# Patient Record
Sex: FEMALE | Race: WHITE | Employment: OTHER | URBAN - NONMETROPOLITAN AREA
[De-identification: names, ages, dates, MRNs, and addresses within clinical notes are randomized per-mention and may not be internally consistent; named-entity substitution may affect disease eponyms.]

---

## 2017-04-18 RX ORDER — VALSARTAN 160 MG/1
1 TABLET ORAL DAILY
COMMUNITY
Start: 2011-09-14 | End: 2017-04-18 | Stop reason: SDUPTHER

## 2017-04-18 RX ORDER — VALSARTAN 160 MG/1
160 TABLET ORAL DAILY
Qty: 90 TABLET | Refills: 1 | Status: SHIPPED | OUTPATIENT
Start: 2017-04-18 | End: 2017-10-16 | Stop reason: SDUPTHER

## 2017-04-26 ENCOUNTER — HOSPITAL ENCOUNTER (OUTPATIENT)
Dept: WOMENS IMAGING | Age: 82
Discharge: HOME | End: 2017-04-26
Payer: MEDICARE

## 2017-04-26 DIAGNOSIS — Z12.31 VISIT FOR SCREENING MAMMOGRAM: ICD-10-CM

## 2017-04-26 PROCEDURE — 77063 BREAST TOMOSYNTHESIS BI: CPT

## 2017-05-01 ENCOUNTER — LAB (OUTPATIENT)
Dept: FAMILY MEDICINE CLINIC | Facility: CLINIC | Age: 82
End: 2017-05-01

## 2017-05-01 DIAGNOSIS — Z00.00 ROUTINE GENERAL MEDICAL EXAMINATION AT A HEALTH CARE FACILITY: Primary | ICD-10-CM

## 2017-05-01 DIAGNOSIS — E55.9 UNSPECIFIED VITAMIN D DEFICIENCY: ICD-10-CM

## 2017-05-02 LAB
25(OH)D3+25(OH)D2 SERPL-MCNC: 52.3 NG/ML (ref 30–100)
ALBUMIN SERPL-MCNC: 3.8 G/DL (ref 3.5–5)
ALBUMIN/GLOB SERPL: 1.2 G/DL (ref 1.1–2.5)
ALP SERPL-CCNC: 100 U/L (ref 24–120)
ALT SERPL-CCNC: 30 U/L (ref 0–54)
AST SERPL-CCNC: 32 U/L (ref 7–45)
BASOPHILS # BLD AUTO: 0.12 10*3/MM3 (ref 0–0.2)
BASOPHILS NFR BLD AUTO: 1.3 % (ref 0–2)
BILIRUB SERPL-MCNC: 0.4 MG/DL (ref 0.1–1)
BUN SERPL-MCNC: 15 MG/DL (ref 5–21)
BUN/CREAT SERPL: 19.5 (ref 7–25)
CALCIUM SERPL-MCNC: 9.7 MG/DL (ref 8.4–10.4)
CHLORIDE SERPL-SCNC: 100 MMOL/L (ref 98–110)
CHOLEST SERPL-MCNC: 157 MG/DL (ref 130–200)
CO2 SERPL-SCNC: 28 MMOL/L (ref 24–31)
CREAT SERPL-MCNC: 0.77 MG/DL (ref 0.5–1.4)
EOSINOPHIL # BLD AUTO: 0.51 10*3/MM3 (ref 0–0.7)
EOSINOPHIL NFR BLD AUTO: 5.6 % (ref 0–4)
ERYTHROCYTE [DISTWIDTH] IN BLOOD BY AUTOMATED COUNT: 14.8 % (ref 12–15)
GLOBULIN SER CALC-MCNC: 3.2 GM/DL
GLUCOSE SERPL-MCNC: 86 MG/DL (ref 70–100)
HCT VFR BLD AUTO: 42.8 % (ref 37–47)
HDLC SERPL-MCNC: 63 MG/DL
HGB BLD-MCNC: 14 G/DL (ref 12–16)
IMM GRANULOCYTES # BLD: 0.02 10*3/MM3 (ref 0–0.03)
IMM GRANULOCYTES NFR BLD: 0.2 % (ref 0–5)
LDLC SERPL CALC-MCNC: 73 MG/DL (ref 0–99)
LYMPHOCYTES # BLD AUTO: 2.27 10*3/MM3 (ref 0.72–4.86)
LYMPHOCYTES NFR BLD AUTO: 24.8 % (ref 15–45)
MCH RBC QN AUTO: 30 PG (ref 28–32)
MCHC RBC AUTO-ENTMCNC: 32.7 G/DL (ref 33–36)
MCV RBC AUTO: 91.8 FL (ref 82–98)
MONOCYTES # BLD AUTO: 0.63 10*3/MM3 (ref 0.19–1.3)
MONOCYTES NFR BLD AUTO: 6.9 % (ref 4–12)
NEUTROPHILS # BLD AUTO: 5.6 10*3/MM3 (ref 1.87–8.4)
NEUTROPHILS NFR BLD AUTO: 61.2 % (ref 39–78)
PLATELET # BLD AUTO: 365 10*3/MM3 (ref 130–400)
POTASSIUM SERPL-SCNC: 4.5 MMOL/L (ref 3.5–5.3)
PROT SERPL-MCNC: 7 G/DL (ref 6.3–8.7)
RBC # BLD AUTO: 4.66 10*6/MM3 (ref 4.2–5.4)
SODIUM SERPL-SCNC: 139 MMOL/L (ref 135–145)
TRIGL SERPL-MCNC: 105 MG/DL (ref 0–149)
TSH SERPL DL<=0.005 MIU/L-ACNC: 0.89 MIU/ML (ref 0.47–4.68)
VLDLC SERPL CALC-MCNC: 21 MG/DL
WBC # BLD AUTO: 9.15 10*3/MM3 (ref 4.8–10.8)

## 2017-05-03 ENCOUNTER — OFFICE VISIT (OUTPATIENT)
Dept: SURGERY | Age: 82
End: 2017-05-03
Payer: MEDICARE

## 2017-05-03 VITALS
BODY MASS INDEX: 23.39 KG/M2 | DIASTOLIC BLOOD PRESSURE: 66 MMHG | HEIGHT: 63 IN | SYSTOLIC BLOOD PRESSURE: 130 MMHG | HEART RATE: 68 BPM | RESPIRATION RATE: 16 BRPM | WEIGHT: 132 LBS

## 2017-05-03 DIAGNOSIS — Z12.31 VISIT FOR SCREENING MAMMOGRAM: Primary | ICD-10-CM

## 2017-05-03 DIAGNOSIS — Z98.890 S/P LEFT BREAST BIOPSY: ICD-10-CM

## 2017-05-03 PROCEDURE — 99213 OFFICE O/P EST LOW 20 MIN: CPT | Performed by: SURGERY

## 2017-05-08 PROBLEM — E89.40 SURGICAL MENOPAUSE: Status: ACTIVE | Noted: 2017-05-08

## 2017-05-08 PROBLEM — M81.0 OSTEOPOROSIS: Status: ACTIVE | Noted: 2017-05-08

## 2017-05-08 PROBLEM — I50.9 CONGESTIVE HEART FAILURE: Status: ACTIVE | Noted: 2017-05-08

## 2017-05-08 PROBLEM — I10 HYPERTENSION: Chronic | Status: ACTIVE | Noted: 2017-05-08

## 2017-05-08 PROBLEM — N60.19 FIBROCYSTIC BREAST DISEASE: Chronic | Status: ACTIVE | Noted: 2017-05-08

## 2017-05-08 PROBLEM — D64.9 ANEMIA: Status: ACTIVE | Noted: 2017-05-08

## 2017-05-08 PROBLEM — Z00.00 WELLNESS EXAMINATION: Status: ACTIVE | Noted: 2017-05-08

## 2017-05-08 PROBLEM — S06.0XAA CONCUSSION: Status: ACTIVE | Noted: 2017-05-08

## 2017-05-09 ENCOUNTER — OFFICE VISIT (OUTPATIENT)
Dept: FAMILY MEDICINE CLINIC | Facility: CLINIC | Age: 82
End: 2017-05-09

## 2017-05-09 VITALS
OXYGEN SATURATION: 97 % | SYSTOLIC BLOOD PRESSURE: 124 MMHG | TEMPERATURE: 98.5 F | DIASTOLIC BLOOD PRESSURE: 70 MMHG | HEIGHT: 63 IN | WEIGHT: 138 LBS | HEART RATE: 82 BPM | RESPIRATION RATE: 16 BRPM | BODY MASS INDEX: 24.45 KG/M2

## 2017-05-09 DIAGNOSIS — I50.32 CHRONIC DIASTOLIC CONGESTIVE HEART FAILURE (HCC): ICD-10-CM

## 2017-05-09 DIAGNOSIS — M81.0 OSTEOPOROSIS: ICD-10-CM

## 2017-05-09 DIAGNOSIS — D64.9 ANEMIA, UNSPECIFIED TYPE: Primary | ICD-10-CM

## 2017-05-09 DIAGNOSIS — N60.19 FIBROCYSTIC BREAST DISEASE, UNSPECIFIED LATERALITY: Chronic | ICD-10-CM

## 2017-05-09 DIAGNOSIS — R51.9 HEADACHE, UNSPECIFIED HEADACHE TYPE: ICD-10-CM

## 2017-05-09 DIAGNOSIS — I10 ESSENTIAL HYPERTENSION: Chronic | ICD-10-CM

## 2017-05-09 DIAGNOSIS — R22.1 NODULE OF NECK: ICD-10-CM

## 2017-05-09 PROCEDURE — 99213 OFFICE O/P EST LOW 20 MIN: CPT | Performed by: FAMILY MEDICINE

## 2017-05-09 RX ORDER — MULTIVIT,IRON,MINERALS/LUTEIN
1 TABLET ORAL DAILY
COMMUNITY

## 2017-05-09 RX ORDER — ASPIRIN 81 MG/1
1 TABLET ORAL DAILY
COMMUNITY

## 2017-05-09 RX ORDER — CHLORAL HYDRATE 500 MG
1000 CAPSULE ORAL
COMMUNITY

## 2017-07-31 ENCOUNTER — OFFICE VISIT (OUTPATIENT)
Dept: NEUROLOGY | Age: 82
End: 2017-07-31
Payer: MEDICARE

## 2017-07-31 VITALS
HEIGHT: 63 IN | DIASTOLIC BLOOD PRESSURE: 85 MMHG | BODY MASS INDEX: 23.74 KG/M2 | SYSTOLIC BLOOD PRESSURE: 135 MMHG | RESPIRATION RATE: 14 BRPM | HEART RATE: 82 BPM | WEIGHT: 134 LBS

## 2017-07-31 DIAGNOSIS — M54.81 BILATERAL OCCIPITAL NEURALGIA: Primary | ICD-10-CM

## 2017-07-31 DIAGNOSIS — M54.2 CERVICALGIA: ICD-10-CM

## 2017-07-31 PROCEDURE — 99203 OFFICE O/P NEW LOW 30 MIN: CPT | Performed by: PSYCHIATRY & NEUROLOGY

## 2017-07-31 RX ORDER — RALOXIFENE HYDROCHLORIDE 60 MG/1
TABLET, FILM COATED ORAL
COMMUNITY
Start: 2016-10-19 | End: 2017-07-31

## 2017-07-31 RX ORDER — VALSARTAN 160 MG/1
160 TABLET ORAL
COMMUNITY
Start: 2017-04-18 | End: 2017-07-31

## 2017-07-31 RX ORDER — ASPIRIN 81 MG/1
1 TABLET ORAL
COMMUNITY
End: 2017-07-31

## 2017-07-31 RX ORDER — CYCLOBENZAPRINE HCL 10 MG
5 TABLET ORAL 2 TIMES DAILY PRN
Qty: 60 TABLET | Refills: 1 | Status: SHIPPED | OUTPATIENT
Start: 2017-07-31 | End: 2017-08-10

## 2017-07-31 RX ORDER — CHLORAL HYDRATE 500 MG
1000 CAPSULE ORAL
COMMUNITY
End: 2017-07-31

## 2017-07-31 RX ORDER — CALCIUM CARBONATE/VITAMIN D3 600 MG-10
1 TABLET ORAL
COMMUNITY
End: 2017-07-31

## 2017-07-31 RX ORDER — MULTIVIT,IRON,MINERALS/LUTEIN
1 TABLET ORAL
COMMUNITY

## 2017-08-01 ENCOUNTER — TELEPHONE (OUTPATIENT)
Dept: NEUROLOGY | Age: 82
End: 2017-08-01

## 2017-08-01 RX ORDER — TIZANIDINE HYDROCHLORIDE 4 MG/1
4 CAPSULE, GELATIN COATED ORAL 3 TIMES DAILY PRN
Qty: 60 CAPSULE | Refills: 2 | Status: SHIPPED | OUTPATIENT
Start: 2017-08-01 | End: 2017-08-31

## 2017-08-01 RX ORDER — TIZANIDINE 4 MG/1
4 TABLET ORAL EVERY 8 HOURS PRN
Qty: 60 TABLET | Refills: 2 | Status: SHIPPED | OUTPATIENT
Start: 2017-08-01 | End: 2017-08-01

## 2017-08-01 ASSESSMENT — ENCOUNTER SYMPTOMS
HEMOPTYSIS: 0
BLURRED VISION: 0
NAUSEA: 0
SHORTNESS OF BREATH: 0
ABDOMINAL PAIN: 0
BLOOD IN STOOL: 0
CONSTIPATION: 0
SPUTUM PRODUCTION: 0
DOUBLE VISION: 0
VOMITING: 0
DIARRHEA: 0
BACK PAIN: 0

## 2017-08-31 ENCOUNTER — PROCEDURE VISIT (OUTPATIENT)
Dept: NEUROLOGY | Age: 82
End: 2017-08-31
Payer: MEDICARE

## 2017-08-31 VITALS
DIASTOLIC BLOOD PRESSURE: 76 MMHG | SYSTOLIC BLOOD PRESSURE: 126 MMHG | BODY MASS INDEX: 23.57 KG/M2 | HEIGHT: 63 IN | WEIGHT: 133 LBS

## 2017-08-31 DIAGNOSIS — M54.81 BILATERAL OCCIPITAL NEURALGIA: Primary | ICD-10-CM

## 2017-08-31 DIAGNOSIS — M54.2 CERVICALGIA: ICD-10-CM

## 2017-08-31 DIAGNOSIS — R51.9 HEADACHE DISORDER: ICD-10-CM

## 2017-08-31 PROCEDURE — 64405 NJX AA&/STRD GR OCPL NRV: CPT | Performed by: PSYCHIATRY & NEUROLOGY

## 2017-10-04 ENCOUNTER — PROCEDURE VISIT (OUTPATIENT)
Dept: NEUROLOGY | Age: 82
End: 2017-10-04
Payer: MEDICARE

## 2017-10-04 VITALS
BODY MASS INDEX: 23.91 KG/M2 | WEIGHT: 135 LBS | HEART RATE: 86 BPM | SYSTOLIC BLOOD PRESSURE: 131 MMHG | OXYGEN SATURATION: 94 % | DIASTOLIC BLOOD PRESSURE: 82 MMHG

## 2017-10-04 DIAGNOSIS — M54.81 BILATERAL OCCIPITAL NEURALGIA: ICD-10-CM

## 2017-10-04 DIAGNOSIS — R51.9 HEADACHE DISORDER: ICD-10-CM

## 2017-10-04 DIAGNOSIS — M54.2 CERVICALGIA: Primary | ICD-10-CM

## 2017-10-04 PROCEDURE — 99213 OFFICE O/P EST LOW 20 MIN: CPT | Performed by: PSYCHIATRY & NEUROLOGY

## 2017-10-04 RX ORDER — OXCARBAZEPINE 150 MG/1
TABLET, FILM COATED ORAL
Qty: 120 TABLET | Refills: 5 | Status: SHIPPED | OUTPATIENT
Start: 2017-10-04

## 2017-10-04 NOTE — PROGRESS NOTES
pain  Respiratory - no significant shortness of breath or cough  Cardiovascular - no chest pain No palpitations or significant leg swelling  Gastrointestinal - no abdominal swelling or pain. Genitourinary - No difficulty urinating, dysuria  Musculoskeletal - no back pain or myalgia. Skin - no color change or rash  Neurologic - No seizures. No lateralizing weakness. Hematologic - yes easy bruising or excessive bleeding. Psychiatric - no severe anxiety or nervousness. All other review of systems are negative. Current Outpatient Prescriptions   Medication Sig Dispense Refill    OXcarbazepine (TRILEPTAL) 150 MG tablet 2 tabs bid 120 tablet 5    Multiple Vitamins-Minerals (CENTRUM SILVER ULTRA WOMENS) TABS Take 1 tablet by mouth      DIOVAN 160 MG tablet       aspirin 81 MG EC tablet Take 81 mg by mouth daily.  fish oil-omega-3 fatty acids 1000 MG capsule Take 2 g by mouth daily.  Calcium Citrate-Vitamin D 200-200 MG-UNIT TABS Take  by mouth daily. No current facility-administered medications for this visit. /82  Pulse 86  Wt 135 lb (61.2 kg)  SpO2 94%  BMI 23.91 kg/m2    Constitutional - well developed, well nourished. Eyes - conjunctiva normal.  Pupils react to light  Ear, nose, throat -hearing intact to finger rub No scars, masses, or lesions over external nose or ears, no atrophy of tongue  Neck-symmetric, no masses noted, no jugular vein distension  Respiration- chest wall appears symmetric, good expansion,   normal effort without use of accessory muscles  Musculoskeletal - no significant wasting of muscles noted, no bony deformities  Extremities-no clubbing, cyanosis or edema  Skin - warm, dry, and intact. No rash, erythema, or pallor. Psychiatric - mood, affect, and behavior appear normal.      Constitutional - well developed, well nourished.     Eyes - conjunctiva normal.  Ear, nose, throat - No scars, masses, or lesions over external nose or ears, no atrophy of tongue  Neck-symmetric, no masses noted, no jugular vein distension  Respiration- chest wall appears symmetric, good expansion,   normal effort without use of accessory muscles  Musculoskeletal - no significant wasting of muscles noted, no bony deformities  Extremities-no clubbing, cyanosis or edema  Skin - warm, dry, and intact. No rash, erythema, or pallor. Psychiatric - mood, affect, and behavior appear normal.      Neurological exam  Awake, alert, fluent oriented  appropriate affect  Attention and concentration appear appropriate  Recent and remote memory appears unremarkable  Speech normal without dysarthria  No clear issues with language of fund of knowledge    Cranial Nerve Exam     CN III, IV,VI-EOMI, No nystagmus, conjugate eye movements, no ptosis  CN VII-no facial assymetry        Motor Exam  antigravity throughout upper and lower extremities bilaterally    Tremors- no tremors in hands or head noted    Gait  Normal base and speed  No ataxia    No results found for: [id number]  No results found for: WBC, HGB, HCT, MCV, PLT  No results found for: NA, K, CL, CO2, BUN, CREATININE, GLUCOSE, CALCIUM, PROT, LABALBU, BILITOT, ALKPHOS, AST, ALT, LABGLOM, GFRAA, AGRATIO, GLOB        Assessment    ICD-10-CM ICD-9-CM    1. Cervicalgia M54.2 723.1    2. Headache disorder R51 784.0    3. Bilateral occipital neuralgia M54.81 723.8        Previously she was give 5 cc of 0.25% bupivacaine into her bilateral cervical and occipital regions mostly on right. She will be tried on trileptal. She is to follow up on prn and call with any questions. Plan    No orders of the defined types were placed in this encounter. Orders Placed This Encounter   Medications    OXcarbazepine (TRILEPTAL) 150 MG tablet     Si tabs bid     Dispense:  120 tablet     Refill:  5       Return if symptoms worsen or fail to improve.

## 2017-10-04 NOTE — MR AVS SNAPSHOT
After Visit Summary             Herman Andrade   10/4/2017 1:30 PM   Procedure visit    Description:  Female : 1935   Provider:  Christine Chandra MD   Department:  [facility]              Your Follow-Up and Future Appointments         Below is a list of your follow-up and future appointments. This may not be a complete list as you may have made appointments directly with providers that we are not aware of or your providers may have made some for you. Please call your providers to confirm appointments. It is important to keep your appointments. Please bring your current insurance card, photo ID, co-pay, and all medication bottles to your appointment. If self-pay, payment is expected at the time of service. Your To-Do List     Follow-Up    Return if symptoms worsen or fail to improve. Information from Your Visit        Department     Name Address Phone Fax    [address] [place] [address]  [facility]  [address] [place] [address] [phone]      You Were Seen for:         Comments    Cervicalgia   [116. 1. ICD-9-CM]         Vital Signs     Blood Pressure Pulse Weight Oxygen Saturation Body Mass Index Smoking Status    131/82 86 135 lb (61.2 kg) 94% 23.91 kg/m2 Former Smoker         Today's Medication Changes          These changes are accurate as of: 10/4/17  1:32 PM.  If you have any questions, ask your nurse or doctor.                START taking these medications           OXcarbazepine 150 MG tablet   Commonly known as:  TRILEPTAL   Instructions:  2 tabs bid   Quantity:  120 tablet   Refills:  5   Started by:  Christine Chandra MD            Where to Get Your Medications      These medications were sent to [facility], [address]  [address]  [address] [facility], [address] [place] [address]     Phone:  [phone]     OXcarbazepine 150 MG tablet               Your Current Medications Are OXcarbazepine (TRILEPTAL) 150 MG tablet 2 tabs bid    Multiple Vitamins-Minerals (CENTRUM SILVER ULTRA WOMENS) TABS Take 1 tablet by mouth    DIOVAN 160 MG tablet     aspirin 81 MG EC tablet Take 81 mg by mouth daily. fish oil-omega-3 fatty acids 1000 MG capsule Take 2 g by mouth daily. Calcium Citrate-Vitamin D 200-200 MG-UNIT TABS Take  by mouth daily. Allergies           No Known Allergies         Additional Information        Basic Information     Date Of Birth Sex Race Ethnicity Preferred Language    1935 Female White Non-/Non  English      Problem List as of 10/4/2017  Date Reviewed: 7/31/2017                Headache disorder    Bilateral occipital neuralgia    Cervicalgia    Visit for screening mammogram    S/P left breast biopsy, 2008      Preventive Care        Date Due    Tetanus Combination Vaccine (1 - Tdap) 11/19/1956    Cholesterol Screening 11/19/1977    Zoster Vaccine 11/19/1997    Osteoporosis screening or a bone density scan (Dexa) is recommended once at age 72. Based upon the results and risk factors for bone loss, your provider will recommend whether this needs to be repeated. 11/19/2002    Pneumococcal Vaccines (two) for all adults aged 72 and over (1 of 2 - PCV13) 11/19/2002    Yearly Flu Vaccine (1) 9/1/2017            [employer] Signup           Our records indicate that you have an active [employer] account. You can view your After Visit Summary by going to [email or web address] and logging in with your [employer] username and password. If you don't have a [employer] username and password but a parent or guardian has access to your record, the parent or guardian should login with their own [employer] username and password and access your record to view the After Visit Summary.      Additional Information  If you have questions, please contact the physician practice where you receive care. Remember, MyChart is NOT to be used for urgent needs. For medical emergencies, dial 911. For questions regarding your [employer] account call [phone]. If you have a clinical question, please call your doctor's office.

## 2017-10-04 NOTE — PROGRESS NOTES
Review of Systems    Constitutional - No fever or chills. No diaphoresis or significant fatigue. HENT -  yes tinnitus or significant hearing loss. Eyes - no sudden vision change or eye pain  Respiratory - no significant shortness of breath or cough  Cardiovascular - no chest pain No palpitations or significant leg swelling  Gastrointestinal - no abdominal swelling or pain. Genitourinary - No difficulty urinating, dysuria  Musculoskeletal - no back pain or myalgia. Skin - no color change or rash  Neurologic - No seizures. No lateralizing weakness. Hematologic - yes easy bruising or excessive bleeding. Psychiatric - no severe anxiety or nervousness. All other review of systems are negative.

## 2017-10-17 RX ORDER — VALSARTAN 160 MG/1
TABLET ORAL
Qty: 90 TABLET | Refills: 2 | Status: SHIPPED | OUTPATIENT
Start: 2017-10-17 | End: 2018-07-19 | Stop reason: RX

## 2017-10-31 ENCOUNTER — FLU SHOT (OUTPATIENT)
Dept: FAMILY MEDICINE CLINIC | Facility: CLINIC | Age: 82
End: 2017-10-31

## 2017-10-31 DIAGNOSIS — Z23 NEED FOR INFLUENZA VACCINE: ICD-10-CM

## 2017-10-31 PROCEDURE — G0008 ADMIN INFLUENZA VIRUS VAC: HCPCS | Performed by: FAMILY MEDICINE

## 2017-10-31 PROCEDURE — 90686 IIV4 VACC NO PRSV 0.5 ML IM: CPT | Performed by: FAMILY MEDICINE

## 2017-11-14 ENCOUNTER — TELEPHONE (OUTPATIENT)
Dept: FAMILY MEDICINE CLINIC | Facility: CLINIC | Age: 82
End: 2017-11-14

## 2017-11-14 RX ORDER — AZITHROMYCIN 250 MG/1
TABLET, FILM COATED ORAL
Qty: 6 TABLET | Refills: 0 | Status: SHIPPED | OUTPATIENT
Start: 2017-11-14 | End: 2018-05-10

## 2017-11-14 NOTE — TELEPHONE ENCOUNTER
"\"My chest hurts, head hurts, and want to get something called in\"  PHONE CALL-NURSE       Hillary Overton  1935      1. Your problem is, my chest, and head hurts, coughing a lot at night, nasal head congestion    2. Onset first sxs, last week, just gotten worse yesterday    3. Fever I dont know I just sweated in the night    4. If yes, taken or felt feverish    5. How high has it gone    6. Coming down now    7. Wants to be seen no    8. Prefers per phone, yes    9. Would come in if provider requests, I would come in but I have so much to do    10. Rx/Allergy list correct, yes    11. Cough, productive yellow    12. SOB, \"if I stir around a lot\"    13. Wheezing no    14. Nausea/Vomiting, no    15. Diarrhea, no    16. If female, any chance of pregnancy    17. If no chance of pregnancy/why    18. Breast feeding    19. Anything else they want us to know, I think I get this every year    20. Pharmacy , [facility]  "

## 2018-04-27 ENCOUNTER — OFFICE VISIT (OUTPATIENT)
Dept: SURGERY | Age: 83
End: 2018-04-27
Payer: MEDICARE

## 2018-04-27 ENCOUNTER — HOSPITAL ENCOUNTER (OUTPATIENT)
Dept: WOMENS IMAGING | Age: 83
Discharge: HOME | End: 2018-04-27
Payer: MEDICARE

## 2018-04-27 VITALS — HEART RATE: 76 BPM | SYSTOLIC BLOOD PRESSURE: 122 MMHG | DIASTOLIC BLOOD PRESSURE: 80 MMHG

## 2018-04-27 DIAGNOSIS — Z12.39 SCREENING BREAST EXAMINATION: Primary | ICD-10-CM

## 2018-04-27 DIAGNOSIS — Z12.31 VISIT FOR SCREENING MAMMOGRAM: ICD-10-CM

## 2018-04-27 PROCEDURE — 77063 BREAST TOMOSYNTHESIS BI: CPT

## 2018-04-27 PROCEDURE — G8427 DOCREV CUR MEDS BY ELIG CLIN: HCPCS | Performed by: PHYSICIAN ASSISTANT

## 2018-04-27 PROCEDURE — G8420 CALC BMI NORM PARAMETERS: HCPCS | Performed by: PHYSICIAN ASSISTANT

## 2018-04-27 PROCEDURE — 99212 OFFICE O/P EST SF 10 MIN: CPT | Performed by: PHYSICIAN ASSISTANT

## 2018-04-27 PROCEDURE — 1090F PRES/ABSN URINE INCON ASSESS: CPT | Performed by: PHYSICIAN ASSISTANT

## 2018-04-27 PROCEDURE — 4040F PNEUMOC VAC/ADMIN/RCVD: CPT | Performed by: PHYSICIAN ASSISTANT

## 2018-04-27 PROCEDURE — 1123F ACP DISCUSS/DSCN MKR DOCD: CPT | Performed by: PHYSICIAN ASSISTANT

## 2018-04-27 PROCEDURE — 1036F TOBACCO NON-USER: CPT | Performed by: PHYSICIAN ASSISTANT

## 2018-04-27 PROCEDURE — G8400 PT W/DXA NO RESULTS DOC: HCPCS | Performed by: PHYSICIAN ASSISTANT

## 2018-05-07 DIAGNOSIS — M81.0 OSTEOPOROSIS, UNSPECIFIED OSTEOPOROSIS TYPE, UNSPECIFIED PATHOLOGICAL FRACTURE PRESENCE: ICD-10-CM

## 2018-05-07 DIAGNOSIS — Z00.00 WELLNESS EXAMINATION: ICD-10-CM

## 2018-05-07 DIAGNOSIS — D64.9 ANEMIA, UNSPECIFIED TYPE: ICD-10-CM

## 2018-05-07 DIAGNOSIS — I10 ESSENTIAL HYPERTENSION: Primary | Chronic | ICD-10-CM

## 2018-05-07 DIAGNOSIS — I50.32 CHRONIC DIASTOLIC CONGESTIVE HEART FAILURE (HCC): ICD-10-CM

## 2018-05-08 ENCOUNTER — RESULTS ENCOUNTER (OUTPATIENT)
Dept: FAMILY MEDICINE CLINIC | Facility: CLINIC | Age: 83
End: 2018-05-08

## 2018-05-08 DIAGNOSIS — I50.32 CHRONIC DIASTOLIC CONGESTIVE HEART FAILURE (HCC): ICD-10-CM

## 2018-05-08 DIAGNOSIS — I10 ESSENTIAL HYPERTENSION: Chronic | ICD-10-CM

## 2018-05-08 DIAGNOSIS — M81.0 OSTEOPOROSIS, UNSPECIFIED OSTEOPOROSIS TYPE, UNSPECIFIED PATHOLOGICAL FRACTURE PRESENCE: ICD-10-CM

## 2018-05-08 DIAGNOSIS — D64.9 ANEMIA, UNSPECIFIED TYPE: ICD-10-CM

## 2018-05-08 DIAGNOSIS — Z00.00 WELLNESS EXAMINATION: ICD-10-CM

## 2018-05-08 LAB
25(OH)D3+25(OH)D2 SERPL-MCNC: 55.7 NG/ML (ref 30–100)
ALBUMIN SERPL-MCNC: 4 G/DL (ref 3.5–5)
ALBUMIN/GLOB SERPL: 1.4 G/DL (ref 1.1–2.5)
ALP SERPL-CCNC: 112 U/L (ref 24–120)
ALT SERPL-CCNC: 28 U/L (ref 0–54)
AST SERPL-CCNC: 33 U/L (ref 7–45)
BASOPHILS # BLD AUTO: 0.08 10*3/MM3 (ref 0–0.2)
BASOPHILS NFR BLD AUTO: 1.2 % (ref 0–2)
BILIRUB SERPL-MCNC: 0.4 MG/DL (ref 0.1–1)
BUN SERPL-MCNC: 18 MG/DL (ref 5–21)
BUN/CREAT SERPL: 23.7 (ref 7–25)
CALCIUM SERPL-MCNC: 9.7 MG/DL (ref 8.4–10.4)
CHLORIDE SERPL-SCNC: 102 MMOL/L (ref 98–110)
CHOLEST SERPL-MCNC: 143 MG/DL (ref 130–200)
CO2 SERPL-SCNC: 29 MMOL/L (ref 24–31)
CREAT SERPL-MCNC: 0.76 MG/DL (ref 0.5–1.4)
EOSINOPHIL # BLD AUTO: 0.51 10*3/MM3 (ref 0–0.7)
EOSINOPHIL NFR BLD AUTO: 7.6 % (ref 0–4)
ERYTHROCYTE [DISTWIDTH] IN BLOOD BY AUTOMATED COUNT: 14.1 % (ref 12–15)
GFR SERPLBLD CREATININE-BSD FMLA CKD-EPI: 73 ML/MIN/1.73
GFR SERPLBLD CREATININE-BSD FMLA CKD-EPI: 89 ML/MIN/1.73
GLOBULIN SER CALC-MCNC: 2.8 GM/DL
GLUCOSE SERPL-MCNC: 89 MG/DL (ref 70–100)
HCT VFR BLD AUTO: 43.4 % (ref 37–47)
HDLC SERPL-MCNC: 53 MG/DL
HGB BLD-MCNC: 14.1 G/DL (ref 12–16)
IMM GRANULOCYTES # BLD: 0.02 10*3/MM3 (ref 0–0.03)
IMM GRANULOCYTES NFR BLD: 0.3 % (ref 0–5)
LDLC SERPL CALC-MCNC: 68 MG/DL (ref 0–99)
LYMPHOCYTES # BLD AUTO: 2.08 10*3/MM3 (ref 0.72–4.86)
LYMPHOCYTES NFR BLD AUTO: 31 % (ref 15–45)
MCH RBC QN AUTO: 30.2 PG (ref 28–32)
MCHC RBC AUTO-ENTMCNC: 32.5 G/DL (ref 33–36)
MCV RBC AUTO: 92.9 FL (ref 82–98)
MONOCYTES # BLD AUTO: 0.58 10*3/MM3 (ref 0.19–1.3)
MONOCYTES NFR BLD AUTO: 8.7 % (ref 4–12)
NEUTROPHILS # BLD AUTO: 3.43 10*3/MM3 (ref 1.87–8.4)
NEUTROPHILS NFR BLD AUTO: 51.2 % (ref 39–78)
NRBC BLD AUTO-RTO: 0 /100 WBC (ref 0–0)
PLATELET # BLD AUTO: 347 10*3/MM3 (ref 130–400)
POTASSIUM SERPL-SCNC: 4.4 MMOL/L (ref 3.5–5.3)
PROT SERPL-MCNC: 6.8 G/DL (ref 6.3–8.7)
RBC # BLD AUTO: 4.67 10*6/MM3 (ref 4.2–5.4)
SODIUM SERPL-SCNC: 141 MMOL/L (ref 135–145)
TRIGL SERPL-MCNC: 109 MG/DL (ref 0–149)
TSH SERPL DL<=0.005 MIU/L-ACNC: 1.49 MIU/ML (ref 0.47–4.68)
VLDLC SERPL CALC-MCNC: 21.8 MG/DL
WBC # BLD AUTO: 6.7 10*3/MM3 (ref 4.8–10.8)

## 2018-05-10 ENCOUNTER — OFFICE VISIT (OUTPATIENT)
Dept: FAMILY MEDICINE CLINIC | Facility: CLINIC | Age: 83
End: 2018-05-10

## 2018-05-10 VITALS
OXYGEN SATURATION: 97 % | DIASTOLIC BLOOD PRESSURE: 68 MMHG | HEIGHT: 63 IN | HEART RATE: 84 BPM | RESPIRATION RATE: 18 BRPM | BODY MASS INDEX: 22.32 KG/M2 | WEIGHT: 126 LBS | TEMPERATURE: 97.8 F | SYSTOLIC BLOOD PRESSURE: 138 MMHG

## 2018-05-10 DIAGNOSIS — M81.0 OSTEOPOROSIS, UNSPECIFIED OSTEOPOROSIS TYPE, UNSPECIFIED PATHOLOGICAL FRACTURE PRESENCE: ICD-10-CM

## 2018-05-10 DIAGNOSIS — R51.9 NONINTRACTABLE HEADACHE, UNSPECIFIED CHRONICITY PATTERN, UNSPECIFIED HEADACHE TYPE: ICD-10-CM

## 2018-05-10 DIAGNOSIS — D64.9 ANEMIA, UNSPECIFIED TYPE: Primary | ICD-10-CM

## 2018-05-10 DIAGNOSIS — I50.32 CHRONIC DIASTOLIC CONGESTIVE HEART FAILURE (HCC): ICD-10-CM

## 2018-05-10 DIAGNOSIS — Z79.01 ANTICOAGULATED: ICD-10-CM

## 2018-05-10 PROBLEM — Z01.89 LABORATORY TEST: Status: ACTIVE | Noted: 2018-05-10

## 2018-05-10 PROCEDURE — 99213 OFFICE O/P EST LOW 20 MIN: CPT | Performed by: FAMILY MEDICINE

## 2018-05-10 NOTE — PROGRESS NOTES
"Subjective   Hillary Overton is a 82 y.o. female presenting with chief complaint of:   Chief Complaint   Patient presents with   • Hypertension   • Foot Swelling     \"my left foot, it is alot better, I dont want anything done\" swelling slight, limp       History of Present Illness :  Alone.   Has multiple chronic problems to consider that might have a bearing on today's issues;  an interval appointment.       Other chronic problem/s to consider:   Anxiety: This has been present for years/over a year.  It is chronic.  It is stable/doing well.  It is associated with stressors: more past.  Medications/Rx change not requested.  Congestive heart failure/echo changes: This has been present for years/over a year.  It is chronic.  The CHF had features on echo of diastolic dysfunction.  This has not been associated with SOB, LE edema on/off.  Osteoporosis/Osteopenia:  This has been present for years/over a year.  It is chronic.  Agrees to another bone density when due this year. Using Evista.   HTN: Chronic/for years.  No problems with medications.  Good control home checks; implies essential HTN.   Headaches:  Chronic for a long time; with seeing neurology and current Rx; better.     Has an/another acute issue today: L foot forefoot pain few days; resolved now.  No known injury. No swelling. .    The following portions of the patient's history were reviewed and updated as appropriate: allergies, current medications, past family history, past medical history, past social history, past surgical history and problem list.      Current Outpatient Prescriptions:   •  aspirin 81 MG EC tablet, Take 1 tablet by mouth Daily., Disp: , Rfl:   •  B Complex Vitamins (B COMPLEX PO), Take 1 tablet by mouth Daily., Disp: , Rfl:   •  BIOTIN 5000 PO, Take 1 capsule by mouth Daily., Disp: , Rfl:   •  Calcium Carb-Cholecalciferol (CALCIUM-VITAMIN D) 600-400 MG-UNIT tablet, Take 1 tablet by mouth Daily., Disp: , Rfl:   •  Multiple " Vitamins-Minerals (CENTRUM SILVER ULTRA WOMENS) tablet, Take 1 tablet by mouth Daily., Disp: , Rfl:   •  Omega-3 Fatty Acids (FISH OIL) 1000 MG capsule capsule, Take 1,000 mg by mouth Daily With Breakfast., Disp: , Rfl:   •  valsartan (DIOVAN) 160 MG tablet, TAKE 1 TABLET BY MOUTH DAILY, Disp: 90 tablet, Rfl: 2  •  raloxifene (EVISTA) 60 MG tablet, TAKE 1 TABLET BY MOUTH EVERY DAY, Disp: 30 tablet, Rfl: 5    No problems with medications.  Refills if needed done    No Known Allergies    Review of Systems  GENERAL:  Active/slower with limits, speed, stamina for age. Sleep is ok. No fever now.  SKIN: No rash/skin lesion of concern:   ENDO:  No syncope, near or diaphoretic sweaty spells.  HEENT: No recent head injury; or headache,  No vision change.  No significant hearing loss.  Ears without pain/drainage.  No sore throat.  No significant nasal/sinus congestion/drainage. No epistaxis.  CHEST: No chest wall tenderness or mass. No cough,  without wheeze.  No SOB; no hemoptysis.  CV: No chest pain, palpitations, ankle edema.  GI: No heartburn, dysphagia.  No abdominal pain, diarrhea, constipation.  No rectal bleeding, or melena.    Colonoscopy+neg/[facility]//11-17-10/10y    :  Voids without dysuria, or  incontinence to completion.  ORTHO: No painful/swollen joints but various on /off sore.  No sore neck or back.  No acute neck or back pain without recent injury.  NEURO: No dizziness, weakness of extremities.  No numbness/paresthesias.   PSYCH: No memory loss.  Mood good; not anxious, depressed but/and not suicidal.  Tries to tolerate stress .     Labs reviewed with her.   Results for orders placed or performed in visit on 05/08/18   Comprehensive metabolic panel   Result Value Ref Range    Glucose 89 70 - 100 mg/dL    BUN 18 5 - 21 mg/dL    Creatinine 0.76 0.50 - 1.40 mg/dL    eGFR Non African Am 73 >60 mL/min/1.73    eGFR African Am 89 >60 mL/min/1.73    BUN/Creatinine Ratio 23.7 7.0 - 25.0    Sodium 141 135 - 145 mmol/L     Potassium 4.4 3.5 - 5.3 mmol/L    Chloride 102 98 - 110 mmol/L    Total CO2 29.0 24.0 - 31.0 mmol/L    Calcium 9.7 8.4 - 10.4 mg/dL    Total Protein 6.8 6.3 - 8.7 g/dL    Albumin 4.00 3.50 - 5.00 g/dL    Globulin 2.8 gm/dL    A/G Ratio 1.4 1.1 - 2.5 g/dL    Total Bilirubin 0.4 0.1 - 1.0 mg/dL    Alkaline Phosphatase 112 24 - 120 U/L    AST (SGOT) 33 7 - 45 U/L    ALT (SGPT) 28 0 - 54 U/L   Lipid panel   Result Value Ref Range    Total Cholesterol 143 130 - 200 mg/dL    Triglycerides 109 0 - 149 mg/dL    HDL Cholesterol 53 >=50 mg/dL    VLDL Cholesterol 21.8 mg/dL    LDL Cholesterol  68 0 - 99 mg/dL   TSH   Result Value Ref Range    TSH 1.490 0.470 - 4.680 mIU/mL   Vitamin D 25 Hydroxy   Result Value Ref Range    25 Hydroxy, Vitamin D 55.7 30.0 - 100.0 ng/ml   CBC and Differential   Result Value Ref Range    WBC 6.70 4.80 - 10.80 10*3/mm3    RBC 4.67 4.20 - 5.40 10*6/mm3    Hemoglobin 14.1 12.0 - 16.0 g/dL    Hematocrit 43.4 37.0 - 47.0 %    MCV 92.9 82.0 - 98.0 fL    MCH 30.2 28.0 - 32.0 pg    MCHC 32.5 (L) 33.0 - 36.0 g/dL    RDW 14.1 12.0 - 15.0 %    Platelets 347 130 - 400 10*3/mm3    Neutrophil Rel % 51.2 39.0 - 78.0 %    Lymphocyte Rel % 31.0 15.0 - 45.0 %    Monocyte Rel % 8.7 4.0 - 12.0 %    Eosinophil Rel % 7.6 (H) 0.0 - 4.0 %    Basophil Rel % 1.2 0.0 - 2.0 %    Neutrophils Absolute 3.43 1.87 - 8.40 10*3/mm3    Lymphocytes Absolute 2.08 0.72 - 4.86 10*3/mm3    Monocytes Absolute 0.58 0.19 - 1.30 10*3/mm3    Eosinophils Absolute 0.51 0.00 - 0.70 10*3/mm3    Basophils Absolute 0.08 0.00 - 0.20 10*3/mm3    Immature Granulocyte Rel % 0.3 0.0 - 5.0 %    Immature Grans Absolute 0.02 0.00 - 0.03 10*3/mm3    nRBC 0.0 0.0 - 0.0 /100 WBC       No results found for: PSA     Lab Results:  CBC:    Lab Results - Last 18 Months  Lab Units 05/08/18  0706 05/01/17  1051   WBC 10*3/mm3  --  9.15   HEMOGLOBIN g/dL 14.1 14.0   HEMATOCRIT % 43.4 42.8   PLATELETS 10*3/mm3 347 365      BMP/CMP:    Lab Results - Last 18  "Months  Lab Units 05/08/18  0706 05/01/17  1051   SODIUM mmol/L 141 139   POTASSIUM mmol/L 4.4 4.5   CHLORIDE mmol/L 102 100   TOTAL CO2, ARTERIAL mmol/L 29.0 28.0   GLUCOSE mg/dL 89 86   BUN mg/dL 18 15   CREATININE mg/dL 0.76 0.77   EGFR IF NONAFRICN AM mL/min/1.73 73 72   EGFR IF AFRICN AM mL/min/1.73 89 88   CALCIUM mg/dL 9.7 9.7     HEPATIC:    Lab Results - Last 18 Months  Lab Units 05/08/18  0706 05/01/17  1051   ALT (SGPT) U/L 28 30   AST (SGOT) U/L 33 32   ALK PHOS U/L 112 100     THYROID:    Lab Results - Last 18 Months  Lab Units 05/08/18  0706 05/01/17  1051   TSH mIU/mL 1.490 0.888     A1C:No results for input(s): HGBA1C in the last 09999 hours.  PSA:No results for input(s): PSA in the last 75076 hours.    Objective   /68 (BP Location: Right arm, Patient Position: Sitting, Cuff Size: Adult)   Pulse 84   Temp 97.8 °F (36.6 °C) (Oral)   Resp 18   Ht 159.4 cm (62.75\")   Wt 57.2 kg (126 lb)   SpO2 97%   BMI 22.50 kg/m²   Body mass index is 22.5 kg/m².    Physical Exam  GENERAL:  Well nourished/developed in no acute distress.   SKIN: Turgor excellent, without wound, rash, lesion  HEENT: Normal cephalic without trauma.  Pupils equal round reactive to light. Extraocular motions full without nystagmus.   External canals nonobstructive nontender without reddness. Tymphatic membranes cleve with edmund structures intact.   Oral cavity without growths, exudates, and moist.  Posterior pharynx without mass, obstruction, redness.  No thyromegaly, mass, tenderness, lymphadenopathy and supple.  CV: Regular rhythm.  No murmur, gallop,  edema. Posterior pulses intact.  No carotid bruits.  CHEST: No chest wall tenderness or mass.   LUNGS: Symmetric motion with clear to auscultation.    ABD: Soft, nontender without mass.   ORTHO: Symmetric extremities without swelling/point tenderness.  Full gross range of motion.  NEURO: CN 2-12 grossly intact.  Symmetric facies and UE/LE. 3/5 strength throughout. 1/4 x bicep  " equal reflexes.  Nonfocal use extremities. Speech clear. Intact light touch with monofilament, vibratory sensation with tuning fork; equal toes/distal feet.    PSYCH: Oriented x 3.  Pleasant calm, well kept.  Purposeful/directed conservation with intact short/long gross memory.     Assessment/Plan     1. Anemia, unspecified type    2. Chronic diastolic congestive heart failure    3. Nonintractable headache, unspecified chronicity pattern, unspecified headache type    4. Osteoporosis, unspecified osteoporosis type, unspecified pathological fracture presence    5. Anticoagulated prevention/ASA 81        Rx: reviewed/changes:  same    LAB/Testing/Referrals: reviewed/orders:   Today:   Orders Placed This Encounter   Procedures   • DEXA Bone Density Axial     Usual:   12m CBC, CMP, LIPID, TSH, Vit D, iron, B12, folate    Discussions:   Body mass index is 22.5 kg/m².   Patient's Body mass index is 22.5 kg/m². BMI is within normal parameters. No follow-up required.  Non-smoker    There are no Patient Instructions on file for this visit.    Follow up: Return for lab during/or just before next apt;, Dr Wright-, 12m;.  Future Appointments  Date Time Provider Department Center   5/13/2019 8:30 AM LAB LENNY BARBA [facility] None   5/14/2019 2:00 PM MD JAMESON Mendez [facility] None

## 2018-05-30 ENCOUNTER — HOSPITAL ENCOUNTER (OUTPATIENT)
Dept: WOMENS IMAGING | Age: 83
Discharge: HOME | End: 2018-05-30
Payer: MEDICARE

## 2018-05-30 DIAGNOSIS — M81.0 OSTEOPOROSIS, SENILE: ICD-10-CM

## 2018-05-30 PROCEDURE — 77080 DXA BONE DENSITY AXIAL: CPT

## 2018-07-19 ENCOUNTER — TELEPHONE (OUTPATIENT)
Dept: FAMILY MEDICINE CLINIC | Facility: CLINIC | Age: 83
End: 2018-07-19

## 2018-07-19 RX ORDER — LOSARTAN POTASSIUM 50 MG/1
50 TABLET ORAL DAILY
Qty: 30 TABLET | Refills: 5 | Status: SHIPPED | OUTPATIENT
Start: 2018-07-19 | End: 2018-12-31 | Stop reason: SDUPTHER

## 2018-07-19 NOTE — TELEPHONE ENCOUNTER
Cozaar 50  Goal for your blood pressure is 130 range top and 80 range bottom and you feeling better. Use us/or home monitoring to prove this.

## 2019-01-02 RX ORDER — LOSARTAN POTASSIUM 50 MG/1
50 TABLET ORAL DAILY
Qty: 90 TABLET | Refills: 1 | Status: SHIPPED | OUTPATIENT
Start: 2019-01-02 | End: 2019-07-02 | Stop reason: SDUPTHER

## 2019-05-06 ENCOUNTER — HOSPITAL ENCOUNTER (OUTPATIENT)
Dept: WOMENS IMAGING | Age: 84
Discharge: HOME | End: 2019-05-06
Payer: MEDICARE

## 2019-05-06 ENCOUNTER — OFFICE VISIT (OUTPATIENT)
Dept: SURGERY | Age: 84
End: 2019-05-06
Payer: MEDICARE

## 2019-05-06 VITALS
BODY MASS INDEX: 23.04 KG/M2 | DIASTOLIC BLOOD PRESSURE: 65 MMHG | HEART RATE: 60 BPM | SYSTOLIC BLOOD PRESSURE: 138 MMHG | WEIGHT: 130 LBS | HEIGHT: 63 IN

## 2019-05-06 DIAGNOSIS — Z12.39 SCREENING BREAST EXAMINATION: ICD-10-CM

## 2019-05-06 DIAGNOSIS — Z12.39 SCREENING BREAST EXAMINATION: Primary | ICD-10-CM

## 2019-05-06 PROCEDURE — 99213 OFFICE O/P EST LOW 20 MIN: CPT | Performed by: PHYSICIAN ASSISTANT

## 2019-05-06 PROCEDURE — 77063 BREAST TOMOSYNTHESIS BI: CPT

## 2019-05-06 PROCEDURE — G8400 PT W/DXA NO RESULTS DOC: HCPCS | Performed by: PHYSICIAN ASSISTANT

## 2019-05-06 PROCEDURE — G8427 DOCREV CUR MEDS BY ELIG CLIN: HCPCS | Performed by: PHYSICIAN ASSISTANT

## 2019-05-06 PROCEDURE — 1090F PRES/ABSN URINE INCON ASSESS: CPT | Performed by: PHYSICIAN ASSISTANT

## 2019-05-06 PROCEDURE — 1123F ACP DISCUSS/DSCN MKR DOCD: CPT | Performed by: PHYSICIAN ASSISTANT

## 2019-05-06 PROCEDURE — 1036F TOBACCO NON-USER: CPT | Performed by: PHYSICIAN ASSISTANT

## 2019-05-06 PROCEDURE — 4040F PNEUMOC VAC/ADMIN/RCVD: CPT | Performed by: PHYSICIAN ASSISTANT

## 2019-05-06 PROCEDURE — G8420 CALC BMI NORM PARAMETERS: HCPCS | Performed by: PHYSICIAN ASSISTANT

## 2019-05-06 NOTE — PROGRESS NOTES
HPI:  Jadyn Diamond is in for yearly follow-up breast check. She has not noticed any changes in her breasts. DIONNE DIGITAL SCREEN W OR WO CAD BILATERAL 5/6/2019 11:33 AM   HISTORY: Z12.31     ADDITIONAL HISTORY: Left breast biopsy in 2005. COMPARISON STUDIES: Screening mammogram April 2018, April 2017, April 2016. BREAST COMPOSITION: Category A -- Almost entirely fat (less than 25%   glandular tissue). FINDINGS:    2-D and 3-D digital mammography of bilateral breasts was obtained in   the CC and MLO projection. CAD was also utilized for assistance in   diagnosis. No suspicious masses or calcifications are identified. There has been   no interval change. Lateral left breast biopsy clip is stable. No skin   changes are noted. IMPRESSION AND RECOMMENDATION:    No mammographic evidence of malignancy. Recommendation is for the   patient to return for routine mammography in one year or sooner, if   clinically indicated. BI-RADS Category 2, benign. BREAST EXAM:  On examination, she has fibrocystic changes throughout both breasts, no dominant masses, no skin or nipple changes and no axillary adenopathy. I see nothing suspicious for breast cancer. ASSESSMENT:  Benign fibrocystic changes                              DISCUSSION:  I have stressed the importance of self breast exam and have explained the technique to her. We also discussed the pathophysiology of fibrocystic disease and breast cancer. She expresses good understanding. We also discussed multiple other issues regarding her and her family's health. PLAN:  I will plan to see her back in 1 year for physical exam and bilateral mammograms. She will contact me if anything significant changes. I spent over 50% of visit time counseling patient. 15 minutes of face to face time with patient.

## 2019-05-13 DIAGNOSIS — E89.40 SURGICAL MENOPAUSE: ICD-10-CM

## 2019-05-13 DIAGNOSIS — M81.0 OSTEOPOROSIS, UNSPECIFIED OSTEOPOROSIS TYPE, UNSPECIFIED PATHOLOGICAL FRACTURE PRESENCE: ICD-10-CM

## 2019-05-13 DIAGNOSIS — D64.9 ANEMIA, UNSPECIFIED TYPE: ICD-10-CM

## 2019-05-13 DIAGNOSIS — Z00.00 WELLNESS EXAMINATION: ICD-10-CM

## 2019-05-13 DIAGNOSIS — I50.9 CONGESTIVE HEART FAILURE, UNSPECIFIED HF CHRONICITY, UNSPECIFIED HEART FAILURE TYPE (HCC): ICD-10-CM

## 2019-05-13 DIAGNOSIS — I10 ESSENTIAL HYPERTENSION: Primary | Chronic | ICD-10-CM

## 2019-07-02 RX ORDER — LOSARTAN POTASSIUM 50 MG/1
50 TABLET ORAL DAILY
Qty: 90 TABLET | Refills: 3 | Status: SHIPPED | OUTPATIENT
Start: 2019-07-02 | End: 2020-06-29

## 2020-06-04 ENCOUNTER — OFFICE VISIT (OUTPATIENT)
Dept: SURGERY | Age: 85
End: 2020-06-04
Payer: MEDICARE

## 2020-06-04 ENCOUNTER — HOSPITAL ENCOUNTER (OUTPATIENT)
Dept: WOMENS IMAGING | Age: 85
Discharge: HOME | End: 2020-06-04
Payer: MEDICARE

## 2020-06-04 VITALS
BODY MASS INDEX: 23.92 KG/M2 | WEIGHT: 135 LBS | HEART RATE: 66 BPM | SYSTOLIC BLOOD PRESSURE: 138 MMHG | HEIGHT: 63 IN | DIASTOLIC BLOOD PRESSURE: 72 MMHG | TEMPERATURE: 99 F

## 2020-06-04 PROCEDURE — G8400 PT W/DXA NO RESULTS DOC: HCPCS | Performed by: PHYSICIAN ASSISTANT

## 2020-06-04 PROCEDURE — G8427 DOCREV CUR MEDS BY ELIG CLIN: HCPCS | Performed by: PHYSICIAN ASSISTANT

## 2020-06-04 PROCEDURE — 4004F PT TOBACCO SCREEN RCVD TLK: CPT | Performed by: PHYSICIAN ASSISTANT

## 2020-06-04 PROCEDURE — 1123F ACP DISCUSS/DSCN MKR DOCD: CPT | Performed by: PHYSICIAN ASSISTANT

## 2020-06-04 PROCEDURE — 4040F PNEUMOC VAC/ADMIN/RCVD: CPT | Performed by: PHYSICIAN ASSISTANT

## 2020-06-04 PROCEDURE — G8420 CALC BMI NORM PARAMETERS: HCPCS | Performed by: PHYSICIAN ASSISTANT

## 2020-06-04 PROCEDURE — 1090F PRES/ABSN URINE INCON ASSESS: CPT | Performed by: PHYSICIAN ASSISTANT

## 2020-06-04 PROCEDURE — 99213 OFFICE O/P EST LOW 20 MIN: CPT | Performed by: PHYSICIAN ASSISTANT

## 2020-06-04 PROCEDURE — 77063 BREAST TOMOSYNTHESIS BI: CPT

## 2020-06-04 RX ORDER — UBIDECARENONE 75 MG
50 CAPSULE ORAL DAILY
COMMUNITY

## 2020-06-04 RX ORDER — LOSARTAN POTASSIUM 50 MG/1
TABLET ORAL
COMMUNITY
Start: 2020-03-31

## 2020-06-16 ENCOUNTER — LAB (OUTPATIENT)
Dept: FAMILY MEDICINE CLINIC | Facility: CLINIC | Age: 85
End: 2020-06-16

## 2020-06-16 DIAGNOSIS — I10 ESSENTIAL HYPERTENSION: Primary | ICD-10-CM

## 2020-06-16 DIAGNOSIS — I50.9 CONGESTIVE HEART FAILURE, UNSPECIFIED HF CHRONICITY, UNSPECIFIED HEART FAILURE TYPE (HCC): ICD-10-CM

## 2020-06-16 DIAGNOSIS — M81.0 OSTEOPOROSIS, UNSPECIFIED OSTEOPOROSIS TYPE, UNSPECIFIED PATHOLOGICAL FRACTURE PRESENCE: ICD-10-CM

## 2020-06-16 DIAGNOSIS — E53.8 VITAMIN B12 DEFICIENCY: ICD-10-CM

## 2020-06-16 DIAGNOSIS — E55.9 VITAMIN D DEFICIENCY: ICD-10-CM

## 2020-06-16 DIAGNOSIS — D64.9 ANEMIA, UNSPECIFIED TYPE: ICD-10-CM

## 2020-06-16 DIAGNOSIS — Z00.00 WELLNESS EXAMINATION: ICD-10-CM

## 2020-06-17 LAB
25(OH)D3+25(OH)D2 SERPL-MCNC: 50.7 NG/ML (ref 30–100)
ALBUMIN SERPL-MCNC: 4.5 G/DL (ref 3.5–5.2)
ALBUMIN/GLOB SERPL: 1.8 G/DL
ALP SERPL-CCNC: 120 U/L (ref 39–117)
ALT SERPL-CCNC: 17 U/L (ref 1–33)
AST SERPL-CCNC: 29 U/L (ref 1–32)
BASOPHILS # BLD AUTO: 0.11 10*3/MM3 (ref 0–0.2)
BASOPHILS NFR BLD AUTO: 1.7 % (ref 0–1.5)
BILIRUB SERPL-MCNC: 0.6 MG/DL (ref 0.2–1.2)
BUN SERPL-MCNC: 17 MG/DL (ref 8–23)
BUN/CREAT SERPL: 19.8 (ref 7–25)
CALCIUM SERPL-MCNC: 9.8 MG/DL (ref 8.6–10.5)
CHLORIDE SERPL-SCNC: 103 MMOL/L (ref 98–107)
CHOLEST SERPL-MCNC: 176 MG/DL (ref 0–200)
CO2 SERPL-SCNC: 27.6 MMOL/L (ref 22–29)
CREAT SERPL-MCNC: 0.86 MG/DL (ref 0.57–1)
EOSINOPHIL # BLD AUTO: 0.32 10*3/MM3 (ref 0–0.4)
EOSINOPHIL NFR BLD AUTO: 5.1 % (ref 0.3–6.2)
ERYTHROCYTE [DISTWIDTH] IN BLOOD BY AUTOMATED COUNT: 13.4 % (ref 12.3–15.4)
FOLATE SERPL-MCNC: >20 NG/ML (ref 4.78–24.2)
GLOBULIN SER CALC-MCNC: 2.5 GM/DL
GLUCOSE SERPL-MCNC: 99 MG/DL (ref 65–99)
HCT VFR BLD AUTO: 43.8 % (ref 34–46.6)
HDLC SERPL-MCNC: 63 MG/DL (ref 40–60)
HGB BLD-MCNC: 14.5 G/DL (ref 12–15.9)
IMM GRANULOCYTES # BLD AUTO: 0.04 10*3/MM3 (ref 0–0.05)
IMM GRANULOCYTES NFR BLD AUTO: 0.6 % (ref 0–0.5)
IRON SERPL-MCNC: 120 MCG/DL (ref 37–145)
LDLC SERPL CALC-MCNC: 89 MG/DL (ref 0–100)
LYMPHOCYTES # BLD AUTO: 1.67 10*3/MM3 (ref 0.7–3.1)
LYMPHOCYTES NFR BLD AUTO: 26.5 % (ref 19.6–45.3)
MCH RBC QN AUTO: 30.5 PG (ref 26.6–33)
MCHC RBC AUTO-ENTMCNC: 33.1 G/DL (ref 31.5–35.7)
MCV RBC AUTO: 92.2 FL (ref 79–97)
MONOCYTES # BLD AUTO: 0.56 10*3/MM3 (ref 0.1–0.9)
MONOCYTES NFR BLD AUTO: 8.9 % (ref 5–12)
NEUTROPHILS # BLD AUTO: 3.61 10*3/MM3 (ref 1.7–7)
NEUTROPHILS NFR BLD AUTO: 57.2 % (ref 42.7–76)
NRBC BLD AUTO-RTO: 0 /100 WBC (ref 0–0.2)
PLATELET # BLD AUTO: 345 10*3/MM3 (ref 140–450)
POTASSIUM SERPL-SCNC: 4.6 MMOL/L (ref 3.5–5.2)
PROT SERPL-MCNC: 7 G/DL (ref 6–8.5)
RBC # BLD AUTO: 4.75 10*6/MM3 (ref 3.77–5.28)
SODIUM SERPL-SCNC: 140 MMOL/L (ref 136–145)
TRIGL SERPL-MCNC: 119 MG/DL (ref 0–150)
TSH SERPL DL<=0.005 MIU/L-ACNC: 1.64 UIU/ML (ref 0.27–4.2)
VIT B12 SERPL-MCNC: 924 PG/ML (ref 211–946)
VLDLC SERPL CALC-MCNC: 23.8 MG/DL
WBC # BLD AUTO: 6.31 10*3/MM3 (ref 3.4–10.8)

## 2020-06-23 ENCOUNTER — OFFICE VISIT (OUTPATIENT)
Dept: FAMILY MEDICINE CLINIC | Facility: CLINIC | Age: 85
End: 2020-06-23

## 2020-06-23 VITALS
WEIGHT: 134 LBS | TEMPERATURE: 98.6 F | SYSTOLIC BLOOD PRESSURE: 158 MMHG | OXYGEN SATURATION: 98 % | RESPIRATION RATE: 16 BRPM | DIASTOLIC BLOOD PRESSURE: 82 MMHG | HEART RATE: 78 BPM | BODY MASS INDEX: 23.74 KG/M2 | HEIGHT: 63 IN

## 2020-06-23 DIAGNOSIS — M81.0 OSTEOPOROSIS, UNSPECIFIED OSTEOPOROSIS TYPE, UNSPECIFIED PATHOLOGICAL FRACTURE PRESENCE: ICD-10-CM

## 2020-06-23 DIAGNOSIS — Z00.00 WELLNESS EXAMINATION: ICD-10-CM

## 2020-06-23 DIAGNOSIS — I50.9 CONGESTIVE HEART FAILURE, UNSPECIFIED HF CHRONICITY, UNSPECIFIED HEART FAILURE TYPE (HCC): ICD-10-CM

## 2020-06-23 DIAGNOSIS — I10 ESSENTIAL HYPERTENSION: Primary | Chronic | ICD-10-CM

## 2020-06-23 DIAGNOSIS — Z79.01 ANTICOAGULATED: ICD-10-CM

## 2020-06-23 DIAGNOSIS — Z86.2 HISTORY OF ANEMIA: ICD-10-CM

## 2020-06-23 PROCEDURE — 99213 OFFICE O/P EST LOW 20 MIN: CPT | Performed by: FAMILY MEDICINE

## 2020-06-23 PROCEDURE — G0439 PPPS, SUBSEQ VISIT: HCPCS | Performed by: FAMILY MEDICINE

## 2020-06-23 RX ORDER — UBIDECARENONE 75 MG
50 CAPSULE ORAL
COMMUNITY

## 2020-06-23 NOTE — PROGRESS NOTES
Subjective   Hillary Overton is a 84 y.o. female presenting with chief complaint of:   Chief Complaint   Patient presents with   • Medicare Wellness-subsequent       History of Present Illness :  Alone.       Has multiple chronic problems to consider that might have a bearing on today's issues; an interval appointment.       Chronic/acute problems reviewed today:   1. Essential hypertension Chronic/stable. Stable here past/no recent home blood pressures.  No significant chest pain, SOB, LE edema, orthopnea, near syncope, dizziness/light headness.   Recent Vitals       5/9/2017 5/10/2018 6/23/2020       BP:  124/70  138/68  158/82     Pulse:  82  84  78     Temp:  98.5 °F (36.9 °C)  97.8 °F (36.6 °C)  98.6 °F (37 °C)     Weight:  62.6 kg (138 lb)  57.2 kg (126 lb)  60.8 kg (134 lb)     BMI (Calculated):  24.6  22.5  23.9            2. Congestive heart failure, unspecified HF chronicity, unspecified heart failure type (CMS/HCC)Chronic/stable.  Denies significant sob, orthopnea, leg edema, weight gain.  Aware of influence diet/salt and watching weight at home.        3. Osteoporosis, unspecified osteoporosis type, unspecified pathological fracture presence Chronic/stable.  Last bone density/if below.   Declines Rx.  Declines past ? further bone density screening when due.      4. Anticoagulated prevention/ASA 81 Chronic/stable reason and use of.  Denies bleeding issues; especially epistaxis, melena, hematochezia.  Upper arms/others do bruise easily.  No significant bleeding or falls.      5. History of anemia Chronic or past history/stable for awhile: This has been present before.    There has been GI evaulation in the past. There is no current melena, hematochezia. It has been benign to date and stable/watching.  Contributing comorbidities to date: Benign.     6. Wellness examination-done Chronic ongoing over time screening or advice for general health care/wellness.        Has an/another acute issue today: none.    The  following portions of the patient's history were reviewed and updated as appropriate: allergies, current medications, past family history, past medical history, past social history, past surgical history and problem list.      Current Outpatient Medications:   •  aspirin 81 MG EC tablet, Take 1 tablet by mouth Daily., Disp: , Rfl:   •  B Complex Vitamins (B COMPLEX PO), Take 1 tablet by mouth Daily., Disp: , Rfl:   •  BIOTIN 5000 PO, Take 1 capsule by mouth Daily., Disp: , Rfl:   •  Calcium Carb-Cholecalciferol (CALCIUM-VITAMIN D) 600-400 MG-UNIT tablet, Take 1 tablet by mouth Daily., Disp: , Rfl:   •  losartan (COZAAR) 50 MG tablet, TAKE 1 TABLET BY MOUTH DAILY, Disp: 90 tablet, Rfl: 3  •  Multiple Vitamins-Minerals (CENTRUM SILVER ULTRA WOMENS) tablet, Take 1 tablet by mouth Daily., Disp: , Rfl:   •  Omega-3 Fatty Acids (FISH OIL) 1000 MG capsule capsule, Take 1,000 mg by mouth Daily With Breakfast., Disp: , Rfl:   •  vitamin B-12 (CYANOCOBALAMIN) 100 MCG tablet, Take 50 mcg by mouth., Disp: , Rfl:     No problems with medications.  Refills if needed done    No Known Allergies    Review of Systems  GENERAL:  Active/slower with limits, speed, stamina for age. Sleep is ok. No fever now.  SKIN: No rash/skin lesion of concern:   ENDO:  No syncope, near or diaphoretic sweaty spells.  HEENT: No recent head injury; or headache.   No vision change.  No significant hearing loss.  Ears without pain/drainage.  No sore throat.  No significant nasal/sinus congestion/drainage. No epistaxis.  CHEST: No chest wall tenderness or mass. No cough, without wheeze.  No SOB; no hemoptysis.  CV: No chest pain, palpitations, ankle edema.  GI: No heartburn, dysphagia.  No abdominal pain, diarrhea, constipation.  No rectal bleeding, or melena.    :  Voids without dysuria, or incontinence to completion.  ORTHO: No painful/swollen joints but various on /off sore.  No sore neck or back.  No acute neck or back pain without recent  injury.  NEURO: No dizziness, weakness of extremities.  No numbness/paresthesias.   PSYCH: No memory loss.  Mood good; not anxious, depressed but/and not suicidal.  Tries to tolerate stress .   Screening:  Mammogram: 6.4.20  Bone density:   8.22.12/8.23.12 - [facility]-bone density LS T-2.5 hip T -1.5 some better..be sure Ca/Vit D normal...another 2 years of this..repeat bone density then..//Patient infomred. bc-offered  Low dose CT chest: Tobacco-smoker/age 20/0.5 ppd/dc 52  GI:   EGD-Inflammatory change of bulb/[facility]//[place]/08-24-05  H pylori/[facility]/8-24-05  Colonoscopy+neg/[facility]//11-17-10/10y  Prostate: NA  Usual lab order  12m CBC, CMP, LIPID, TSH, Vit D, iron, B12, folate    Lab Results:  Results for orders placed or performed in visit on 06/16/20   Comprehensive metabolic panel   Result Value Ref Range    Glucose 99 65 - 99 mg/dL    BUN 17 8 - 23 mg/dL    Creatinine 0.86 0.57 - 1.00 mg/dL    eGFR Non African Am 63 >60 mL/min/1.73    eGFR African Am 77 >60 mL/min/1.73    BUN/Creatinine Ratio 19.8 7.0 - 25.0    Sodium 140 136 - 145 mmol/L    Potassium 4.6 3.5 - 5.2 mmol/L    Chloride 103 98 - 107 mmol/L    Total CO2 27.6 22.0 - 29.0 mmol/L    Calcium 9.8 8.6 - 10.5 mg/dL    Total Protein 7.0 6.0 - 8.5 g/dL    Albumin 4.50 3.50 - 5.20 g/dL    Globulin 2.5 gm/dL    A/G Ratio 1.8 g/dL    Total Bilirubin 0.6 0.2 - 1.2 mg/dL    Alkaline Phosphatase 120 (H) 39 - 117 U/L    AST (SGOT) 29 1 - 32 U/L    ALT (SGPT) 17 1 - 33 U/L   Lipid panel   Result Value Ref Range    Total Cholesterol 176 0 - 200 mg/dL    Triglycerides 119 0 - 150 mg/dL    HDL Cholesterol 63 (H) 40 - 60 mg/dL    VLDL Cholesterol 23.8 mg/dL    LDL Cholesterol  89 0 - 100 mg/dL   TSH   Result Value Ref Range    TSH 1.640 0.270 - 4.200 uIU/mL   Vitamin D 25 Hydroxy   Result Value Ref Range    25 Hydroxy, Vitamin D 50.7 30.0 - 100.0 ng/ml   Iron   Result Value Ref Range    Iron 120 37 - 145 mcg/dL   Vitamin B12   Result Value Ref Range    Vitamin B-12 924 211  "- 946 pg/mL   Folate   Result Value Ref Range    Folate >20.00 4.78 - 24.20 ng/mL   CBC & Differential   Result Value Ref Range    WBC 6.31 3.40 - 10.80 10*3/mm3    RBC 4.75 3.77 - 5.28 10*6/mm3    Hemoglobin 14.5 12.0 - 15.9 g/dL    Hematocrit 43.8 34.0 - 46.6 %    MCV 92.2 79.0 - 97.0 fL    MCH 30.5 26.6 - 33.0 pg    MCHC 33.1 31.5 - 35.7 g/dL    RDW 13.4 12.3 - 15.4 %    Platelets 345 140 - 450 10*3/mm3    Neutrophil Rel % 57.2 42.7 - 76.0 %    Lymphocyte Rel % 26.5 19.6 - 45.3 %    Monocyte Rel % 8.9 5.0 - 12.0 %    Eosinophil Rel % 5.1 0.3 - 6.2 %    Basophil Rel % 1.7 (H) 0.0 - 1.5 %    Neutrophils Absolute 3.61 1.70 - 7.00 10*3/mm3    Lymphocytes Absolute 1.67 0.70 - 3.10 10*3/mm3    Monocytes Absolute 0.56 0.10 - 0.90 10*3/mm3    Eosinophils Absolute 0.32 0.00 - 0.40 10*3/mm3    Basophils Absolute 0.11 0.00 - 0.20 10*3/mm3    Immature Granulocyte Rel % 0.6 (H) 0.0 - 0.5 %    Immature Grans Absolute 0.04 0.00 - 0.05 10*3/mm3    nRBC 0.0 0.0 - 0.2 /100 WBC       A1C:No results for input(s): HGBA1C in the last 31038 hours.  PSA:No results for input(s): PSA in the last 84270 hours.  CBC:  Lab Results - Last 18 Months   Lab Units 06/16/20  0810   WBC 10*3/mm3 6.31   HEMOGLOBIN g/dL 14.5   HEMATOCRIT % 43.8   PLATELETS 10*3/mm3 345   IRON mcg/dL 120      BMP/CMP:  Lab Results - Last 18 Months   Lab Units 06/16/20  0810   SODIUM mmol/L 140   POTASSIUM mmol/L 4.6   CHLORIDE mmol/L 103   TOTAL CO2 mmol/L 27.6   GLUCOSE mg/dL 99   BUN mg/dL 17   CREATININE mg/dL 0.86   EGFR IF NONAFRICN AM mL/min/1.73 63   EGFR IF AFRICN AM mL/min/1.73 77   CALCIUM mg/dL 9.8     HEPATIC:  Lab Results - Last 18 Months   Lab Units 06/16/20  0810   ALT (SGPT) U/L 17   AST (SGOT) U/L 29   ALK PHOS U/L 120*     THYROID:  Lab Results - Last 18 Months   Lab Units 06/16/20  0810   TSH uIU/mL 1.640       Objective   /82   Pulse 78   Temp 98.6 °F (37 °C)   Resp 16   Ht 159.4 cm (62.75\")   Wt 60.8 kg (134 lb)   SpO2 98%   " Breastfeeding No   BMI 23.93 kg/m²   Body mass index is 23.93 kg/m².     Recent Vitals       5/9/2017 5/10/2018 6/23/2020       BP:  124/70  138/68  158/82     Pulse:  82  84  78     Temp:  98.5 °F (36.9 °C)  97.8 °F (36.6 °C)  98.6 °F (37 °C)     Weight:  62.6 kg (138 lb)  57.2 kg (126 lb)  60.8 kg (134 lb)     BMI (Calculated):  24.6  22.5  23.9           Physical Exam  GENERAL:  Well nourished/developed in no acute distress.   SKIN: Turgor excellent, without wound, rash, lesion  HEENT: Normal cephalic without trauma.  Pupils equal round reactive to light. Extraocular motions full without nystagmus.   External canals nonobstructive nontender without reddness. Tymphatic membranes cleve with edmund structures intact.   Oral cavity without growths, exudates, and moist.  Posterior pharynx without mass, obstruction, redness.  No thyromegaly, mass, tenderness, lymphadenopathy and supple.  CV: Regular rhythm.  No murmur, gallop,  edema. Posterior pulses intact.  No carotid bruits.  CHEST: No chest wall tenderness or mass.   LUNGS: Symmetric motion with clear to auscultation.    ABD: Soft, nontender without mass.   ORTHO: Symmetric extremities without swelling/point tenderness.  Full gross range of motion.  NEURO: CN 2-12 grossly intact.  Symmetric facies and UE/LE. 3/5 strength throughout. 1/4 x bicep  equal reflexes.  Nonfocal use extremities. Speech clear. Intact light touch with monofilament, vibratory sensation with tuning fork; equal toes/distal feet.    PSYCH: Oriented x 3.  Pleasant calm, well kept.  Purposeful/directed conservation with intact short/long gross memory.     Assessment/Plan     1. Essential hypertension    2. Congestive heart failure, unspecified HF chronicity, unspecified heart failure type (CMS/HCC)    3. Osteoporosis, unspecified osteoporosis type, unspecified pathological fracture presence    4. Anticoagulated prevention/ASA 81    5. History of anemia    6. Wellness examination-done      Rx:  reviewed/changes:  No orders of the defined types were placed in this encounter.    LAB/Testing/Referrals: reviewed/orders:   Today:   Orders Placed This Encounter   Procedures   • DEXA Bone Density Axial     Chronic/recurrent labs above or change to:   same     Discussions:   Labs look generally good  She has significant risk for worsening of the osteoporosis that was discovered service years ago and she may not be getting adequate treatment.  Wellness discussion  COVID19 advice given to shelter in place, how to acquire groceries/Rx and other suggestions (included using mask and how to use mask if out in public/around public); advised of importance to avoid catching with age/comorbidities.    Body mass index is 23.93 kg/m².  Patient's Body mass index is 23.93 kg/m². BMI is within normal parameters. No follow-up required..  Tobacco use reviewed:    Non-smoker  Hillary Overton  reports that she quit smoking about 30 years ago. She started smoking about 64 years ago. She smoked 0.50 packs per day. She has never used smokeless tobacco..  Annual/wellness visit also    Patient Instructions     Your blood pressure was ok for age today at 158/82.  It is often a problem for blood pressures in the doctor's office to be higher than home (called white coat syndrome).   Goals for your blood pressure are 130-150 range top and 80-90 range bottom and you feeling ok.     We really advise rechecking your blood pressure at home (or with a friend) daily to every other day for the next several days and let us know if your pattern is not the goals above.  If you will do this make sure you control variables that can make your blood pressure show higher than it really is:   A. Use a machine that measures your blood pressure at the upper arm level; not wrist or lower  B. Take off any shirts/garmets and apply the cuff to your bare arm  C. Be sure and rest your arm being used on a table/like so it is totally supported  D. Do not cross your  "legs while taking your blood pressure  E. Be calm, rested and not upset/anxious in any way while taking your blood pressure  F. Avoid talking while taking the blood pressure  G. Be sure your back is supported and not in a strain while taking your blood pressure.     If you cannot do this at home/with a friend OR want it done here we are happy to make appointments here for that (we only charge/bill for a nurse visit for doing this).      Please CALL US if your blood pressure stays up as that probably means you have a problem; we likely will adjust things even over the phone.  We want your blood pressure to be normal.     OMRON is a reliable/common home blood automatic blood pressure device that can range around 50-75$.     ########################    Medicare/insurances offer certain visits called \"wellness/annual\" that allows for time to deal with and  review the many aspects of \"being well\" that just might not get mentioned during other visits with your doctor through the year.  This includes things like reviews of health screenings (mammograms, various labs),  weight, exercise, vaccines for just a few examples.      In order to help you with this we wish to make you aware of a few things for you to consider:    1. Advanced directives.  These are documents used to help direct your care if your health/situation should reach a point that you cannot make your own decisions.  While it is likely you do not currently have a need for these documents now; it is something that we all might face at any time.   The hand outs you are being given today are simply for you to review and use to learn more about these documents and consider them as you wish.      2. Vaccines: Certain vaccines are important after age 50, 60, and 65 and some health situations (for example COPD), require even boosters beyond age 65.  We are happy to review with you your vaccine status and vaccines that might be needed for you at this point:      a. " Tetanus.   Like anyone this needs to given every 10 years; sooner for/with lacerations/wounds.   Likely when getting this booster it needs to be a tetanus called Tdap (tetanus mixed with diptheria and pertussis).   Years ago you had this vaccine.  We now know we can lose our immunity to pertussis (a part of this vaccine) and run a risk of catching this.  Now only would this make us ill; but more importantly we can spread this to very young children (and for them it can be a much more dangerous illness).   We call this the grandparent vaccine for this reason.     b. Pneumonia (strept).   This comes now with two brands.   It is recommended to take pneumovax first; and a year later take the cousin prevnar.  Even if you have had these before; we need to review when and your current health situation/s as you may need boosters and even recently the CDC has made recent/new recommendations for pneumovax.      c. Shingles.  You do not want to catch shingles.  Though you will recover from this; the pain associated with shingles can be severe.  Even if you have had the now older zostavax, or have had shingles; it is recommended you still get the Shingrix (the new vaccine just available early 2018 shingles vaccine).  A new shingles vaccine (a shot to lower your chance of catching shingles) is now available (shingrix).  This vaccine is the second vaccine created for this purpose; (we have had zostavax for years).  Shingrix provides a much better and longer immunity for shingles than zostavax.  For this and other reasons Shingrix can be started at age 50.  If you have had zostavax in the past; you can still take Shingrix.      This vaccine is not paid for in a doctor's office by medicare, medicaid and probably most insurances.  Like zostavax; this is covered in drug stores.  This is a vaccine that if you chose to get you need to get at a drug store that gives vaccines (like [facility], [facility] and [facility].       d. Yearly flu vaccine given from September through April each year (there is a special vaccine for those over 65).     e. Travel vaccines:  If you are one to do international travel; be sure and ask us for any particular unusual vaccines you may need.     f.  Miscellaneous:  If you have certain health situations/disease you may need specific/particular vaccines not give to the general public.     The vaccines we have on record for you include:   Immunization History   Administered Date(s) Administered   • Flu Vaccine Quad PF >18YRS 11/23/2016, 10/31/2017   • Pneumococcal Conjugate 13-Valent (PCV13) 10/21/2015   • Pneumococcal, Unspecified 10/09/2009       If you have record of other vaccines and want them to show in your chart here; please talk to our nurses about having your vaccine record updated.     3. Exercise: regular cardio exercise something everyone should consider and try to do; even if health limitations (ie find that exercise UE/LE/cardio that they can tolerate).   Normal weight a goal for everyone (as we discussed)    4. Healthy diet helpful for weight management, illness prevention.     5. If over 50-screening exams include men PSA/rectal exam, women mammograms, and everyone colonoscopy screening for colon cancer.    6. If you use tobacco of any kind or e-products you should stop. We are providing you some information to consider that could make this process easier.      ##################################                  Follow up: Return for lab;, Dr Wright-, 12m;.  Future Appointments   Date Time Provider Department Center   6/21/2021  8:10 AM LAB [facility] None   6/23/2021  1:00 PM Victor Manuel Wright MD [facility] None

## 2020-06-23 NOTE — PATIENT INSTRUCTIONS
"Your blood pressure was ok for age today at 158/82.  It is often a problem for blood pressures in the doctor's office to be higher than home (called white coat syndrome).   Goals for your blood pressure are 130-150 range top and 80-90 range bottom and you feeling ok.     We really advise rechecking your blood pressure at home (or with a friend) daily to every other day for the next several days and let us know if your pattern is not the goals above.  If you will do this make sure you control variables that can make your blood pressure show higher than it really is:   A. Use a machine that measures your blood pressure at the upper arm level; not wrist or lower  B. Take off any shirts/garmets and apply the cuff to your bare arm  C. Be sure and rest your arm being used on a table/like so it is totally supported  D. Do not cross your legs while taking your blood pressure  E. Be calm, rested and not upset/anxious in any way while taking your blood pressure  F. Avoid talking while taking the blood pressure  G. Be sure your back is supported and not in a strain while taking your blood pressure.     If you cannot do this at home/with a friend OR want it done here we are happy to make appointments here for that (we only charge/bill for a nurse visit for doing this).      Please CALL US if your blood pressure stays up as that probably means you have a problem; we likely will adjust things even over the phone.  We want your blood pressure to be normal.     OMRON is a reliable/common home blood automatic blood pressure device that can range around 50-75$.     ########################    Medicare/insurances offer certain visits called \"wellness/annual\" that allows for time to deal with and  review the many aspects of \"being well\" that just might not get mentioned during other visits with your doctor through the year.  This includes things like reviews of health screenings (mammograms, various labs),  weight, exercise, vaccines " for just a few examples.      In order to help you with this we wish to make you aware of a few things for you to consider:    1. Advanced directives.  These are documents used to help direct your care if your health/situation should reach a point that you cannot make your own decisions.  While it is likely you do not currently have a need for these documents now; it is something that we all might face at any time.   The hand outs you are being given today are simply for you to review and use to learn more about these documents and consider them as you wish.      2. Vaccines: Certain vaccines are important after age 50, 60, and 65 and some health situations (for example COPD), require even boosters beyond age 65.  We are happy to review with you your vaccine status and vaccines that might be needed for you at this point:      a. Tetanus.   Like anyone this needs to given every 10 years; sooner for/with lacerations/wounds.   Likely when getting this booster it needs to be a tetanus called Tdap (tetanus mixed with diptheria and pertussis).   Years ago you had this vaccine.  We now know we can lose our immunity to pertussis (a part of this vaccine) and run a risk of catching this.  Now only would this make us ill; but more importantly we can spread this to very young children (and for them it can be a much more dangerous illness).   We call this the grandparent vaccine for this reason.     b. Pneumonia (strept).   This comes now with two brands.   It is recommended to take pneumovax first; and a year later take the cousin prevnar.  Even if you have had these before; we need to review when and your current health situation/s as you may need boosters and even recently the CDC has made recent/new recommendations for pneumovax.      c. Shingles.  You do not want to catch shingles.  Though you will recover from this; the pain associated with shingles can be severe.  Even if you have had the now older zostavax, or have had  shingles; it is recommended you still get the Shingrix (the new vaccine just available early 2018 shingles vaccine).  A new shingles vaccine (a shot to lower your chance of catching shingles) is now available (shingrix).  This vaccine is the second vaccine created for this purpose; (we have had zostavax for years).  Shingrix provides a much better and longer immunity for shingles than zostavax.  For this and other reasons Shingrix can be started at age 50.  If you have had zostavax in the past; you can still take Shingrix.      This vaccine is not paid for in a doctor's office by medicare, medicaid and probably most insurances.  Like zostavax; this is covered in drug stores.  This is a vaccine that if you chose to get you need to get at a drug store that gives vaccines (like [facility], [facility] and [facility].      d. Yearly flu vaccine given from September through April each year (there is a special vaccine for those over 65).     e. Travel vaccines:  If you are one to do international travel; be sure and ask us for any particular unusual vaccines you may need.     f.  Miscellaneous:  If you have certain health situations/disease you may need specific/particular vaccines not give to the general public.     The vaccines we have on record for you include:   Immunization History   Administered Date(s) Administered   • Flu Vaccine Quad PF >18YRS 11/23/2016, 10/31/2017   • Pneumococcal Conjugate 13-Valent (PCV13) 10/21/2015   • Pneumococcal, Unspecified 10/09/2009       If you have record of other vaccines and want them to show in your chart here; please talk to our nurses about having your vaccine record updated.     3. Exercise: regular cardio exercise something everyone should consider and try to do; even if health limitations (ie find that exercise UE/LE/cardio that they can tolerate).   Normal weight a goal for everyone (as we discussed)    4. Healthy diet helpful for weight management, illness  prevention.     5. If over 50-screening exams include men PSA/rectal exam, women mammograms, and everyone colonoscopy screening for colon cancer.    6. If you use tobacco of any kind or e-products you should stop. We are providing you some information to consider that could make this process easier.      ##################################

## 2020-06-24 NOTE — PROGRESS NOTES
The ABCs of the Annual Wellness Visit  Subsequent Medicare Wellness Visit    Chief Complaint   Patient presents with   • Medicare Wellness-subsequent       Subjective   History of Present Illness:  Hillary Overton is a 84 y.o. female who presents for a Subsequent Medicare Wellness Visit.    HEALTH RISK ASSESSMENT    Recent Hospitalizations:  No hospitalization(s) within the last year.    Current Medical Providers:  Patient Care Team:  Victor Manuel Wright MD as PCP - General    Smoking Status:  Social History     Tobacco Use   Smoking Status Former Smoker   • Packs/day: 0.50   • Start date: 1955   • Last attempt to quit: 1989   • Years since quittin.6   Smokeless Tobacco Never Used       Alcohol Consumption:  Social History     Substance and Sexual Activity   Alcohol Use No       Depression Screen:   PHQ-2/PHQ-9 Depression Screening 2020   Little interest or pleasure in doing things 0   Feeling down, depressed, or hopeless 0   Trouble falling or staying asleep, or sleeping too much -   Feeling tired or having little energy -   Poor appetite or overeating -   Feeling bad about yourself - or that you are a failure or have let yourself or your family down -   Trouble concentrating on things, such as reading the newspaper or watching television -   Moving or speaking so slowly that other people could have noticed. Or the opposite - being so fidgety or restless that you have been moving around a lot more than usual -   Thoughts that you would be better off dead, or of hurting yourself in some way -   Total Score 0   If you checked off any problems, how difficult have these problems made it for you to do your work, take care of things at home, or get along with other people? -       Fall Risk Screen:  STEADI Fall Risk Assessment was completed, and patient is at LOW risk for falls.Assessment completed on:2020    Health Habits and Functional and Cognitive Screening:  No flowsheet data  found.      Does the patient have evidence of cognitive impairment? No    Asprin use counseling:Taking ASA appropriately as indicated    Age-appropriate Screening Schedule:  Refer to the list below for future screening recommendations based on patient's age, sex and/or medical conditions. Orders for these recommended tests are listed in the plan section. The patient has been provided with a written plan.    Health Maintenance   Topic Date Due   • TDAP/TD VACCINES (1 - Tdap) 11/19/1946   • ZOSTER VACCINE (1 of 2) 11/19/1987   • DXA SCAN  05/08/2017   • INFLUENZA VACCINE  08/01/2020          The following portions of the patient's history were reviewed and updated as appropriate: allergies, current medications, past family history, past medical history, past social history, past surgical history and problem list.    Outpatient Medications Prior to Visit   Medication Sig Dispense Refill   • aspirin 81 MG EC tablet Take 1 tablet by mouth Daily.     • B Complex Vitamins (B COMPLEX PO) Take 1 tablet by mouth Daily.     • BIOTIN 5000 PO Take 1 capsule by mouth Daily.     • Calcium Carb-Cholecalciferol (CALCIUM-VITAMIN D) 600-400 MG-UNIT tablet Take 1 tablet by mouth Daily.     • losartan (COZAAR) 50 MG tablet TAKE 1 TABLET BY MOUTH DAILY 90 tablet 3   • Multiple Vitamins-Minerals (CENTRUM SILVER ULTRA WOMENS) tablet Take 1 tablet by mouth Daily.     • Omega-3 Fatty Acids (FISH OIL) 1000 MG capsule capsule Take 1,000 mg by mouth Daily With Breakfast.     • vitamin B-12 (CYANOCOBALAMIN) 100 MCG tablet Take 50 mcg by mouth.     • raloxifene (EVISTA) 60 MG tablet TAKE 1 TABLET BY MOUTH EVERY DAY 30 tablet 5     No facility-administered medications prior to visit.        Patient Active Problem List   Diagnosis   • Wellness examination-done   • Concussion   • Congestive heart failure-diastolic   • Hypertension   • Fibrocystic breast disease   • Surgical menopause-see osteoporosis   • Osteoporosis 2016/?   • History of anemia   •  Nodule of neck-R submandibular   • Headache   • Anticoagulated prevention/ASA 81   • Laboratory test*       Advanced Care Planning:  ACP discussion was held with the patient during this visit. Patient has an advance directive (not in EMR), copy requested.    Review of Systems  GENERAL:  Active/slower with limits, speed, stamina for age. Sleep is ok. No fever now.  SKIN: No rash/skin lesion of concern:   ENDO:  No syncope, near or diaphoretic sweaty spells.  HEENT: No recent head injury; or headache.   No vision change.  No significant hearing loss.  Ears without pain/drainage.  No sore throat.  No significant nasal/sinus congestion/drainage. No epistaxis.  CHEST: No chest wall tenderness or mass. No cough, without wheeze.  No SOB; no hemoptysis.  CV: No chest pain, palpitations, ankle edema.  GI: No heartburn, dysphagia.  No abdominal pain, diarrhea, constipation.  No rectal bleeding, or melena.    :  Voids without dysuria, or incontinence to completion.  ORTHO: No painful/swollen joints but various on /off sore.  No sore neck or back.  No acute neck or back pain without recent injury.  NEURO: No dizziness, weakness of extremities.  No numbness/paresthesias.   PSYCH: No memory loss.  Mood good; not anxious, depressed but/and not suicidal.  Tries to tolerate stress .   Screening:  Mammogram: 6.4.20  Bone density:   8.22.12/8.23.12 - [facility]-bone density LS T-2.5 hip T -1.5 some better..be sure Ca/Vit D normal...another 2 years of this..repeat bone density then..//Patient infomred. bc-offered  Low dose CT chest: Tobacco-smoker/age 20/0.5 ppd/dc 52  GI:   EGD-Inflammatory change of bulb/MM//Khari/08-24-05  H pylori/Khari/8-24-05  Colonoscopy+neg/MM/Hi/11-17-10/10y  Prostate: NA  Usual lab order  12m CBC, CMP, LIPID, TSH, Vit D, iron, B12, folate    Compared to one year ago, the patient feels her physical health is the same.  Compared to one year ago, the patient feels her mental health is the same.    Reviewed  "chart for potential of high risk medication in the elderly: yes  Reviewed chart for potential of harmful drug interactions in the elderly:yes    Objective         Vitals:    06/23/20 1327   BP: 158/82   Pulse: 78   Resp: 16   Temp: 98.6 °F (37 °C)   SpO2: 98%   Weight: 60.8 kg (134 lb)   Height: 159.4 cm (62.75\")       Body mass index is 23.93 kg/m².  Discussed the patient's BMI with her. The BMI is in the acceptable range.    Physical Exam  GENERAL:  Well nourished/developed in no acute distress.   SKIN: Turgor excellent, without wound, rash, lesion  HEENT: Normal cephalic without trauma.  Pupils equal round reactive to light. Extraocular motions full without nystagmus.   External canals nonobstructive nontender without reddness. Tymphatic membranes cleve with edmund structures intact.   Oral cavity without growths, exudates, and moist.  Posterior pharynx without mass, obstruction, redness.  No thyromegaly, mass, tenderness, lymphadenopathy and supple.  CV: Regular rhythm.  No murmur, gallop,  edema. Posterior pulses intact.  No carotid bruits.  CHEST: No chest wall tenderness or mass.   LUNGS: Symmetric motion with clear to auscultation.    ABD: Soft, nontender without mass.   ORTHO: Symmetric extremities without swelling/point tenderness.  Full gross range of motion.  NEURO: CN 2-12 grossly intact.  Symmetric facies and UE/LE. 3/5 strength throughout. 1/4 x bicep  equal reflexes.  Nonfocal use extremities. Speech clear. Intact light touch with monofilament, vibratory sensation with tuning fork; equal toes/distal feet.    PSYCH: Oriented x 3.  Pleasant calm, well kept.  Purposeful/directed conservation with intact short/long gross memory.     Lab Results   Component Value Date    GLU 99 06/16/2020    CHLPL 176 06/16/2020    TRIG 119 06/16/2020    HDL 63 (H) 06/16/2020    LDL 89 06/16/2020    VLDL 23.8 06/16/2020        Assessment/Plan   Medicare Risks and Personalized Health Plan  CMS Preventative Services Quick " Reference  Advance Directive Discussion  Immunizations Discussed/Encouraged (specific immunizations; adacel Tdap, Influenza, Pneumococcal 23, Prevnar and Shingrix )  Osteoprorosis Risk    The above risks/problems have been discussed with the patient.  Pertinent information has been shared with the patient in the After Visit Summary.  Follow up plans and orders are seen below in the Assessment/Plan Section.    Diagnoses and all orders for this visit:    1. Essential hypertension (Primary)    2. Congestive heart failure, unspecified HF chronicity, unspecified heart failure type (CMS/[facility])    3. Osteoporosis, unspecified osteoporosis type, unspecified pathological fracture presence  -     DEXA Bone Density Axial; Future    4. Anticoagulated prevention/ASA 81    5. History of anemia    6. Wellness examination-done      Follow Up:  Return for lab;, Dr Wright-, 12m;.     An After Visit Summary and PPPS were given to the patient.

## 2020-06-29 RX ORDER — LOSARTAN POTASSIUM 50 MG/1
50 TABLET ORAL DAILY
Qty: 90 TABLET | Refills: 3 | Status: SHIPPED | OUTPATIENT
Start: 2020-06-29 | End: 2021-07-06

## 2020-07-23 ENCOUNTER — HOSPITAL ENCOUNTER (OUTPATIENT)
Dept: BONE DENSITY | Facility: HOSPITAL | Age: 85
Discharge: HOME | End: 2020-07-23
Admitting: FAMILY MEDICINE

## 2020-07-23 DIAGNOSIS — M81.0 OSTEOPOROSIS, UNSPECIFIED OSTEOPOROSIS TYPE, UNSPECIFIED PATHOLOGICAL FRACTURE PRESENCE: ICD-10-CM

## 2020-07-23 PROCEDURE — 77080 DXA BONE DENSITY AXIAL: CPT

## 2020-08-04 ENCOUNTER — PRIOR AUTHORIZATION (OUTPATIENT)
Dept: FAMILY MEDICINE CLINIC | Facility: CLINIC | Age: 85
End: 2020-08-04

## 2020-08-04 DIAGNOSIS — M81.0 OSTEOPOROSIS, UNSPECIFIED OSTEOPOROSIS TYPE, UNSPECIFIED PATHOLOGICAL FRACTURE PRESENCE: Primary | ICD-10-CM

## 2020-08-04 NOTE — TELEPHONE ENCOUNTER
"Per Dr Wright   \"Close to stable/little change last   Still; recommend she strongly consider/use prolia 2 yrs and ruthie 2y as at her age; these usually get worse not better if left alone \"      Spoke to patient and would like to see what the cost will be for prolia injection in our office first, then if needed is ok with [facility], advised will run benefit summary through [employer] assist and let her know, pt stated understanding        "

## 2020-08-05 NOTE — TELEPHONE ENCOUNTER
Received notification Prolia approved to [facility] [phone]    Attempted to call pt and unable to leave message

## 2020-08-26 NOTE — TELEPHONE ENCOUNTER
Attempted to call pt and message left TRc  Pt return call and gave her the number to [facility] so she can set herself up an account and how to get her prolia started, pt is agreeable and will call them and let office know

## 2020-09-02 ENCOUNTER — CLINICAL SUPPORT (OUTPATIENT)
Dept: FAMILY MEDICINE CLINIC | Facility: CLINIC | Age: 85
End: 2020-09-02

## 2020-09-02 DIAGNOSIS — M81.0 AGE RELATED OSTEOPOROSIS, UNSPECIFIED PATHOLOGICAL FRACTURE PRESENCE: ICD-10-CM

## 2020-09-02 PROCEDURE — 96372 THER/PROPH/DIAG INJ SC/IM: CPT | Performed by: FAMILY MEDICINE

## 2021-03-02 ENCOUNTER — TELEPHONE (OUTPATIENT)
Dept: FAMILY MEDICINE CLINIC | Facility: CLINIC | Age: 86
End: 2021-03-02

## 2021-03-02 NOTE — TELEPHONE ENCOUNTER
Pt came to office today for her prolia, pt failed to call special[facility] pharmacy for the refill    Called [employer] and advised will get on 3 way call to set up payment and delivery, pt did not answer, rep advised if pt can call back and confirm today that it could be delivered Thursday,     Attempted to call pt and vm left to call her pharmacy and call the office to get rescheduled for her prolia, awaiting return call

## 2021-03-04 ENCOUNTER — CLINICAL SUPPORT (OUTPATIENT)
Dept: FAMILY MEDICINE CLINIC | Facility: CLINIC | Age: 86
End: 2021-03-04

## 2021-03-04 DIAGNOSIS — M81.0 AGE RELATED OSTEOPOROSIS, UNSPECIFIED PATHOLOGICAL FRACTURE PRESENCE: ICD-10-CM

## 2021-03-04 PROCEDURE — 96372 THER/PROPH/DIAG INJ SC/IM: CPT | Performed by: FAMILY MEDICINE

## 2021-03-04 NOTE — PROGRESS NOTES
Pt here for Prolia injection as ordered. Given in R arm . Pt tolerated well and no adverse reactions noted and pt left office. Pt supplied medication

## 2021-06-21 ENCOUNTER — LAB (OUTPATIENT)
Dept: FAMILY MEDICINE CLINIC | Facility: CLINIC | Age: 86
End: 2021-06-21

## 2021-06-21 DIAGNOSIS — Z86.2 HISTORY OF ANEMIA: ICD-10-CM

## 2021-06-21 DIAGNOSIS — I10 ESSENTIAL HYPERTENSION: Primary | ICD-10-CM

## 2021-06-21 DIAGNOSIS — D64.9 ANEMIA, UNSPECIFIED TYPE: ICD-10-CM

## 2021-06-21 DIAGNOSIS — I50.9 CONGESTIVE HEART FAILURE, UNSPECIFIED HF CHRONICITY, UNSPECIFIED HEART FAILURE TYPE (HCC): ICD-10-CM

## 2021-06-21 DIAGNOSIS — E55.9 VITAMIN D DEFICIENCY: ICD-10-CM

## 2021-06-21 DIAGNOSIS — E53.8 VITAMIN B12 DEFICIENCY: ICD-10-CM

## 2021-06-21 DIAGNOSIS — Z00.00 WELLNESS EXAMINATION: ICD-10-CM

## 2021-06-23 ENCOUNTER — OFFICE VISIT (OUTPATIENT)
Dept: FAMILY MEDICINE CLINIC | Facility: CLINIC | Age: 86
End: 2021-06-23

## 2021-06-23 VITALS
SYSTOLIC BLOOD PRESSURE: 130 MMHG | TEMPERATURE: 97.8 F | OXYGEN SATURATION: 97 % | DIASTOLIC BLOOD PRESSURE: 78 MMHG | RESPIRATION RATE: 18 BRPM | WEIGHT: 134 LBS | HEIGHT: 63 IN | HEART RATE: 89 BPM | BODY MASS INDEX: 23.74 KG/M2

## 2021-06-23 DIAGNOSIS — Z79.01 ANTICOAGULATED: ICD-10-CM

## 2021-06-23 DIAGNOSIS — I10 ESSENTIAL HYPERTENSION: Chronic | ICD-10-CM

## 2021-06-23 DIAGNOSIS — Z12.31 ENCOUNTER FOR SCREENING MAMMOGRAM FOR BREAST CANCER: ICD-10-CM

## 2021-06-23 DIAGNOSIS — L21.9 SEBORRHEA: ICD-10-CM

## 2021-06-23 DIAGNOSIS — R51.9 NONINTRACTABLE HEADACHE, UNSPECIFIED CHRONICITY PATTERN, UNSPECIFIED HEADACHE TYPE: ICD-10-CM

## 2021-06-23 DIAGNOSIS — Z23 NEED FOR TDAP VACCINATION: ICD-10-CM

## 2021-06-23 DIAGNOSIS — Z00.00 WELLNESS EXAMINATION: ICD-10-CM

## 2021-06-23 DIAGNOSIS — Z86.2 HISTORY OF ANEMIA: ICD-10-CM

## 2021-06-23 DIAGNOSIS — I50.9 CONGESTIVE HEART FAILURE, UNSPECIFIED HF CHRONICITY, UNSPECIFIED HEART FAILURE TYPE (HCC): ICD-10-CM

## 2021-06-23 PROCEDURE — 1159F MED LIST DOCD IN RCRD: CPT | Performed by: FAMILY MEDICINE

## 2021-06-23 PROCEDURE — 1126F AMNT PAIN NOTED NONE PRSNT: CPT | Performed by: FAMILY MEDICINE

## 2021-06-23 PROCEDURE — 1170F FXNL STATUS ASSESSED: CPT | Performed by: FAMILY MEDICINE

## 2021-06-23 PROCEDURE — 90715 TDAP VACCINE 7 YRS/> IM: CPT | Performed by: FAMILY MEDICINE

## 2021-06-23 PROCEDURE — 99213 OFFICE O/P EST LOW 20 MIN: CPT | Performed by: FAMILY MEDICINE

## 2021-06-23 NOTE — PROGRESS NOTES
Subjective   Hillary Overton is a 85 y.o. female presenting with chief complaint of:   Chief Complaint   Patient presents with   • Medicare Wellness-subsequent     1yr follow up        History of Present Illness :  Alone.       Has multiple chronic problems to consider that might have a bearing on today's issues;  an interval appointment.       Chronic/acute problems reviewed today:   1. Essential hypertension: Chronic/stable. Stable here past/no recent home blood pressures.  No significant chest pain, SOB, LE edema, orthopnea, near syncope, dizziness/light headness.   Recent Vitals       5/10/2018 6/23/2020 6/23/2021       BP:  138/68  158/82  130/78     Pulse:  84  78  89     Temp:  97.8 °F (36.6 °C)  98.6 °F (37 °C)  97.8 °F (36.6 °C)     Weight:  57.2 kg (126 lb)  60.8 kg (134 lb)  60.8 kg (134 lb)     BMI (Calculated):  22.5  23.9  23.9            2. Congestive heart failure, unspecified HF chronicity, unspecified heart failure type: Chronic/stable.  Denies significant sob, orthopnea, leg edema, weight gain.  Aware of influence diet/salt and watching weight at home.       3. Seborrhea: chronic/variable itch/rash eyebrows, ear canals, nasal folds; derm gave kenalog 0.1% oointment and would like refill   4. Anticoagulated prevention/ASA 81: Chronic/stable reason and use of.  Denies bleeding issues; especially epistaxis, melena, hematochezia.  Upper arms/others do not bruise easily.  No significant bleeding or falls.      5. History of anemia: Chronic or past history/stable sometime: This has been present before.    There has been GI evaulation in the past. There is no current melena, hematochezia. It has been benign to date and stable/watching.  Contributing comorbidities to date: benign.     6. Nonintractable headache, unspecified chronicity pattern, unspecified headache type: chronic/much improved from past.  No UE/LE weakness, speech, extremity changes.    7. Wellness examination-done: Chronic ongoing over time  screening or advice for general health care/wellness.      8. Encounter for screening mammogram for breast cancer: Chronic/ongoing yearly need to review for breast cancer.  No breast pain, masses, discharge.       9. Need for Tdap vaccination: Chronic/variable:  Ongoing need to review vaccine status, diagnosis, age.   Considering all needs: tetanus, zoster       Has an/another acute issue today: none.    The following portions of the patient's history were reviewed and updated as appropriate: allergies, current medications, past family history, past medical history, past social history, past surgical history and problem list.      Current Outpatient Medications:   •  aspirin 81 MG EC tablet, Take 1 tablet by mouth Daily., Disp: , Rfl:   •  B Complex Vitamins (B COMPLEX PO), Take 1 tablet by mouth Daily., Disp: , Rfl:   •  BIOTIN 5000 PO, Take 1 capsule by mouth Daily., Disp: , Rfl:   •  Calcium Carb-Cholecalciferol (CALCIUM-VITAMIN D) 600-400 MG-UNIT tablet, Take 1 tablet by mouth Daily., Disp: , Rfl:   •  denosumab (PROLIA) 60 MG/ML solution prefilled syringe syringe, Inject 1 mL under the skin into the appropriate area as directed Every 6 (Six) Months., Disp: 1.8 mL, Rfl: 1  •  losartan (COZAAR) 50 MG tablet, TAKE 1 TABLET BY MOUTH DAILY, Disp: 90 tablet, Rfl: 3  •  Multiple Vitamins-Minerals (CENTRUM SILVER ULTRA WOMENS) tablet, Take 1 tablet by mouth Daily., Disp: , Rfl:   •  Omega-3 Fatty Acids (FISH OIL) 1000 MG capsule capsule, Take 1,000 mg by mouth Daily With Breakfast., Disp: , Rfl:   •  vitamin B-12 (CYANOCOBALAMIN) 100 MCG tablet, Take 50 mcg by mouth., Disp: , Rfl:   •  triamcinolone (KENALOG) 0.1 % ointment, Apply  topically to the appropriate area as directed 2 (Two) Times a Day As Needed for Irritation., Disp: 15 g, Rfl: 1    No problems with medications.none    No Known Allergies    Review of Systems  GENERAL:  Active/slower with limits, speed, stamina for age. Sleep is ok. No fever now.  SKIN: No  rash/skin lesion of concern beyond above.   ENDO:  No syncope, near or diaphoretic sweaty spells.  HEENT: No recent head injury; or headache.   No vision change.  No significant hearing loss.  Ears without pain/drainage.  No sore throat.  No significant nasal/sinus congestion/drainage. No epistaxis.  CHEST: No chest wall tenderness or mass. No cough, without wheeze.  No SOB; no hemoptysis.  CV: No chest pain, palpitations, ankle edema.  GI: No heartburn, dysphagia.  No abdominal pain, diarrhea, constipation.  No rectal bleeding, or melena.    :  Voids without dysuria, or incontinence to completion.  ORTHO: No painful/swollen joints but various on /off sore.  No sore neck or back.  No acute neck or back pain without recent injury.  NEURO: No dizziness, weakness of extremities.  No numbness/paresthesias.   PSYCH: No memory loss.  Mood good; not anxious, depressed but/and not suicidal.  Tries to tolerate stress .   Screening:  Mammogram: 6.4.20  Bone density: 7.24.20 bone d-deca/[facility] -2.6 hip -1.7/7.24.20; qualifies for [facility]  GI:   EGD-Inflammatory change of bulb/[facility]//[facility]/08-24-05  H pylori/[facility]/8-24-05  Colonoscopy+neg/[facility]//11-17-10/10y  Prostate: NA  Usual lab order  12m CBC, CMP, LIPID, TSH, Vit D, iron, B12, folate    Data reviewed:     Lab Results:  Results for orders placed or performed in visit on 06/21/21   Comprehensive Metabolic Panel    Specimen: Blood   Result Value Ref Range    Glucose 96 65 - 99 mg/dL    BUN 15 8 - 23 mg/dL    Creatinine 0.76 0.57 - 1.00 mg/dL    eGFR Non African Am 73 >60 mL/min/1.73    eGFR African Am 88 >60 mL/min/1.73    BUN/Creatinine Ratio 19.7 7.0 - 25.0    Sodium 140 136 - 145 mmol/L    Potassium 4.6 3.5 - 5.2 mmol/L    Chloride 104 98 - 107 mmol/L    Total CO2 26.5 22.0 - 29.0 mmol/L    Calcium 9.3 8.6 - 10.5 mg/dL    Total Protein 6.4 6.0 - 8.5 g/dL    Albumin 4.30 3.50 - 5.20 g/dL    Globulin 2.1 gm/dL    A/G Ratio 2.0 g/dL    Total Bilirubin 0.5 0.0 - 1.2 mg/dL     Alkaline Phosphatase 91 39 - 117 U/L    AST (SGOT) 31 1 - 32 U/L    ALT (SGPT) 16 1 - 33 U/L   Lipid Panel    Specimen: Blood   Result Value Ref Range    Total Cholesterol 154 0 - 200 mg/dL    Triglycerides 100 0 - 150 mg/dL    HDL Cholesterol 60 40 - 60 mg/dL    VLDL Cholesterol Bruce 18 5 - 40 mg/dL    LDL Chol Calc (NIH) 76 0 - 100 mg/dL   TSH    Specimen: Blood   Result Value Ref Range    TSH 1.590 0.270 - 4.200 uIU/mL   Vitamin D 25 Hydroxy    Specimen: Blood   Result Value Ref Range    25 Hydroxy, Vitamin D 64.5 30.0 - 100.0 ng/ml   Iron    Specimen: Blood   Result Value Ref Range    Iron 85 37 - 145 mcg/dL   Vitamin B12    Specimen: Blood   Result Value Ref Range    Vitamin B-12 1,145 (H) 211 - 946 pg/mL   Folate    Specimen: Blood   Result Value Ref Range    Folate 18.40 4.78 - 24.20 ng/mL   CBC & Differential    Specimen: Blood   Result Value Ref Range    WBC 7.90 3.40 - 10.80 10*3/mm3    RBC 4.84 3.77 - 5.28 10*6/mm3    Hemoglobin 15.0 12.0 - 15.9 g/dL    Hematocrit 44.6 34.0 - 46.6 %    MCV 92.1 79.0 - 97.0 fL    MCH 31.0 26.6 - 33.0 pg    MCHC 33.6 31.5 - 35.7 g/dL    RDW 13.1 12.3 - 15.4 %    Platelets 342 140 - 450 10*3/mm3    Neutrophil Rel % 47.1 42.7 - 76.0 %    Lymphocyte Rel % 29.4 19.6 - 45.3 %    Monocyte Rel % 8.9 5.0 - 12.0 %    Eosinophil Rel % 12.8 (H) 0.3 - 6.2 %    Basophil Rel % 1.5 0.0 - 1.5 %    Neutrophils Absolute 3.73 1.70 - 7.00 10*3/mm3    Lymphocytes Absolute 2.32 0.70 - 3.10 10*3/mm3    Monocytes Absolute 0.70 0.10 - 0.90 10*3/mm3    Eosinophils Absolute 1.01 (H) 0.00 - 0.40 10*3/mm3    Basophils Absolute 0.12 0.00 - 0.20 10*3/mm3    Immature Granulocyte Rel % 0.3 0.0 - 0.5 %    Immature Grans Absolute 0.02 0.00 - 0.05 10*3/mm3    nRBC 0.1 0.0 - 0.2 /100 WBC       A1C:No results for input(s): HGBA1C in the last 82190 hours.  LIPID:  Lab Results - Last 18 Months   Lab Units 06/21/21  0704 06/16/20  0810   CHOLESTEROL mg/dL 154 176   LDL CHOL mg/dL 76 89   HDL CHOL mg/dL 60 63*  "  TRIGLYCERIDES mg/dL 100 119     PSA:No results for input(s): PSA in the last 57996 hours.  CBC:  Lab Results - Last 18 Months   Lab Units 06/21/21  0704 06/16/20  0810   WBC 10*3/mm3 7.90 6.31   HEMOGLOBIN g/dL 15.0 14.5   HEMATOCRIT % 44.6 43.8   PLATELETS 10*3/mm3 342 345   IRON mcg/dL 85 120      BMP/CMP:  Lab Results - Last 18 Months   Lab Units 06/21/21  0704 06/16/20  0810   SODIUM mmol/L 140 140   POTASSIUM mmol/L 4.6 4.6   CHLORIDE mmol/L 104 103   TOTAL CO2 mmol/L 26.5 27.6   GLUCOSE mg/dL 96 99   BUN mg/dL 15 17   CREATININE mg/dL 0.76 0.86   EGFR IF NONAFRICN AM mL/min/1.73 73 63   EGFR IF AFRICN AM mL/min/1.73 88 77   CALCIUM mg/dL 9.3 9.8     HEPATIC:  Lab Results - Last 18 Months   Lab Units 06/21/21  0704 06/16/20  0810   ALT (SGPT) U/L 16 17   AST (SGOT) U/L 31 29   ALK PHOS U/L 91 120*     THYROID:  Lab Results - Last 18 Months   Lab Units 06/21/21  0704 06/16/20  0810   TSH uIU/mL 1.590 1.640       Objective   /78   Pulse 89   Temp 97.8 °F (36.6 °C) (Infrared)   Resp 18   Ht 159.4 cm (62.75\")   Wt 60.8 kg (134 lb)   SpO2 97%   BMI 23.93 kg/m²   Body mass index is 23.93 kg/m².    Recent Vitals       5/10/2018 6/23/2020 6/23/2021       BP:  138/68  158/82  130/78     Pulse:  84  78  89     Temp:  97.8 °F (36.6 °C)  98.6 °F (37 °C)  97.8 °F (36.6 °C)     Weight:  57.2 kg (126 lb)  60.8 kg (134 lb)  60.8 kg (134 lb)     BMI (Calculated):  22.5  23.9  23.9           Physical Exam  GENERAL:  Well nourished/developed in no acute distress.   SKIN: Turgor excellent, without wound, rash, lesion  HEENT: Normal cephalic without trauma.  Pupils equal round reactive to light. Extraocular motions full without nystagmus.   External canals nonobstructive nontender without reddness. Tymphatic membranes cleve with edmund structures intact.   Oral cavity without growths, exudates, and moist.  Posterior pharynx without mass, obstruction, redness.  No thyromegaly, mass, tenderness, lymphadenopathy and " supple.  CV: Regular rhythm.  No murmur, gallop,  edema. Posterior pulses intact.  No carotid bruits.  CHEST: No chest wall tenderness or mass.   LUNGS: Symmetric motion with clear to auscultation.    ABD: Soft, nontender without mass.   ORTHO: Symmetric extremities without swelling/point tenderness.  Full gross range of motion.  NEURO: CN 2-12 grossly intact.  Symmetric facies and UE/LE. 3/5 strength throughout. 1/4 x bicep  equal reflexes.  Nonfocal use extremities. Speech clear. Intact light touch with monofilament, vibratory sensation with tuning fork; equal toes/distal feet.    PSYCH: Oriented x 3.  Pleasant calm, well kept.  Purposeful/directed conservation with intact short/long gross memory.        Assessment/Plan     1. Essential hypertension    2. Congestive heart failure, unspecified HF chronicity, unspecified heart failure type (CMS/HCC)    3. Seborrhea    4. Anticoagulated prevention/ASA 81    5. History of anemia    6. Nonintractable headache, unspecified chronicity pattern, unspecified headache type    7. Wellness examination-done    8. Encounter for screening mammogram for breast cancer    9. Need for Tdap vaccination      Discussion:  Wellness; especially vaccines and screening  Refill kenalog for seborrhea  Advised colonoscopy    Medical decision issues:   Data review above:   Rx: reviewed and decisions:     New Medications Ordered This Visit   Medications   • triamcinolone (KENALOG) 0.1 % ointment     Sig: Apply  topically to the appropriate area as directed 2 (Two) Times a Day As Needed for Irritation.     Dispense:  15 g     Refill:  1       Orders placed:   LAB/Testing/Referrals: reviewed/orders:   Today:   Orders Placed This Encounter   Procedures   • Mammo Screening Digital Tomosynthesis Bilateral With CAD   • Tdap Vaccine Greater Than or Equal To 6yo IM     Chronic/recurrent labs above or change to:   same     Personal interpretation of tests/procedures:     Health maintenance:   Body mass  "index is 23.93 kg/m².  Patient's Body mass index is 23.93 kg/m². indicating that she is within normal range (BMI 18.5-24.9). No BMI management plan needed..    Tobacco use reviewed:    Hillary Overton  reports that she quit smoking about 31 years ago. She started smoking about 65 years ago. She smoked 0.50 packs per day. She has never used smokeless tobacco..    Annual/wellness visit: also/today (see separate note)    Patient Instructions     Medicare/insurances offer certain visits called \"wellness/annual\" that allows for time to deal with and  review the many aspects of \"being well\" that just might not get mentioned during other visits with your doctor through the year.  This includes things like reviews of health screenings (mammograms, various labs),  weight, exercise, vaccines for just a few examples.      In order to help you with this we wish to make you aware of a few things for you to consider:    1. Advanced directives.  These are documents used to help direct your care if your health/situation should reach a point that you cannot make your own decisions.  While it is likely you do not currently have a need for these documents now; it is something that we all might face at any time.   The hand outs you are being given today are simply for you to review and use to learn more about these documents and consider them as you wish.      2. Vaccines: Certain vaccines are important after age 50, 60, and 65 and some health situations (for example COPD), require even boosters beyond age 65.  We are happy to review with you your vaccine status and vaccines that might be needed for you at this point:      a. Tetanus.   Like anyone this needs to given every 10 years; sooner for/with lacerations/wounds.   Likely when getting this booster it needs to be a tetanus called Tdap (tetanus mixed with diptheria and pertussis).   Years ago you had this vaccine.  We now know we can lose our immunity to pertussis (a part of this " vaccine) and run a risk of catching this.  Now only would this make us ill; but more importantly we can spread this to very young children (and for them it can be a much more dangerous illness).   We call this the grandparent vaccine for this reason.     b. Pneumonia (strept).   This comes now with two brands.   It is recommended to take pneumovax first; and a year later take the cousin prevnar.  Even if you have had these before; we need to review when and your current health situation/s as you may need boosters and even recently the CDC has made recent/new recommendations for pneumovax.      c. Shingles.  You do not want to catch shingles.  Though you will recover from this; the pain associated with shingles can be severe.  Even if you have had the now older zostavax, or have had shingles; it is recommended you still get the Shingrix (the new vaccine just available early 2018 shingles vaccine).  A new shingles vaccine (a shot to lower your chance of catching shingles) is now available (shingrix).  This vaccine is the second vaccine created for this purpose; (we have had zostavax for years).  Shingrix provides a much better and longer immunity for shingles than zostavax.  For this and other reasons Shingrix can be started at age 50.  If you have had zostavax in the past; you can still take Shingrix.      This vaccine is not paid for in a doctor's office by medicare, medicaid and probably most insurances.  Like zostavax; this is covered in drug stores.  This is a vaccine that if you chose to get you need to get at a drug store that gives vaccines (like [facility], [facility] and [facility].      d. Yearly flu vaccine given from September through April each year (there is a special vaccine for those over 65).     e. Travel vaccines:  If you are one to do international travel; be sure and ask us for any particular unusual vaccines you may need.     f.  Miscellaneous:  If you have certain health  situations/disease you may need specific/particular vaccines not give to the general public.     The vaccines we have on record for you include:   Immunization History   Administered Date(s) Administered   • Flu Vaccine Quad PF >18YRS 11/23/2016, 10/31/2017   • Pneumococcal Conjugate 13-Valent (PCV13) 10/21/2015   • Pneumococcal, Unspecified 10/09/2009       If you have record of other vaccines and want them to show in your chart here; please talk to our nurses about having your vaccine record updated.     3. Exercise: regular cardio exercise something everyone should consider and try to do; even if health limitations (ie find that exercise UE/LE/cardio that they can tolerate).   Normal weight a goal for everyone (as we discussed)    4. Healthy diet helpful for weight management, illness prevention.     5. If over 50-screening exams include men PSA/rectal exam, women mammograms, and everyone colonoscopy screening for colon cancer.    6. If you use tobacco of any kind or e-products you should stop. We are providing you some information to consider that could make this process easier.      ##################################              Follow up: Return for lab;, Dr Wright-, 12m;.  Future Appointments   Date Time Provider Department Center   6/21/2022  8:30 AM LAB [facility]   6/23/2022  1:00 PM Victor Manuel Wright MD [facility]

## 2021-06-23 NOTE — PATIENT INSTRUCTIONS
"Medicare/insurances offer certain visits called \"wellness/annual\" that allows for time to deal with and  review the many aspects of \"being well\" that just might not get mentioned during other visits with your doctor through the year.  This includes things like reviews of health screenings (mammograms, various labs),  weight, exercise, vaccines for just a few examples.      In order to help you with this we wish to make you aware of a few things for you to consider:    1. Advanced directives.  These are documents used to help direct your care if your health/situation should reach a point that you cannot make your own decisions.  While it is likely you do not currently have a need for these documents now; it is something that we all might face at any time.   The hand outs you are being given today are simply for you to review and use to learn more about these documents and consider them as you wish.      2. Vaccines: Certain vaccines are important after age 50, 60, and 65 and some health situations (for example COPD), require even boosters beyond age 65.  We are happy to review with you your vaccine status and vaccines that might be needed for you at this point:      a. Tetanus.   Like anyone this needs to given every 10 years; sooner for/with lacerations/wounds.   Likely when getting this booster it needs to be a tetanus called Tdap (tetanus mixed with diptheria and pertussis).   Years ago you had this vaccine.  We now know we can lose our immunity to pertussis (a part of this vaccine) and run a risk of catching this.  Now only would this make us ill; but more importantly we can spread this to very young children (and for them it can be a much more dangerous illness).   We call this the grandparent vaccine for this reason.     b. Pneumonia (strept).   This comes now with two brands.   It is recommended to take pneumovax first; and a year later take the cousin prevnar.  Even if you have had these before; we need to " review when and your current health situation/s as you may need boosters and even recently the CDC has made recent/new recommendations for pneumovax.      c. Shingles.  You do not want to catch shingles.  Though you will recover from this; the pain associated with shingles can be severe.  Even if you have had the now older zostavax, or have had shingles; it is recommended you still get the Shingrix (the new vaccine just available early 2018 shingles vaccine).  A new shingles vaccine (a shot to lower your chance of catching shingles) is now available (shingrix).  This vaccine is the second vaccine created for this purpose; (we have had zostavax for years).  Shingrix provides a much better and longer immunity for shingles than zostavax.  For this and other reasons Shingrix can be started at age 50.  If you have had zostavax in the past; you can still take Shingrix.      This vaccine is not paid for in a doctor's office by medicare, medicaid and probably most insurances.  Like zostavax; this is covered in drug stores.  This is a vaccine that if you chose to get you need to get at a drug store that gives vaccines (like [facility], [facility] and [facility].      d. Yearly flu vaccine given from September through April each year (there is a special vaccine for those over 65).     e. Travel vaccines:  If you are one to do international travel; be sure and ask us for any particular unusual vaccines you may need.     f.  Miscellaneous:  If you have certain health situations/disease you may need specific/particular vaccines not give to the general public.     The vaccines we have on record for you include:   Immunization History   Administered Date(s) Administered   • Flu Vaccine Quad PF >18YRS 11/23/2016, 10/31/2017   • Pneumococcal Conjugate 13-Valent (PCV13) 10/21/2015   • Pneumococcal, Unspecified 10/09/2009       If you have record of other vaccines and want them to show in your chart here; please talk to  our nurses about having your vaccine record updated.     3. Exercise: regular cardio exercise something everyone should consider and try to do; even if health limitations (ie find that exercise UE/LE/cardio that they can tolerate).   Normal weight a goal for everyone (as we discussed)    4. Healthy diet helpful for weight management, illness prevention.     5. If over 50-screening exams include men PSA/rectal exam, women mammograms, and everyone colonoscopy screening for colon cancer.    6. If you use tobacco of any kind or e-products you should stop. We are providing you some information to consider that could make this process easier.      ##################################

## 2021-06-24 NOTE — PROGRESS NOTES
The ABCs of the Annual Wellness Visit  Subsequent Medicare Wellness Visit    Chief Complaint   Patient presents with   • Medicare Wellness-subsequent     1yr follow up        Subjective   History of Present Illness:  Hillary Overton is a 85 y.o. female who presents for a Subsequent Medicare Wellness Visit.    HEALTH RISK ASSESSMENT    Recent Hospitalizations:  No hospitalization(s) within the last year.    Current Medical Providers:  Patient Care Team:  Victor Manuel Wright MD as PCP - General    Smoking Status:  Social History     Tobacco Use   Smoking Status Former Smoker   • Packs/day: 0.50   • Start date: 1955   • Quit date: 1989   • Years since quittin.6   Smokeless Tobacco Never Used       Alcohol Consumption:  Social History     Substance and Sexual Activity   Alcohol Use No       Depression Screen:   PHQ-2/PHQ-9 Depression Screening 2021   Little interest or pleasure in doing things 0   Feeling down, depressed, or hopeless 0   Trouble falling or staying asleep, or sleeping too much 0   Feeling tired or having little energy 0   Poor appetite or overeating 0   Feeling bad about yourself - or that you are a failure or have let yourself or your family down 0   Trouble concentrating on things, such as reading the newspaper or watching television 0   Moving or speaking so slowly that other people could have noticed. Or the opposite - being so fidgety or restless that you have been moving around a lot more than usual 0   Thoughts that you would be better off dead, or of hurting yourself in some way 0   Total Score 0   If you checked off any problems, how difficult have these problems made it for you to do your work, take care of things at home, or get along with other people? Not difficult at all       Fall Risk Screen:  STEADI Fall Risk Assessment was completed, and patient is at MODERATE risk for falls. Assessment completed on:2021    Health Habits and Functional and Cognitive  Screening:  Functional & Cognitive Status 6/23/2021   Do you have difficulty preparing food and eating? No   Do you have difficulty bathing yourself, getting dressed or grooming yourself? No   Do you have difficulty using the toilet? No   Do you have difficulty moving around from place to place? No   Do you have trouble with steps or getting out of a bed or a chair? No   Current Diet Well Balanced Diet   Dental Exam Up to date   Eye Exam Up to date   Exercise (times per week) 0 times per week   Current Exercises Include No Regular Exercise   Do you need help using the phone?  No   Are you deaf or do you have serious difficulty hearing?  No   Do you need help with transportation? No   Do you need help shopping? No   Do you need help preparing meals?  No   Do you need help with housework?  No   Do you need help with laundry? No   Do you need help taking your medications? No   Do you need help managing money? No   Do you ever drive or ride in a car without wearing a seat belt? No   Have you felt unusual stress, anger or loneliness in the last month? No   Who do you live with? Alone   If you need help, do you have trouble finding someone available to you? No   Have you been bothered in the last four weeks by sexual problems? No   Do you have difficulty concentrating, remembering or making decisions? No         Does the patient have evidence of cognitive impairment? No    Asprin use counseling:Does not need ASA (and currently is not on it)    Age-appropriate Screening Schedule:  Refer to the list below for future screening recommendations based on patient's age, sex and/or medical conditions. Orders for these recommended tests are listed in the plan section. The patient has been provided with a written plan.    Health Maintenance   Topic Date Due   • ZOSTER VACCINE (1 of 2) Never done   • INFLUENZA VACCINE  08/01/2021   • DXA SCAN  07/23/2022   • TDAP/TD VACCINES (2 - Td or Tdap) 06/23/2031          The following  portions of the patient's history were reviewed and updated as appropriate: allergies, current medications, past family history, past medical history, past social history, past surgical history and problem list.    Outpatient Medications Prior to Visit   Medication Sig Dispense Refill   • aspirin 81 MG EC tablet Take 1 tablet by mouth Daily.     • B Complex Vitamins (B COMPLEX PO) Take 1 tablet by mouth Daily.     • BIOTIN 5000 PO Take 1 capsule by mouth Daily.     • Calcium Carb-Cholecalciferol (CALCIUM-VITAMIN D) 600-400 MG-UNIT tablet Take 1 tablet by mouth Daily.     • denosumab (PROLIA) 60 MG/ML solution prefilled syringe syringe Inject 1 mL under the skin into the appropriate area as directed Every 6 (Six) Months. 1.8 mL 1   • losartan (COZAAR) 50 MG tablet TAKE 1 TABLET BY MOUTH DAILY 90 tablet 3   • Multiple Vitamins-Minerals (CENTRUM SILVER ULTRA WOMENS) tablet Take 1 tablet by mouth Daily.     • Omega-3 Fatty Acids (FISH OIL) 1000 MG capsule capsule Take 1,000 mg by mouth Daily With Breakfast.     • vitamin B-12 (CYANOCOBALAMIN) 100 MCG tablet Take 50 mcg by mouth.       No facility-administered medications prior to visit.       Patient Active Problem List   Diagnosis   • Wellness examination-done   • Concussion   • Congestive heart failure-diastolic   • Hypertension   • Fibrocystic breast disease   • Surgical menopause-see osteoporosis   • Osteoporosis 2020-prolia/2y   • History of anemia   • Nodule of neck-R submandibular   • Headache   • Anticoagulated prevention/ASA 81   • Laboratory test*   • Seborrhea       Advanced Care Planning:  ACP discussion was held with the patient during this visit. Patient has an advance directive (not in EMR), copy requested.    Review of Systems  GENERAL:  Active/slower with limits, speed, stamina for age. Sleep is ok. No fever now.  SKIN: No rash/skin lesion of concern beyond above.   ENDO:  No syncope, near or diaphoretic sweaty spells.  HEENT: No recent head injury; or  "headache.   No vision change.  No significant hearing loss.  Ears without pain/drainage.  No sore throat.  No significant nasal/sinus congestion/drainage. No epistaxis.  CHEST: No chest wall tenderness or mass. No cough, without wheeze.  No SOB; no hemoptysis.  CV: No chest pain, palpitations, ankle edema.  GI: No heartburn, dysphagia.  No abdominal pain, diarrhea, constipation.  No rectal bleeding, or melena.    :  Voids without dysuria, or incontinence to completion.  ORTHO: No painful/swollen joints but various on /off sore.  No sore neck or back.  No acute neck or back pain without recent injury.  NEURO: No dizziness, weakness of extremities.  No numbness/paresthesias.   PSYCH: No memory loss.  Mood good; not anxious, depressed but/and not suicidal.  Tries to tolerate stress .   Screening:  Mammogram: 6.4.20  Bone density: 7.24.20 bone d-deca/[facility]/LS -2.6 hip -1.7/7.24.20; qualifies for boniva  GI:   EGD-Inflammatory change of bulb/[facility]//[facility]/08-24-05  H pylori/[facility]/8-24-05  Colonoscopy+neg/[facility]//11-17-10/10y  Prostate: NA  Usual lab order  12m CBC, CMP, LIPID, TSH, Vit D, iron, B12, folate    Compared to one year ago, the patient feels her physical health is the same.  Compared to one year ago, the patient feels her mental health is the same.    Reviewed chart for potential of high risk medication in the elderly: yes  Reviewed chart for potential of harmful drug interactions in the elderly:yes    Objective         Vitals:    06/23/21 1257   BP: 130/78   Pulse: 89   Resp: 18   Temp: 97.8 °F (36.6 °C)   TempSrc: Infrared   SpO2: 97%   Weight: 60.8 kg (134 lb)   Height: 159.4 cm (62.75\")   PainSc: 0-No pain       Body mass index is 23.93 kg/m².  Discussed the patient's BMI with her. The BMI is in the acceptable range.    Physical Exam  GENERAL:  Well nourished/developed in no acute distress.   SKIN: Turgor excellent, without wound, rash, lesion  HEENT: Normal cephalic without trauma.  Pupils equal round " reactive to light. Extraocular motions full without nystagmus.   External canals nonobstructive nontender without reddness. Tymphatic membranes cleve with edmund structures intact.   Oral cavity without growths, exudates, and moist.  Posterior pharynx without mass, obstruction, redness.  No thyromegaly, mass, tenderness, lymphadenopathy and supple.  CV: Regular rhythm.  No murmur, gallop,  edema. Posterior pulses intact.  No carotid bruits.  CHEST: No chest wall tenderness or mass.   LUNGS: Symmetric motion with clear to auscultation.    ABD: Soft, nontender without mass.   ORTHO: Symmetric extremities without swelling/point tenderness.  Full gross range of motion.  NEURO: CN 2-12 grossly intact.  Symmetric facies and UE/LE. 3/5 strength throughout. 1/4 x bicep  equal reflexes.  Nonfocal use extremities. Speech clear. Intact light touch with monofilament, vibratory sensation with tuning fork; equal toes/distal feet.    PSYCH: Oriented x 3.  Pleasant calm, well kept.  Purposeful/directed conservation with intact short/long gross memory.        Lab Results   Component Value Date    GLU 96 06/21/2021    CHLPL 154 06/21/2021    TRIG 100 06/21/2021    HDL 60 06/21/2021    LDL 76 06/21/2021    VLDL 18 06/21/2021        Assessment/Plan   Medicare Risks and Personalized Health Plan  CMS Preventative Services Quick Reference  Advance Directive Discussion  Breast Cancer/Mammogram Screening  Colon Cancer Screening  Immunizations Discussed/Encouraged (specific immunizations; Tdap, Influenza, Pneumococcal 23, Shingrix and COVID19 )  Osteoporosis Risk    The above risks/problems have been discussed with the patient.  Pertinent information has been shared with the patient in the After Visit Summary.  Follow up plans and orders are seen below in the Assessment/Plan Section.    Diagnoses and all orders for this visit:    1. Essential hypertension    2. Congestive heart failure, unspecified HF chronicity, unspecified heart failure  type (CMS/HCC)    3. Seborrhea    4. Anticoagulated prevention/ASA 81    5. History of anemia    6. Nonintractable headache, unspecified chronicity pattern, unspecified headache type    7. Wellness examination-done    8. Encounter for screening mammogram for breast cancer  -     Mammo Screening Digital Tomosynthesis Bilateral With CAD; Future    9. Need for Tdap vaccination  -     Tdap Vaccine Greater Than or Equal To 6yo IM    Other orders  -     triamcinolone (KENALOG) 0.1 % ointment; Apply  topically to the appropriate area as directed 2 (Two) Times a Day As Needed for Irritation.  Dispense: 15 g; Refill: 1      Follow Up:  Return for lab;, Dr Wright-, 12m;.     An After Visit Summary and PPPS were given to the patient.

## 2021-07-06 DIAGNOSIS — M81.0 OSTEOPOROSIS, UNSPECIFIED OSTEOPOROSIS TYPE, UNSPECIFIED PATHOLOGICAL FRACTURE PRESENCE: ICD-10-CM

## 2021-07-06 RX ORDER — LOSARTAN POTASSIUM 50 MG/1
50 TABLET ORAL DAILY
Qty: 90 TABLET | Refills: 3 | Status: SHIPPED | OUTPATIENT
Start: 2021-07-06 | End: 2022-09-22

## 2021-07-06 RX ORDER — DENOSUMAB 60 MG/ML
INJECTION SUBCUTANEOUS
Qty: 1 ML | Refills: 0 | Status: SHIPPED | OUTPATIENT
Start: 2021-07-06 | End: 2021-09-08 | Stop reason: SDUPTHER

## 2021-07-06 NOTE — TELEPHONE ENCOUNTER
Requested Prescriptions     Pending Prescriptions Disp Refills   • Prolia 60 MG/ML solution prefilled syringe syringe [Pharmacy Med Name: PROLIA PFS 60MG/ML] 1 mL 0     Sig: INJECT 60MG SUBCUTANEOUSLY  EVERY 6 MONTHS (GIVEN AT MD OFFICE)

## 2021-09-02 ENCOUNTER — TELEPHONE (OUTPATIENT)
Dept: FAMILY MEDICINE CLINIC | Facility: CLINIC | Age: 86
End: 2021-09-02

## 2021-09-02 NOTE — TELEPHONE ENCOUNTER
PT CALLED TO CHECK AND SEE WHETHER HER PROLIA SHOT HAD BEEN RECEIVED AND WAS READY FOR HER TO COME TAKE IT    CALLBACK [phone]

## 2021-09-08 DIAGNOSIS — M81.0 OSTEOPOROSIS, UNSPECIFIED OSTEOPOROSIS TYPE, UNSPECIFIED PATHOLOGICAL FRACTURE PRESENCE: ICD-10-CM

## 2021-09-08 RX ORDER — DENOSUMAB 60 MG/ML
60 INJECTION SUBCUTANEOUS
Qty: 1 ML | Refills: 1 | Status: SHIPPED | OUTPATIENT
Start: 2021-09-08 | End: 2022-12-27

## 2021-09-08 NOTE — TELEPHONE ENCOUNTER
Patient called for her prolia injection, advised will check with [facility] and noted needed a refill and sent in and called to set up delivery    Pt advised they took my credit card number already,     Called [facility] and will send out on Friday, called pt to advise and apt scheduled

## 2021-09-14 ENCOUNTER — CLINICAL SUPPORT (OUTPATIENT)
Dept: FAMILY MEDICINE CLINIC | Facility: CLINIC | Age: 86
End: 2021-09-14

## 2021-09-14 DIAGNOSIS — M81.0 AGE RELATED OSTEOPOROSIS, UNSPECIFIED PATHOLOGICAL FRACTURE PRESENCE: Primary | ICD-10-CM

## 2021-09-14 PROCEDURE — 96372 THER/PROPH/DIAG INJ SC/IM: CPT | Performed by: FAMILY MEDICINE

## 2021-09-14 NOTE — PROGRESS NOTES
Pt here for prolia injection as ordered by provider. Given in L upper arm and pt tolerated well. PT supplied medication.

## 2021-09-28 ENCOUNTER — TELEPHONE (OUTPATIENT)
Dept: FAMILY MEDICINE CLINIC | Facility: CLINIC | Age: 86
End: 2021-09-28

## 2021-09-28 NOTE — TELEPHONE ENCOUNTER
Caller: Hillary Overton    Relationship to patient: Self    Best call back number: [phone]    Date of exposure: Sunday, 9/26/21          MS JOSEPH AND MS RAUL SHAY WERE BOTH EXPOSED TO COVID AT [employer] [employer] SUN. THE PERSON WHO TESTED POSITIVE DID NOT WEAR A MASK.

## 2021-09-29 NOTE — TELEPHONE ENCOUNTER
First being vaccinated is a big plus; it makes a chance her getting sick enough to be in the hospital and certainly to die low    Most people start showing signs of magalys Covid (after going to) after exposure that transmits disease; anywhere from 2 to 14 days but generally somewhere around 5 to 8 days (the best time to check)    I would suggest she get tested at any point that she develops unexplained body aches headache loss of taste or smell cough sore throat or Covid symptoms    Or    Test around day 7-8 if at that time no symtpoms.     As she could be asymptomatic and have/spread the virus I would avoid unnecessary exposure to others but if has to get out; keep 6f distance and wear her mask.       Spoke to patient and advised yesterday was sent to wrong office and did not resend this message to Dr Wright, pt called and wanted to know why we have not called her yet?    2-25-21 [facility]  3-25-21 [facility]    Advised pt will call her back asap, and sorry that she was not called yesterday

## 2021-10-04 ENCOUNTER — TELEPHONE (OUTPATIENT)
Dept: FAMILY MEDICINE CLINIC | Facility: CLINIC | Age: 86
End: 2021-10-04

## 2021-10-04 DIAGNOSIS — Z20.822 CLOSE EXPOSURE TO COVID-19 VIRUS: Primary | ICD-10-CM

## 2021-10-04 DIAGNOSIS — R51.9 HEADACHE ABOVE THE EYE REGION: ICD-10-CM

## 2021-10-04 DIAGNOSIS — R53.83 OTHER FATIGUE: ICD-10-CM

## 2021-10-04 NOTE — TELEPHONE ENCOUNTER
PT CALLED ASKING FOR EUSEBIA TO CALL HER BACK REGARDING HER COVID EXPOSURE AND SYMPTOMS, STATES DR. HELM HAD INSTRUCTED HER TO CALL TODAY    CALLBACK [phone]

## 2021-10-04 NOTE — TELEPHONE ENCOUNTER
"Pt called to advised \" I feel ok, what do you think., we have 9 positive cases at [facility].Im so sick and nervous    Order done to [facility], and advised pt to go through the drive thru  "

## 2021-11-08 ENCOUNTER — TELEPHONE (OUTPATIENT)
Dept: FAMILY MEDICINE CLINIC | Facility: CLINIC | Age: 86
End: 2021-11-08

## 2021-11-08 NOTE — TELEPHONE ENCOUNTER
Caller: Hillary Overton    Relationship: Self    Best call back number: [phone]     What is the best time to reach you: ANYTIME     Who are you requesting to speak with (clinical staff, provider,  specific staff member): CLINICAL STAFF     Do you know the name of the person who called: HILLARY BENEDICTO     What was the call regarding: PATIENT STATED THAT SHE WAS EXPOSED TO SOMEONE WITH COVID-19 FOR ABOUT 30 MINUTES BUT HAS NOT EXPERIENCED ANY SYMPTOMS AND SHE IS FULLY VACCINATED AND HAS HAD BOOSTER SHOT AND WOULD LIKE TO KNOW IF SHE SHOULD GO GET TESTED FOR COVID-19. PLEASE ADVISE.     Do you require a callback: YES

## 2022-01-05 ENCOUNTER — TELEPHONE (OUTPATIENT)
Dept: FAMILY MEDICINE CLINIC | Facility: CLINIC | Age: 87
End: 2022-01-05

## 2022-01-05 NOTE — TELEPHONE ENCOUNTER
Zestril 10  We have tried to find an alternative for your previous blood pressure/heart medication and come as close as possible to what we feel would be an equal dose.  However, this is a change in medication and deserves a close watching of your blood pressure for 2-3 weeks after the change to be sure the blood pressure numbers remain stable.       Lisinopril, valsarten,  irbesartan?

## 2022-01-05 NOTE — TELEPHONE ENCOUNTER
What does her pharmacy have/can get for her?     Per pharmacies Losartan (all doses)  is on back-order.  Hillary needs a replacement for her 50mg Losartan.

## 2022-01-06 RX ORDER — LISINOPRIL 10 MG/1
10 TABLET ORAL DAILY
Qty: 30 TABLET | Refills: 2 | Status: SHIPPED | OUTPATIENT
Start: 2022-01-06 | End: 2022-02-28

## 2022-02-28 RX ORDER — LISINOPRIL 10 MG/1
10 TABLET ORAL DAILY
Qty: 30 TABLET | Refills: 2 | Status: SHIPPED | OUTPATIENT
Start: 2022-02-28 | End: 2022-06-24

## 2022-03-17 ENCOUNTER — CLINICAL SUPPORT (OUTPATIENT)
Dept: FAMILY MEDICINE CLINIC | Facility: CLINIC | Age: 87
End: 2022-03-17

## 2022-03-17 DIAGNOSIS — M81.0 OSTEOPOROSIS, POST-MENOPAUSAL: Primary | ICD-10-CM

## 2022-03-17 PROCEDURE — 96372 THER/PROPH/DIAG INJ SC/IM: CPT | Performed by: FAMILY MEDICINE

## 2022-06-21 ENCOUNTER — TELEPHONE (OUTPATIENT)
Dept: FAMILY MEDICINE CLINIC | Facility: CLINIC | Age: 87
End: 2022-06-21

## 2022-06-21 ENCOUNTER — LAB (OUTPATIENT)
Dept: FAMILY MEDICINE CLINIC | Facility: CLINIC | Age: 87
End: 2022-06-21

## 2022-06-21 ENCOUNTER — OFFICE VISIT (OUTPATIENT)
Dept: FAMILY MEDICINE CLINIC | Facility: CLINIC | Age: 87
End: 2022-06-21

## 2022-06-21 VITALS
WEIGHT: 137.4 LBS | BODY MASS INDEX: 24.34 KG/M2 | RESPIRATION RATE: 16 BRPM | TEMPERATURE: 98.4 F | HEART RATE: 81 BPM | HEIGHT: 63 IN | OXYGEN SATURATION: 97 % | SYSTOLIC BLOOD PRESSURE: 108 MMHG | DIASTOLIC BLOOD PRESSURE: 68 MMHG

## 2022-06-21 DIAGNOSIS — K59.03 DRUG-INDUCED CONSTIPATION: ICD-10-CM

## 2022-06-21 DIAGNOSIS — I10 ESSENTIAL HYPERTENSION: ICD-10-CM

## 2022-06-21 DIAGNOSIS — E53.8 VITAMIN B12 DEFICIENCY: ICD-10-CM

## 2022-06-21 DIAGNOSIS — Z00.00 WELLNESS EXAMINATION: ICD-10-CM

## 2022-06-21 DIAGNOSIS — D64.9 ANEMIA, UNSPECIFIED TYPE: ICD-10-CM

## 2022-06-21 DIAGNOSIS — M81.0 OSTEOPOROSIS, UNSPECIFIED OSTEOPOROSIS TYPE, UNSPECIFIED PATHOLOGICAL FRACTURE PRESENCE: ICD-10-CM

## 2022-06-21 DIAGNOSIS — R53.83 OTHER FATIGUE: Primary | ICD-10-CM

## 2022-06-21 DIAGNOSIS — R10.9 ABDOMINAL PAIN, UNSPECIFIED ABDOMINAL LOCATION: ICD-10-CM

## 2022-06-21 DIAGNOSIS — R51.9 NONINTRACTABLE HEADACHE, UNSPECIFIED CHRONICITY PATTERN, UNSPECIFIED HEADACHE TYPE: ICD-10-CM

## 2022-06-21 DIAGNOSIS — E55.9 VITAMIN D DEFICIENCY: ICD-10-CM

## 2022-06-21 DIAGNOSIS — R19.7 DIARRHEA, UNSPECIFIED TYPE: ICD-10-CM

## 2022-06-21 PROCEDURE — 99213 OFFICE O/P EST LOW 20 MIN: CPT | Performed by: FAMILY MEDICINE

## 2022-06-21 RX ORDER — LOPERAMIDE HYDROCHLORIDE 2 MG/1
2 TABLET ORAL 4 TIMES DAILY PRN
Start: 2022-06-21

## 2022-06-21 RX ORDER — CARBAMAZEPINE 200 MG/1
TABLET ORAL
COMMUNITY
Start: 2022-06-14 | End: 2022-06-23

## 2022-06-21 NOTE — TELEPHONE ENCOUNTER
Caller: Hillary Overton    Relationship: Self    Best call back number: [phone]    What medication are you requesting: FOR DIARRHEA CESSATION    What are your current symptoms: VERY WATERY FREQUENT DIARRHEA, LOSS OF BOWEL CONTROL    How long have you been experiencing symptoms: YESTERDAY     Have you had these symptoms before:    [] Yes  [x] No    Have you been treated for these symptoms before:   [] Yes  [x] No    If a prescription is needed, what is your preferred pharmacy and phone number: [id number] - [place], [place] - [address] AT [employer] [facility] [address] [facility] [phone] [facility] [phone] FX     Additional notes: PLEASE CALL TO NOTIFY IF MEDICATION ORDERED.

## 2022-06-21 NOTE — PROGRESS NOTES
Subjective   Hillary Overton is a 86 y.o. female presenting with chief complaint of:   Chief Complaint   Patient presents with   • Diarrhea     Patient is complaining on diarrhea. Symptoms started yesterday.       History of Present Illness :  Alone.  Here for primarily an acute issue today; diarrhea.  She saw Dr. Sherwood and ear nose and throat for swelling around her eye and headache and possibly some ear concerns; he must of diagnosed trigeminal neuralgia because he gave her Tegretol.  She developed significant constipation to the point of rectal pain no bowel movements for couple days and abdominal cramping distention.  A friend gave her Dulcolax and she took 2; then she started having continuous watery nonbloody diarrhea that is persisted into the day for least 24 hours.  She came for her regular labs today and mention this; she wanted to be seen.  There has been fortunately no vomiting    Has multiple chronic problems to consider that might have a bearing on today's issues; not an interval appointment.       Chronic/acute problems reviewed today:   1. Drug-induced constipation: acute/with above   2. Diarrhea, unspecified type: acute/with above   3. Nonintractable headache, unspecified chronicity pattern, unspecified headache type chronic/variable; better today   4. Abdominal pain, unspecified abdominal location: acute/with above     Has an/another acute issue today: none.    The following portions of the patient's history were reviewed and updated as appropriate: allergies, current medications, past family history, past medical history, past social history, past surgical history and problem list.      Current Outpatient Medications:   •  aspirin 81 MG EC tablet, Take 1 tablet by mouth Daily., Disp: , Rfl:   •  B Complex Vitamins (B COMPLEX PO), Take 1 tablet by mouth Daily., Disp: , Rfl:   •  BIOTIN 5000 PO, Take 1 capsule by mouth Daily., Disp: , Rfl:   •  Calcium Carb-Cholecalciferol (CALCIUM-VITAMIN D)  600-400 MG-UNIT tablet, Take 1 tablet by mouth Daily., Disp: , Rfl:   •  carBAMazepine (TEGretol) 200 MG tablet, , Disp: , Rfl:   •  denosumab (Prolia) 60 MG/ML solution prefilled syringe syringe, Inject 1 mL under the skin into the appropriate area as directed Every 6 (Six) Months., Disp: 1 mL, Rfl: 1  •  lisinopril (PRINIVIL,ZESTRIL) 10 MG tablet, TAKE 1 TABLET BY MOUTH DAILY, Disp: 30 tablet, Rfl: 2  •  losartan (COZAAR) 50 MG tablet, TAKE 1 TABLET BY MOUTH DAILY, Disp: 90 tablet, Rfl: 3  •  Multiple Vitamins-Minerals (CENTRUM SILVER ULTRA WOMENS) tablet, Take 1 tablet by mouth Daily., Disp: , Rfl:   •  Omega-3 Fatty Acids (FISH OIL) 1000 MG capsule capsule, Take 1,000 mg by mouth Daily With Breakfast., Disp: , Rfl:   •  triamcinolone (KENALOG) 0.1 % ointment, Apply  topically to the appropriate area as directed 2 (Two) Times a Day As Needed for Irritation., Disp: 15 g, Rfl: 1  •  vitamin B-12 (CYANOCOBALAMIN) 100 MCG tablet, Take 50 mcg by mouth., Disp: , Rfl:     No problems with medications.    No Known Allergies    Review of Systems  GENERAL:  Active/slower with limits, speed, stamina for age and today. Sleep is ok. No fever now/recent.  SKIN: No rash/skin lesion of concern beyond if above.   ENDO:  No syncope, near or diaphoretic sweaty spells.  HEENT: No recent head injury; same on/off headache.   No vision change.  No significant hearing loss.  Ears without pain/drainage.  No sore throat.  No significant nasal/sinus congestion/drainage. No epistaxis.  CHEST: No chest wall tenderness or mass. No cough, without wheeze.  No SOB; no hemoptysis.  CV: No chest pain, palpitations, ankle edema.  GI: No heartburn, dysphagia.  No usual abdominal pain, diarrhea, constipation-see above.  No rectal bleeding, or melena.    :  Voids without dysuria, or incontinence to completion.  ORTHO: No painful/swollen joints but various on /off sore.  No sore neck or back.  No acute neck or back pain without recent injury.  NEURO:  No dizziness, weakness of extremities.  No numbness/paresthesias.   PSYCH: No memory loss.  Mood good; not anxious, depressed but/and not suicidal.  Tries to tolerate stress .   Screening:  Mammogram: 6.4.20-ordered  Bone density: 7.24.20 bone d-deca/[facility] -2.6 hip -1.7/7.24.20; qualifies for [facility]  GI:   EGD-Inflammatory change of bulb/[facility]//[facility]/08-24-05  H pylori/[facility]/8-24-05  Colonoscopy+neg/[facility]//11-17-10/10y  Prostate: NA  Usual lab order  12m CBC, CMP, LIPID, TSH, Vit D, iron, B12, folate    Copy/paste function used for ROS/exam AND each area of these were reviewed, updated, confirmed and supplemented as needed.   Data reviewed:   Recent admit/ER/MD visits: last visit  Last cardiac testing:   Echo: none    Radiology considered:   No radiology results for the last 90 days.    Lab Results:  Results for orders placed or performed in visit on 06/21/21   Comprehensive Metabolic Panel    Specimen: Blood   Result Value Ref Range    Glucose 96 65 - 99 mg/dL    BUN 15 8 - 23 mg/dL    Creatinine 0.76 0.57 - 1.00 mg/dL    eGFR Non African Am 73 >60 mL/min/1.73    eGFR African Am 88 >60 mL/min/1.73    BUN/Creatinine Ratio 19.7 7.0 - 25.0    Sodium 140 136 - 145 mmol/L    Potassium 4.6 3.5 - 5.2 mmol/L    Chloride 104 98 - 107 mmol/L    Total CO2 26.5 22.0 - 29.0 mmol/L    Calcium 9.3 8.6 - 10.5 mg/dL    Total Protein 6.4 6.0 - 8.5 g/dL    Albumin 4.30 3.50 - 5.20 g/dL    Globulin 2.1 gm/dL    A/G Ratio 2.0 g/dL    Total Bilirubin 0.5 0.0 - 1.2 mg/dL    Alkaline Phosphatase 91 39 - 117 U/L    AST (SGOT) 31 1 - 32 U/L    ALT (SGPT) 16 1 - 33 U/L   Lipid Panel    Specimen: Blood   Result Value Ref Range    Total Cholesterol 154 0 - 200 mg/dL    Triglycerides 100 0 - 150 mg/dL    HDL Cholesterol 60 40 - 60 mg/dL    VLDL Cholesterol Bruce 18 5 - 40 mg/dL    LDL Chol Calc (NIH) 76 0 - 100 mg/dL   TSH    Specimen: Blood   Result Value Ref Range    TSH 1.590 0.270 - 4.200 uIU/mL   Vitamin D 25 Hydroxy    Specimen: Blood    Result Value Ref Range    25 Hydroxy, Vitamin D 64.5 30.0 - 100.0 ng/ml   Iron    Specimen: Blood   Result Value Ref Range    Iron 85 37 - 145 mcg/dL   Vitamin B12    Specimen: Blood   Result Value Ref Range    Vitamin B-12 1,145 (H) 211 - 946 pg/mL   Folate    Specimen: Blood   Result Value Ref Range    Folate 18.40 4.78 - 24.20 ng/mL   CBC & Differential    Specimen: Blood   Result Value Ref Range    WBC 7.90 3.40 - 10.80 10*3/mm3    RBC 4.84 3.77 - 5.28 10*6/mm3    Hemoglobin 15.0 12.0 - 15.9 g/dL    Hematocrit 44.6 34.0 - 46.6 %    MCV 92.1 79.0 - 97.0 fL    MCH 31.0 26.6 - 33.0 pg    MCHC 33.6 31.5 - 35.7 g/dL    RDW 13.1 12.3 - 15.4 %    Platelets 342 140 - 450 10*3/mm3    Neutrophil Rel % 47.1 42.7 - 76.0 %    Lymphocyte Rel % 29.4 19.6 - 45.3 %    Monocyte Rel % 8.9 5.0 - 12.0 %    Eosinophil Rel % 12.8 (H) 0.3 - 6.2 %    Basophil Rel % 1.5 0.0 - 1.5 %    Neutrophils Absolute 3.73 1.70 - 7.00 10*3/mm3    Lymphocytes Absolute 2.32 0.70 - 3.10 10*3/mm3    Monocytes Absolute 0.70 0.10 - 0.90 10*3/mm3    Eosinophils Absolute 1.01 (H) 0.00 - 0.40 10*3/mm3    Basophils Absolute 0.12 0.00 - 0.20 10*3/mm3    Immature Granulocyte Rel % 0.3 0.0 - 0.5 %    Immature Grans Absolute 0.02 0.00 - 0.05 10*3/mm3    nRBC 0.1 0.0 - 0.2 /100 WBC       A1C:No results for input(s): HGBA1C in the last 61365 hours.  GLUCOSE:  Lab Results - Last 18 Months   Lab Units 06/21/21  0704   GLUCOSE mg/dL 96     LIPID:  Lab Results - Last 18 Months   Lab Units 06/21/21  0704   CHOLESTEROL mg/dL 154   LDL CHOL mg/dL 76   HDL CHOL mg/dL 60   TRIGLYCERIDES mg/dL 100     PSA:No results for input(s): PSA in the last 53399 hours.    CBC:  Lab Results - Last 18 Months   Lab Units 06/21/21  0704   WBC 10*3/mm3 7.90   HEMOGLOBIN g/dL 15.0   HEMATOCRIT % 44.6   PLATELETS 10*3/mm3 342   IRON mcg/dL 85      BMP/CMP:  Lab Results - Last 18 Months   Lab Units 06/21/21  0704   SODIUM mmol/L 140   POTASSIUM mmol/L 4.6   CHLORIDE mmol/L 104   TOTAL CO2  "mmol/L 26.5   GLUCOSE mg/dL 96   BUN mg/dL 15   CREATININE mg/dL 0.76   EGFR IF NONAFRICN AM mL/min/1.73 73   EGFR IF AFRICN AM mL/min/1.73 88   CALCIUM mg/dL 9.3     HEPATIC:  Lab Results - Last 18 Months   Lab Units 06/21/21  0704   ALT (SGPT) U/L 16   AST (SGOT) U/L 31   ALK PHOS U/L 91     Vit D:  Lab Results - Last 18 Months   Lab Units 06/21/21  0704   VIT D 25 HYDROXY ng/ml 64.5     THYROID:  Lab Results - Last 18 Months   Lab Units 06/21/21  0704   TSH uIU/mL 1.590       Objective   /68 (BP Location: Right arm, Patient Position: Sitting, Cuff Size: Adult)   Pulse 81   Temp 98.4 °F (36.9 °C) (Temporal)   Resp 16   Ht 159.4 cm (62.75\")   Wt 62.3 kg (137 lb 6.4 oz)   SpO2 97%   BMI 24.53 kg/m²   Body mass index is 24.53 kg/m².    Recent Vitals       6/23/2020 6/23/2021 6/21/2022       BP: 158/82 130/78 108/68     Pulse: 78 89 81     Temp: 98.6 °F (37 °C) 97.8 °F (36.6 °C) 98.4 °F (36.9 °C)     Weight: 60.8 kg (134 lb) 60.8 kg (134 lb) 62.3 kg (137 lb 6.4 oz)     BMI (Calculated): 23.9 23.9 24.5         Physical Exam  GENERAL:  Well nourished/developed in no acute distress.   SKIN: Turgor excellent, without wound, rash, lesion  HEENT: Normal cephalic without trauma.  Pupils equal round reactive to light. Extraocular motions full without nystagmus.     Oral cavity without growths, exudates, and moist.  Posterior pharynx without mass, obstruction, redness.  No thyromegaly, mass, tenderness, lymphadenopathy and supple.  CV: Regular rhythm.  No murmur, gallop,  edema. Posterior pulses intact.  No carotid bruits.  CHEST: No chest wall tenderness or mass.   LUNGS: Symmetric motion with clear to auscultation.    ABD: Soft, nontender without mass.   ORTHO: Symmetric extremities without swelling/point tenderness.  Full gross range of motion.  NEURO: CN 2-12 grossly intact.  Symmetric facies and UE/LE. 3/5 strength throughout. 1/4 x bicep  equal reflexes.  Nonfocal use extremities. Speech clear.   PSYCH: " Oriented x 3.  Pleasant calm, well kept.  Purposeful/directed conservation with intact short/long gross memory      Assessment & Plan     1. Drug-induced constipation    2. Diarrhea, unspecified type    3. Nonintractable headache, unspecified chronicity pattern, unspecified headache type    4. Abdominal pain, unspecified abdominal location        Discussions/medical decisions/reviews:  BP ok  Other vitals ok  Labs done this AM  DM/BS ok last  Lipid ok last  PSA NA  CBC ok last  Renal ok last  Liver ok last  Vit D ok last  Thyroid ok last    Stop tegretol; level requested as add on  Stop ducolox  Imodium; OTC  Keep f/u 6.23.22    Medical decision issues:   Data review above:   Rx: reviewed and decisions:   Visit today involved chronic significant medical problems or differentials and/or intensive drug monitoring: ie potential to cause serious morbidity or death:   Rx changes:   New Medications Ordered This Visit   Medications   • loperamide (Imodium A-D) 2 MG tablet     Sig: Take 1 tablet by mouth 4 (Four) Times a Day As Needed for Diarrhea.     Orders placed:   LAB/Testing/Referrals: reviewed/orders:   Today:   Orders Placed This Encounter   Procedures   • Carbamazepine level, total     Chronic/recurrent labs above or change to:   same     Screening reviewed/updated through process    Immunization History   Administered Date(s) Administered   • COVID-19 (MODERNA) 1st, 2nd, 3rd Dose Only 02/25/2021, 03/25/2021   • Fluzone High Dose =>65 Years ([facility] ONLY) 11/07/2019   • Fluzone Split Quad (Multi-dose) 11/23/2016, 10/31/2017   • Influenza Quad Vaccine (Inpatient) 10/31/2017   • PEDS-Pneumococcal Conjugate (PCV7) 10/09/2009   • Pneumococcal Conjugate 13-Valent (PCV13) 10/21/2015   • Pneumococcal, Unspecified 10/09/2009   • Tdap 06/23/2021         There are no Patient Instructions on file for this visit.    Follow up: Return for no stick lab; and keep f/u 6.23.22 as planned.  Future Appointments   Date Time Provider  Department Center   6/23/2022  1:00 PM Victor Manuel Wright MD [facility]

## 2022-06-22 LAB
25(OH)D3+25(OH)D2 SERPL-MCNC: 65.9 NG/ML (ref 30–100)
ALBUMIN SERPL-MCNC: 4.4 G/DL (ref 3.5–5.2)
ALBUMIN/GLOB SERPL: 2.1 G/DL
ALP SERPL-CCNC: 112 U/L (ref 39–117)
ALT SERPL-CCNC: 25 U/L (ref 1–33)
AST SERPL-CCNC: 57 U/L (ref 1–32)
BASOPHILS # BLD AUTO: 0.04 10*3/MM3 (ref 0–0.2)
BASOPHILS NFR BLD AUTO: 0.3 % (ref 0–1.5)
BILIRUB SERPL-MCNC: 0.7 MG/DL (ref 0–1.2)
BUN SERPL-MCNC: 13 MG/DL (ref 8–23)
BUN/CREAT SERPL: 18.6 (ref 7–25)
CALCIUM SERPL-MCNC: 9.4 MG/DL (ref 8.6–10.5)
CHLORIDE SERPL-SCNC: 95 MMOL/L (ref 98–107)
CHOLEST SERPL-MCNC: 159 MG/DL (ref 0–200)
CO2 SERPL-SCNC: 26.7 MMOL/L (ref 22–29)
CREAT SERPL-MCNC: 0.7 MG/DL (ref 0.57–1)
EGFRCR SERPLBLD CKD-EPI 2021: 85.4 ML/MIN/1.73
EOSINOPHIL # BLD AUTO: 0.04 10*3/MM3 (ref 0–0.4)
EOSINOPHIL NFR BLD AUTO: 0.3 % (ref 0.3–6.2)
ERYTHROCYTE [DISTWIDTH] IN BLOOD BY AUTOMATED COUNT: 12.9 % (ref 12.3–15.4)
FOLATE SERPL-MCNC: 12.4 NG/ML (ref 4.78–24.2)
GLOBULIN SER CALC-MCNC: 2.1 GM/DL
GLUCOSE SERPL-MCNC: 105 MG/DL (ref 65–99)
HCT VFR BLD AUTO: 40.7 % (ref 34–46.6)
HDLC SERPL-MCNC: 75 MG/DL (ref 40–60)
HGB BLD-MCNC: 14.2 G/DL (ref 12–15.9)
IMM GRANULOCYTES # BLD AUTO: 0.06 10*3/MM3 (ref 0–0.05)
IMM GRANULOCYTES NFR BLD AUTO: 0.5 % (ref 0–0.5)
IRON SERPL-MCNC: 83 MCG/DL (ref 37–145)
LDLC SERPL CALC-MCNC: 69 MG/DL (ref 0–100)
LDLC/HDLC SERPL: 0.91 {RATIO}
LYMPHOCYTES # BLD AUTO: 1.39 10*3/MM3 (ref 0.7–3.1)
LYMPHOCYTES NFR BLD AUTO: 11.1 % (ref 19.6–45.3)
MCH RBC QN AUTO: 31.1 PG (ref 26.6–33)
MCHC RBC AUTO-ENTMCNC: 34.9 G/DL (ref 31.5–35.7)
MCV RBC AUTO: 89.1 FL (ref 79–97)
MONOCYTES # BLD AUTO: 0.8 10*3/MM3 (ref 0.1–0.9)
MONOCYTES NFR BLD AUTO: 6.4 % (ref 5–12)
NEUTROPHILS # BLD AUTO: 10.18 10*3/MM3 (ref 1.7–7)
NEUTROPHILS NFR BLD AUTO: 81.4 % (ref 42.7–76)
NRBC BLD AUTO-RTO: 0 /100 WBC (ref 0–0.2)
PLATELET # BLD AUTO: 293 10*3/MM3 (ref 140–450)
POTASSIUM SERPL-SCNC: 4.4 MMOL/L (ref 3.5–5.2)
PROT SERPL-MCNC: 6.5 G/DL (ref 6–8.5)
RBC # BLD AUTO: 4.57 10*6/MM3 (ref 3.77–5.28)
SODIUM SERPL-SCNC: 132 MMOL/L (ref 136–145)
TRIGL SERPL-MCNC: 80 MG/DL (ref 0–150)
TSH SERPL DL<=0.005 MIU/L-ACNC: 1.1 UIU/ML (ref 0.27–4.2)
VIT B12 SERPL-MCNC: 844 PG/ML (ref 211–946)
VLDLC SERPL CALC-MCNC: 15 MG/DL (ref 5–40)
WBC # BLD AUTO: 12.51 10*3/MM3 (ref 3.4–10.8)

## 2022-06-22 NOTE — PROGRESS NOTES
I called and spoke with patient letting her know that her lab work was okay from yesterday considering her diarrhea issue. I let her know that the lab work she had done this working we will discuss that at her appointment tomorrow am.

## 2022-06-23 ENCOUNTER — OFFICE VISIT (OUTPATIENT)
Dept: FAMILY MEDICINE CLINIC | Facility: CLINIC | Age: 87
End: 2022-06-23

## 2022-06-23 VITALS
HEIGHT: 63 IN | BODY MASS INDEX: 23.92 KG/M2 | HEART RATE: 78 BPM | WEIGHT: 135 LBS | OXYGEN SATURATION: 98 % | DIASTOLIC BLOOD PRESSURE: 76 MMHG | RESPIRATION RATE: 16 BRPM | TEMPERATURE: 97.1 F | SYSTOLIC BLOOD PRESSURE: 126 MMHG

## 2022-06-23 DIAGNOSIS — R51.9 NONINTRACTABLE HEADACHE, UNSPECIFIED CHRONICITY PATTERN, UNSPECIFIED HEADACHE TYPE: ICD-10-CM

## 2022-06-23 DIAGNOSIS — I10 PRIMARY HYPERTENSION: Chronic | ICD-10-CM

## 2022-06-23 DIAGNOSIS — Z12.11 ENCOUNTER FOR SCREENING FOR MALIGNANT NEOPLASM OF COLON: ICD-10-CM

## 2022-06-23 DIAGNOSIS — M81.0 AGE RELATED OSTEOPOROSIS, UNSPECIFIED PATHOLOGICAL FRACTURE PRESENCE: ICD-10-CM

## 2022-06-23 DIAGNOSIS — Z79.01 ANTICOAGULATED: ICD-10-CM

## 2022-06-23 DIAGNOSIS — H57.11 PAIN OF RIGHT EYE: ICD-10-CM

## 2022-06-23 DIAGNOSIS — K59.00 CONSTIPATION, UNSPECIFIED CONSTIPATION TYPE: ICD-10-CM

## 2022-06-23 DIAGNOSIS — I50.9 CONGESTIVE HEART FAILURE, UNSPECIFIED HF CHRONICITY, UNSPECIFIED HEART FAILURE TYPE: ICD-10-CM

## 2022-06-23 DIAGNOSIS — Z00.00 WELLNESS EXAMINATION: ICD-10-CM

## 2022-06-23 DIAGNOSIS — Z12.31 ENCOUNTER FOR SCREENING MAMMOGRAM FOR BREAST CANCER: ICD-10-CM

## 2022-06-23 DIAGNOSIS — D64.9 ANEMIA, UNSPECIFIED TYPE: ICD-10-CM

## 2022-06-23 LAB — CARBAMAZEPINE SERPL-MCNC: 4.5 MCG/ML (ref 4–12)

## 2022-06-23 PROCEDURE — G0439 PPPS, SUBSEQ VISIT: HCPCS | Performed by: FAMILY MEDICINE

## 2022-06-23 PROCEDURE — 99213 OFFICE O/P EST LOW 20 MIN: CPT | Performed by: FAMILY MEDICINE

## 2022-06-23 PROCEDURE — 1170F FXNL STATUS ASSESSED: CPT | Performed by: FAMILY MEDICINE

## 2022-06-23 PROCEDURE — 1159F MED LIST DOCD IN RCRD: CPT | Performed by: FAMILY MEDICINE

## 2022-06-23 NOTE — PATIENT INSTRUCTIONS
"Medicare/insurances offer certain visits called \"wellness/annual\" that allows for time to deal with and  review the many aspects of \"being well\" that just might not get mentioned during other visits with your doctor through the year.  This includes things like reviews of health screenings (mammograms, various labs),  weight, exercise, vaccines for just a few examples.      In order to help you with this we wish to make you aware of a few things for you to consider:    1. Advanced directives.  These are documents used to help direct your care if your health/situation should reach a point that you cannot make your own decisions.  While it is likely you do not currently have a need for these documents now; it is something that we all might face at any time.   The hand outs you are being given today are simply for you to review and use to learn more about these documents and consider them as you wish.      2. Vaccines: Certain vaccines are important after age 50, 60, and 65 and some health situations (for example COPD), require even boosters beyond age 65.  We are happy to review with you your vaccine status and vaccines that might be needed for you at this point:      a. Tetanus.   Like anyone this needs to given every 10 years; sooner for/with lacerations/wounds.   Likely when getting this booster it needs to be a tetanus called Tdap (tetanus mixed with diptheria and pertussis).   Years ago you had this vaccine.  We now know we can lose our immunity to pertussis (a part of this vaccine) and run a risk of catching this.  Now only would this make us ill; but more importantly we can spread this to very young children (and for them it can be a much more dangerous illness).   We call this the grandparent vaccine for this reason.     b. Pneumonia (strept).   This comes now with two brands.   It is recommended to take pneumovax first; and a year later take the cousin prevnar.  Even if you have had these before; we need to " review when and your current health situation/s as you may need boosters and even recently the CDC has made recent/new recommendations for pneumovax.      c. Shingles.  You do not want to catch shingles.  Though you will recover from this; the pain associated with shingles can be severe.  Even if you have had the now older zostavax, or have had shingles; it is recommended you still get the Shingrix (the new vaccine just available early 2018 shingles vaccine).  A new shingles vaccine (a shot to lower your chance of catching shingles) is now available (shingrix).  This vaccine is the second vaccine created for this purpose; (we have had zostavax for years).  Shingrix provides a much better and longer immunity for shingles than zostavax.  For this and other reasons Shingrix can be started at age 50.  If you have had zostavax in the past; you can still take Shingrix.      This vaccine is not paid for in a doctor's office by medicare, medicaid and probably most insurances.  Like zostavax; this is covered in drug stores.  This is a vaccine that if you chose to get you need to get at a drug store that gives vaccines (like [facility], [facility] and [facility].      d. Yearly flu vaccine given from September through April each year (there is a special vaccine for those over 65).     e. Travel vaccines:  If you are one to do international travel; be sure and ask us for any particular unusual vaccines you may need.     f.  Miscellaneous:  If you have certain health situations/disease you may need specific/particular vaccines not give to the general public.     g.  Covid: currently recommended everyone over 5.  The brands Pfizer/Moderna are for 3 total shots as immunity will wane from less than this.  Eber/Eber has a version that comes with recommendations for an initial vaccine and booster after.  I no longer recommend J&J as a first choice as Pfizer/Moderna are readily available.  If you have had an  initial J&J I recommend you booster with Moderna.   I strongly recommend covid vaccination; being unvaccinated or partially vaccinated carries real risk for disease and even death.     Because of many restrictions on this office always having all the above vaccines; you may be advised to work with your local health department to keep up with your individual vaccine needs.    The vaccines we have on record for you include:   Immunization History   Administered Date(s) Administered    COVID-19 (MODERNA) 1st, 2nd, 3rd Dose Only 02/25/2021, 03/25/2021    Fluzone High Dose =>65 Years ([facility] ONLY) 11/07/2019    Fluzone Split Quad (Multi-dose) 11/23/2016, 10/31/2017    Influenza Quad Vaccine (Inpatient) 10/31/2017    PEDS-Pneumococcal Conjugate (PCV7) 10/09/2009    Pneumococcal Conjugate 13-Valent (PCV13) 10/21/2015    Pneumococcal, Unspecified 10/09/2009    Tdap 06/23/2021       If you have record of other vaccines and want them to show in your chart here; please talk to our nurses about having your vaccine record updated.     3. Exercise: regular cardio exercise something everyone should consider and try to do; even if health limitations (ie find that exercise UE/LE/cardio that they can tolerate).   Normal weight a goal for everyone (as we discussed)    4. Healthy diet helpful for weight management, illness prevention.     5. If over 50-screening exams include men PSA/rectal exam, women mammograms, and everyone colonoscopy screening for colon cancer.    6. If you use tobacco of any kind or e-products you should stop. We are providing you some information to consider that could make this process easier.      ##################################

## 2022-06-23 NOTE — PROGRESS NOTES
"Subjective   Hillary Overton is a 86 y.o. female presenting with chief complaint of:   Chief Complaint   Patient presents with   • Fatigue     ? B12 injection? weakness   • Medicare Wellness-subsequent   • Constipation     \"I strained so hard last night and there was bright red blood on the paper\"       History of Present Illness :  Alone.   Here for review of chronic problems that includes HTN and others.  Also yearly medicare wellness exam.    Has multiple chronic problems to consider that might have a bearing on today's issues;  an interval appointment.       Chronic/acute problems reviewed today:   1. Primary hypertension Chronic/stable. Stable here past/and occasional home blood pressures.  No significant chest pain, SOB, LE edema, orthopnea, near syncope, dizziness/light headness.   Recent Vitals       6/21/2022 6/23/2022 6/23/2022       BP: 108/68 158/68 126/76     Pulse: 81 78 --     Temp: 98.4 °F (36.9 °C) 97.1 °F (36.2 °C) --     Weight: 62.3 kg (137 lb 6.4 oz) 61.2 kg (135 lb) --     BMI (Calculated): 24.5 24.1 --            2. Congestive heart failure, unspecified HF chronicity, unspecified heart failure type (HCC) Chronic/stable.  Denies significant sob, orthopnea, leg edema, weight gain.  Aware of influence diet/salt and watching weight at home.       3. Anticoagulated prevention/ASA 81 Chronic/stable reason for stopping or use of.  Denies bleeding issues; especially epistaxis, melena, hematochezia.  Upper arms/others do not significantly bruise easily.  No significant bleeding or falls.      4. Wellness examination-done Chronic ongoing over time screening or advice for general health care/wellness.      5. Anemia, unspecified type Chronic or past history/stable: This has been present before.    There has been GI evaulation in the past. There is no current melena, hematochezia. It has been benign to date and stable/watching.  Contributing comorbidities to date: behind on gi.     6. Age related " osteoporosis, unspecified pathological fracture presence Chronic/stable.  Last bone density/if below.   Has prolia Rx.  Ok f/u further bone density screening when due.      7. Nonintractable headache, unspecified chronicity pattern, unspecified headache type chronic stable headaches until the eye pain   8. Constipation, unspecified constipation type see above   9. Encounter for screening mammogram for breast cancer Chronic/ongoing yearly need to review for breast cancer.  No breast pain, masses, discharge.       10. Encounter for screening for malignant neoplasm of colon chronic ongoing need to review her risk for colon cancer.  She is behind.  Plus this constipation issue and even blood in her stool with straining   11. Pain of right eye recent on and off redness and discomfort of the right eye with no visual change.  She tried to get an appointment with ophthalmology group and it is for August.     Has an/another acute issue today: recent constipation after tegretol and then after dulcolax diarrhea; all resolved with dc of these Rx.    The following portions of the patient's history were reviewed and updated as appropriate: allergies, current medications, past family history, past medical history, past social history, past surgical history and problem list.      Current Outpatient Medications:   •  aspirin 81 MG EC tablet, Take 1 tablet by mouth Daily., Disp: , Rfl:   •  B Complex Vitamins (B COMPLEX PO), Take 1 tablet by mouth Daily., Disp: , Rfl:   •  BIOTIN 5000 PO, Take 1 capsule by mouth Daily., Disp: , Rfl:   •  Calcium Carb-Cholecalciferol (CALCIUM-VITAMIN D) 600-400 MG-UNIT tablet, Take 1 tablet by mouth Daily., Disp: , Rfl:   •  denosumab (Prolia) 60 MG/ML solution prefilled syringe syringe, Inject 1 mL under the skin into the appropriate area as directed Every 6 (Six) Months., Disp: 1 mL, Rfl: 1  •  lisinopril (PRINIVIL,ZESTRIL) 10 MG tablet, TAKE 1 TABLET BY MOUTH DAILY, Disp: 30 tablet, Rfl: 2  •   losartan (COZAAR) 50 MG tablet, TAKE 1 TABLET BY MOUTH DAILY, Disp: 90 tablet, Rfl: 3  •  Multiple Vitamins-Minerals (CENTRUM SILVER ULTRA WOMENS) tablet, Take 1 tablet by mouth Daily., Disp: , Rfl:   •  Omega-3 Fatty Acids (FISH OIL) 1000 MG capsule capsule, Take 1,000 mg by mouth Daily With Breakfast., Disp: , Rfl:   •  triamcinolone (KENALOG) 0.1 % ointment, Apply  topically to the appropriate area as directed 2 (Two) Times a Day As Needed for Irritation., Disp: 15 g, Rfl: 1  •  vitamin B-12 (CYANOCOBALAMIN) 100 MCG tablet, Take 50 mcg by mouth., Disp: , Rfl:   •  loperamide (Imodium A-D) 2 MG tablet, Take 1 tablet by mouth 4 (Four) Times a Day As Needed for Diarrhea., Disp: , Rfl:     No problems with medications.    No Known Allergies    Review of Systems  GENERAL:  Active/slower with limits, speed, stamina for age and today. Sleep is ok. No fever now/recent.  SKIN: No rash/skin lesion of concern beyond if above.   ENDO:  No syncope, near or diaphoretic sweaty spells.  HEENT: No recent head injury; same on/off headache.   No vision change.  No significant hearing loss.  Ears without pain/drainage.  No sore throat.  No significant nasal/sinus congestion/drainage. No epistaxis.  CHEST: No chest wall tenderness or mass. No cough, without wheeze.  No SOB; no hemoptysis.  CV: No chest pain, palpitations, ankle edema.  GI: No heartburn, dysphagia.  No usual abdominal pain, diarrhea, constipation-see above.  No rectal bleeding, or melena.    :  Voids without dysuria, or incontinence to completion.  ORTHO: No painful/swollen joints but various on /off sore.  No sore neck or back.  No acute neck or back pain without recent injury.  NEURO: No dizziness, weakness of extremities.  No numbness/paresthesias.   PSYCH: No memory loss.  Mood good; not anxious, depressed but/and not suicidal.  Tries to tolerate stress .   Screening:  Mammogram: 6.4.20-ordered several times  Bone density: 7.24.20 bone d-deca/[facility] -2.6 hip  -1.7/7.24.20; qualifies for boniva  GI:   EGD-Inflammatory change of bulb/[facility]//[facility]/08-24-05  H pylori/[facility]/8-24-05  Colonoscopy+neg/[facility]//11-17-10/10y  Prostate: NA  Usual lab order  12m CBC, CMP, LIPID, TSH, Vit D, iron, B12, folate    Copy/paste function used for ROS/exam AND each area of these were reviewed, updated, confirmed and supplemented as needed.   Data reviewed:   Recent admit/ER/MD visits: last visit  Last cardiac testing:   Echo:    2005 WB - Echo- LVH amd diastolic dysfunction    Radiology considered:   No radiology results for the last 90 days.    Lab Results:  Results for orders placed or performed in visit on 06/21/22   Carbamazepine level, total    Specimen: Blood    Blood  Release to manasa   Result Value Ref Range    Carbamazepine Level 4.5 4.0 - 12.0 mcg/mL       A1C:No results for input(s): HGBA1C in the last 15972 hours.  GLUCOSE:  Lab Results - Last 18 Months   Lab Units 06/21/22  1000 06/21/21  0704   GLUCOSE mg/dL 105* 96     LIPID:  Lab Results - Last 18 Months   Lab Units 06/21/22  1000 06/21/21  0704   CHOLESTEROL mg/dL 159 154   LDL CHOL mg/dL 69 76   HDL CHOL mg/dL 75* 60   TRIGLYCERIDES mg/dL 80 100     PSA:No results for input(s): PSA in the last 24689 hours.    CBC:  Lab Results - Last 18 Months   Lab Units 06/21/22  1000 06/21/21  0704   WBC 10*3/mm3 12.51* 7.90   HEMOGLOBIN g/dL 14.2 15.0   HEMATOCRIT % 40.7 44.6   PLATELETS 10*3/mm3 293 342   IRON mcg/dL 83 85      BMP/CMP:  Lab Results - Last 18 Months   Lab Units 06/21/22  1000 06/21/21  0704   SODIUM mmol/L 132* 140   POTASSIUM mmol/L 4.4 4.6   CHLORIDE mmol/L 95* 104   TOTAL CO2 mmol/L 26.7 26.5   GLUCOSE mg/dL 105* 96   BUN mg/dL 13 15   CREATININE mg/dL 0.70 0.76   EGFR IF NONAFRICN AM mL/min/1.73  --  73   EGFR IF AFRICN AM mL/min/1.73  --  88   EGFR RESULT mL/min/1.73 85.4  --    CALCIUM mg/dL 9.4 9.3     HEPATIC:  Lab Results - Last 18 Months   Lab Units 06/21/22  1000 06/21/21  0704   ALT (SGPT) U/L 25 16   AST  "(SGOT) U/L 57* 31   ALK PHOS U/L 112 91     Vit D:  Lab Results - Last 18 Months   Lab Units 06/21/22  1000 06/21/21  0704   VIT D 25 HYDROXY ng/ml 65.9 64.5     THYROID:  Lab Results - Last 18 Months   Lab Units 06/21/22  1000 06/21/21  0704   TSH uIU/mL 1.100 1.590       Objective   /76 (BP Location: Right arm, Patient Position: Sitting, Cuff Size: Adult)   Pulse 78   Temp 97.1 °F (36.2 °C) (Infrared)   Resp 16   Ht 159.4 cm (62.75\")   Wt 61.2 kg (135 lb)   SpO2 98%   Breastfeeding No   BMI 24.11 kg/m²   Body mass index is 24.11 kg/m².    Recent Vitals       6/21/2022 6/23/2022 6/23/2022       BP: 108/68 158/68 126/76     Pulse: 81 78 --     Temp: 98.4 °F (36.9 °C) 97.1 °F (36.2 °C) --     Weight: 62.3 kg (137 lb 6.4 oz) 61.2 kg (135 lb) --     BMI (Calculated): 24.5 24.1 --           Physical Exam  GENERAL:  Well nourished/developed in no acute distress.   SKIN: Turgor excellent, without wound, rash, lesion  HEENT: Normal cephalic without trauma.  Pupils equal round reactive to light. Extraocular motions full without nystagmus.   External canals nonobstructive nontender without reddness. Tymphatic membranes cleve with edmund structures intact.   Oral cavity without growths, exudates, and moist.  Posterior pharynx without mass, obstruction, redness.  No thyromegaly, mass, tenderness, lymphadenopathy and supple.  CV: Regular rhythm.  No murmur, gallop,  edema. Posterior pulses intact.  No carotid bruits.  CHEST: No chest wall tenderness or mass.   LUNGS: Symmetric motion with clear to auscultation.    ABD: Soft, nontender without mass.   ORTHO: Symmetric extremities without swelling/point tenderness.  Full gross range of motion.  NEURO: CN 2-12 grossly intact.  Symmetric facies and UE/LE. 3/5 strength throughout. 1/4 x bicep  equal reflexes.  Nonfocal use extremities. Speech clear. Intact light touch with monofilament, vibratory sensation with tuning fork; equal toes/distal feet.    PSYCH: Oriented x " 3.  Pleasant calm, well kept.  Purposeful/directed conservation with intact short/long gross memory    Assessment & Plan     1. Primary hypertension    2. Congestive heart failure, unspecified HF chronicity, unspecified heart failure type (HCC)    3. Anticoagulated prevention/ASA 81    4. Wellness examination-done    5. Anemia, unspecified type    6. Age related osteoporosis, unspecified pathological fracture presence    7. Nonintractable headache, unspecified chronicity pattern, unspecified headache type    8. Constipation, unspecified constipation type    9. Encounter for screening mammogram for breast cancer    10. Encounter for screening for malignant neoplasm of colon    11. Pain of right eye        Discussions/medical decisions/reviews:  BP ok  Other vitals ok  DM/BS ok  Lipid ok  PSA NA  CBC ok  Renal ok  Liver ok  Vit D ok  Thyroid ok    Helping to move up Dr Castro apt for August due to new eye pain (making HA seem better)  Time redo bone density; agrees mammogram this year  Back to GI; constipation and 2y behind colonscopy    Medical decision issues:   Data review above:   Rx: reviewed and decisions:   Visit today involved chronic significant medical problems or differentials and/or intensive drug monitoring: ie potential to cause serious morbidity or death:   Rx changes: none  No orders of the defined types were placed in this encounter.    Orders placed:   LAB/Testing/Referrals: reviewed/orders:   Today:   Orders Placed This Encounter   Procedures   • Mammo Screening Digital Tomosynthesis Bilateral With CAD   • DEXA Bone Density Axial   • Ambulatory Referral to Gastroenterology   • Ambulatory Referral to Ophthalmology     Chronic/recurrent labs above or change to:   same     Wellness done   Screening reviewed/updated     Immunization History   Administered Date(s) Administered   • COVID-19 (MODERNA) 1st, 2nd, 3rd Dose Only 02/25/2021, 03/25/2021   • Fluzone High Dose =>65 Years ([employer] ONLY) 11/07/2019  "  • Fluzone Split Quad (Multi-dose) 11/23/2016, 10/31/2017   • Influenza Quad Vaccine (Inpatient) 10/31/2017   • PEDS-Pneumococcal Conjugate (PCV7) 10/09/2009   • Pneumococcal Conjugate 13-Valent (PCV13) 10/21/2015   • Pneumococcal, Unspecified 10/09/2009   • Tdap 06/23/2021     Vaccine reviewed: today none; later we advised/reaffirmed our support/suggestion for staying complete with covid- covid boosters, seasonal flu/yearly and any missing vaccine from list we supplied; we suggest contact with local health department office to review missing/needed vaccines and then bring nursing documentation for these vaccines to this office.     Health maintenance:   Body mass index is 24.11 kg/m².  BMI is within normal parameters. No other follow-up for BMI required.    Tobacco use reviewed:   Hillary Overton  reports that she quit smoking about 32 years ago. She started smoking about 66 years ago. She smoked 0.50 packs per day. She has never used smokeless tobacco..     Annual/wellness visit: also/today (see separate note)-medicare     Patient Instructions     Medicare/insurances offer certain visits called \"wellness/annual\" that allows for time to deal with and  review the many aspects of \"being well\" that just might not get mentioned during other visits with your doctor through the year.  This includes things like reviews of health screenings (mammograms, various labs),  weight, exercise, vaccines for just a few examples.      In order to help you with this we wish to make you aware of a few things for you to consider:    1. Advanced directives.  These are documents used to help direct your care if your health/situation should reach a point that you cannot make your own decisions.  While it is likely you do not currently have a need for these documents now; it is something that we all might face at any time.   The hand outs you are being given today are simply for you to review and use to learn more about these documents and " consider them as you wish.      2. Vaccines: Certain vaccines are important after age 50, 60, and 65 and some health situations (for example COPD), require even boosters beyond age 65.  We are happy to review with you your vaccine status and vaccines that might be needed for you at this point:      a. Tetanus.   Like anyone this needs to given every 10 years; sooner for/with lacerations/wounds.   Likely when getting this booster it needs to be a tetanus called Tdap (tetanus mixed with diptheria and pertussis).   Years ago you had this vaccine.  We now know we can lose our immunity to pertussis (a part of this vaccine) and run a risk of catching this.  Now only would this make us ill; but more importantly we can spread this to very young children (and for them it can be a much more dangerous illness).   We call this the grandparent vaccine for this reason.     b. Pneumonia (strept).   This comes now with two brands.   It is recommended to take pneumovax first; and a year later take the cousin prevnar.  Even if you have had these before; we need to review when and your current health situation/s as you may need boosters and even recently the CDC has made recent/new recommendations for pneumovax.      c. Shingles.  You do not want to catch shingles.  Though you will recover from this; the pain associated with shingles can be severe.  Even if you have had the now older zostavax, or have had shingles; it is recommended you still get the Shingrix (the new vaccine just available early 2018 shingles vaccine).  A new shingles vaccine (a shot to lower your chance of catching shingles) is now available (shingrix).  This vaccine is the second vaccine created for this purpose; (we have had zostavax for years).  Shingrix provides a much better and longer immunity for shingles than zostavax.  For this and other reasons Shingrix can be started at age 50.  If you have had zostavax in the past; you can still take Shingrix.      This  vaccine is not paid for in a doctor's office by medicare, medicaid and probably most insurances.  Like zostavax; this is covered in drug stores.  This is a vaccine that if you chose to get you need to get at a drug store that gives vaccines (like [facility], [facility] and [facility].      d. Yearly flu vaccine given from September through April each year (there is a special vaccine for those over 65).     e. Travel vaccines:  If you are one to do international travel; be sure and ask us for any particular unusual vaccines you may need.     f.  Miscellaneous:  If you have certain health situations/disease you may need specific/particular vaccines not give to the general public.     g.  Covid: currently recommended everyone over 5.  The brands Pfizer/Moderna are for 3 total shots as immunity will wane from less than this.  Earlier Media/Earlier Media has a version that comes with recommendations for an initial vaccine and booster after.  I no longer recommend J&J as a first choice as Pfizer/Moderna are readily available.  If you have had an initial J&J I recommend you booster with Moderna.   I strongly recommend covid vaccination; being unvaccinated or partially vaccinated carries real risk for disease and even death.     Because of many restrictions on this office always having all the above vaccines; you may be advised to work with your local health department to keep up with your individual vaccine needs.    The vaccines we have on record for you include:   Immunization History   Administered Date(s) Administered   • COVID-19 (MODERNA) 1st, 2nd, 3rd Dose Only 02/25/2021, 03/25/2021   • Fluzone High Dose =>65 Years ([facility] ONLY) 11/07/2019   • Fluzone Split Quad (Multi-dose) 11/23/2016, 10/31/2017   • Influenza Quad Vaccine (Inpatient) 10/31/2017   • PEDS-Pneumococcal Conjugate (PCV7) 10/09/2009   • Pneumococcal Conjugate 13-Valent (PCV13) 10/21/2015   • Pneumococcal, Unspecified 10/09/2009   • Tdap 06/23/2021        If you have record of other vaccines and want them to show in your chart here; please talk to our nurses about having your vaccine record updated.     3. Exercise: regular cardio exercise something everyone should consider and try to do; even if health limitations (ie find that exercise UE/LE/cardio that they can tolerate).   Normal weight a goal for everyone (as we discussed)    4. Healthy diet helpful for weight management, illness prevention.     5. If over 50-screening exams include men PSA/rectal exam, women mammograms, and everyone colonoscopy screening for colon cancer.    6. If you use tobacco of any kind or e-products you should stop. We are providing you some information to consider that could make this process easier.      ##################################              Follow up: Return for lab/Dr Wright 6m.  Future Appointments   Date Time Provider Department Center   12/23/2022  8:30 AM [facility]   12/28/2022  1:30 PM Victor Manuel Wright MD [facility]

## 2022-06-24 RX ORDER — LISINOPRIL 10 MG/1
10 TABLET ORAL DAILY
Qty: 30 TABLET | Refills: 2 | Status: SHIPPED | OUTPATIENT
Start: 2022-06-24 | End: 2022-09-22 | Stop reason: SDUPTHER

## 2022-06-24 NOTE — TELEPHONE ENCOUNTER
Rx Refill Note  Requested Prescriptions     Pending Prescriptions Disp Refills   • lisinopril (PRINIVIL,ZESTRIL) 10 MG tablet [Pharmacy Med Name: LISINOPRIL 10MG TABLETS] 30 tablet 2     Sig: TAKE 1 TABLET BY MOUTH DAILY      Last office visit with prescribing clinician: 6/23/2022      Next office visit with prescribing clinician: 12/28/2022            Bianka Hi MA  06/24/22, 07:05 CDT

## 2022-06-24 NOTE — PROGRESS NOTES
QUICK REFERENCE INFORMATION:  The ABCs of the Annual Wellness Visit    Subsequent Medicare Wellness Visit     HEALTH RISK ASSESSMENT    : 1935    Recent Hospitalizations:  No hospitalization(s) within the last year..  ccc      Current Medical Providers:  Patient Care Team:  Victor Manuel Wright MD as PCP - General        Smoking Status:  Social History     Tobacco Use   Smoking Status Former Smoker   • Packs/day: 0.50   • Start date: 1955   • Quit date: 1989   • Years since quittin.6   Smokeless Tobacco Never Used       Alcohol Consumption:  Social History     Substance and Sexual Activity   Alcohol Use No       Depression Screen:   PHQ-2/PHQ-9 Depression Screening 2022   Retired PHQ-9 Total Score -   Retired Total Score -   Little Interest or Pleasure in Doing Things 0-->not at all   Feeling Down, Depressed or Hopeless 0-->not at all   PHQ-9: Brief Depression Severity Measure Score 0       Health Habits and Functional and Cognitive Screening:  Functional & Cognitive Status 2022   Do you have difficulty preparing food and eating? No   Do you have difficulty bathing yourself, getting dressed or grooming yourself? No   Do you have difficulty using the toilet? No   Do you have difficulty moving around from place to place? No   Do you have trouble with steps or getting out of a bed or a chair? No   Current Diet Well Balanced Diet   Dental Exam Up to date   Eye Exam Up to date   Exercise (times per week) 3 times per week   Current Exercises Include Gardening   Do you need help using the phone?  No   Are you deaf or do you have serious difficulty hearing?  Yes   Do you need help with transportation? No   Do you need help shopping? No   Do you need help preparing meals?  No   Do you need help with housework?  No   Do you need help with laundry? No   Do you need help taking your medications? No   Do you need help managing money? No   Do you ever drive or ride in a car without wearing a  seat belt? No   Have you felt unusual stress, anger or loneliness in the last month? No   Who do you live with? Alone   If you need help, do you have trouble finding someone available to you? No   Have you been bothered in the last four weeks by sexual problems? No   Do you have difficulty concentrating, remembering or making decisions? No           Does the patient have evidence of cognitive impairment? No    Asiprin use counseling: Does not need ASA (and currently is not on it)      Recent Lab Results:          Lab Results   Component Value Date    TRIG 80 06/21/2022    HDL 75 (H) 06/21/2022    VLDL 15 06/21/2022    LDLHDL 0.91 06/21/2022           Age-appropriate Screening Schedule:  Refer to the list below for future screening recommendations based on patient's age, sex and/or medical conditions. Orders for these recommended tests are listed in the plan section. The patient has been provided with a written plan.    Health Maintenance   Topic Date Due   • ZOSTER VACCINE (1 of 2) Never done   • DXA SCAN  07/23/2022   • INFLUENZA VACCINE  10/01/2022   • TDAP/TD VACCINES (2 - Td or Tdap) 06/23/2031        Subjective   History of Present Illness    Hillary Overton is a 86 y.o. female who presents for an Annual Wellness Visit.    The following portions of the patient's history were reviewed and updated as appropriate: allergies, current medications, past family history, past medical history, past social history, past surgical history and problem list.    Outpatient Medications Prior to Visit   Medication Sig Dispense Refill   • aspirin 81 MG EC tablet Take 1 tablet by mouth Daily.     • B Complex Vitamins (B COMPLEX PO) Take 1 tablet by mouth Daily.     • BIOTIN 5000 PO Take 1 capsule by mouth Daily.     • Calcium Carb-Cholecalciferol (CALCIUM-VITAMIN D) 600-400 MG-UNIT tablet Take 1 tablet by mouth Daily.     • denosumab (Prolia) 60 MG/ML solution prefilled syringe syringe Inject 1 mL under the skin into the  appropriate area as directed Every 6 (Six) Months. 1 mL 1   • lisinopril (PRINIVIL,ZESTRIL) 10 MG tablet TAKE 1 TABLET BY MOUTH DAILY 30 tablet 2   • losartan (COZAAR) 50 MG tablet TAKE 1 TABLET BY MOUTH DAILY 90 tablet 3   • Multiple Vitamins-Minerals (CENTRUM SILVER ULTRA WOMENS) tablet Take 1 tablet by mouth Daily.     • Omega-3 Fatty Acids (FISH OIL) 1000 MG capsule capsule Take 1,000 mg by mouth Daily With Breakfast.     • triamcinolone (KENALOG) 0.1 % ointment Apply  topically to the appropriate area as directed 2 (Two) Times a Day As Needed for Irritation. 15 g 1   • vitamin B-12 (CYANOCOBALAMIN) 100 MCG tablet Take 50 mcg by mouth.     • loperamide (Imodium A-D) 2 MG tablet Take 1 tablet by mouth 4 (Four) Times a Day As Needed for Diarrhea.     • carBAMazepine (TEGretol) 200 MG tablet        No facility-administered medications prior to visit.       Patient Active Problem List   Diagnosis   • Wellness examination-done   • Concussion   • Congestive heart failure-diastolic   • Hypertension   • Fibrocystic breast disease   • Surgical menopause-see osteoporosis   • Osteoporosis 2020-prolia/2y   • *Hx anemia   • Nodule of neck-R submandibular   • Headache   • Anticoagulated prevention/ASA 81   • Laboratory test*   • Seborrhea       Advance Care Planning:  ACP discussion was held with the patient during this visit. Patient has an advance directive (not in EMR), copy requested.    Identification of Risk Factors:  Risk factors include: Breast Cancer/Mammogram Screening  Colon Cancer Screening  Immunizations Discussed/Encouraged (specific immunizations; Tdap, Influenza, Prevnar 20 (Pneumococcal 20-valent conjugate), Shingrix and COVID19 )  Osteoporosis Risk.    Review of Systems    Compared to one year ago, the patient feels her physical health is the same.  Compared to one year ago, the patient feels her mental health is the same.    GENERAL:  Active/slower with limits, speed, stamina for age and today. Sleep is ok.  "No fever now/recent.  SKIN: No rash/skin lesion of concern beyond if above.   ENDO:  No syncope, near or diaphoretic sweaty spells.  HEENT: No recent head injury; same on/off headache.   No vision change.  No significant hearing loss.  Ears without pain/drainage.  No sore throat.  No significant nasal/sinus congestion/drainage. No epistaxis.  CHEST: No chest wall tenderness or mass. No cough, without wheeze.  No SOB; no hemoptysis.  CV: No chest pain, palpitations, ankle edema.  GI: No heartburn, dysphagia.  No usual abdominal pain, diarrhea, constipation-see above.  No rectal bleeding, or melena.    :  Voids without dysuria, or incontinence to completion.  ORTHO: No painful/swollen joints but various on /off sore.  No sore neck or back.  No acute neck or back pain without recent injury.  NEURO: No dizziness, weakness of extremities.  No numbness/paresthesias.   PSYCH: No memory loss.  Mood good; not anxious, depressed but/and not suicidal.  Tries to tolerate stress .   Screening:  Mammogram: 6.4.20-ordered several times  Bone density: 7.24.20 bone d-deca/[facility] -2.6 hip -1.7/7.24.20; qualifies for [facility]  GI:   EGD-Inflammatory change of bulb/MM//[facility]/08-24-05  H pylori/[facility]/8-24-05  Colonoscopy+neg/[facility]//11-17-10/10y  Prostate: NA  Usual lab order  12m CBC, CMP, LIPID, TSH, Vit D, iron, B12, folate    Objective     Physical Exam    Vitals:    06/23/22 1310 06/23/22 1326   BP: 158/68 126/76   BP Location: Left arm Right arm   Patient Position: Sitting Sitting   Cuff Size: Adult Adult   Pulse: 78    Resp: 16    Temp: 97.1 °F (36.2 °C)    TempSrc: Infrared    SpO2: 98%    Weight: 61.2 kg (135 lb)    Height: 159.4 cm (62.75\")    PainSc: 0-No pain        BMI is within normal parameters. No other follow-up for BMI required.  GENERAL:  Well nourished/developed in no acute distress.   SKIN: Turgor excellent, without wound, rash, lesion  HEENT: Normal cephalic without trauma.  Pupils equal round reactive to " light. Extraocular motions full without nystagmus.   External canals nonobstructive nontender without reddness. Tymphatic membranes cleve with edmund structures intact.   Oral cavity without growths, exudates, and moist.  Posterior pharynx without mass, obstruction, redness.  No thyromegaly, mass, tenderness, lymphadenopathy and supple.  CV: Regular rhythm.  No murmur, gallop,  edema. Posterior pulses intact.  No carotid bruits.  CHEST: No chest wall tenderness or mass.   LUNGS: Symmetric motion with clear to auscultation.    ABD: Soft, nontender without mass.   ORTHO: Symmetric extremities without swelling/point tenderness.  Full gross range of motion.  NEURO: CN 2-12 grossly intact.  Symmetric facies and UE/LE. 3/5 strength throughout. 1/4 x bicep  equal reflexes.  Nonfocal use extremities. Speech clear. Intact light touch with monofilament, vibratory sensation with tuning fork; equal toes/distal feet.    PSYCH: Oriented x 3.  Pleasant calm, well kept.  Purposeful/directed conservation with intact short/long gross memory      Assessment & Plan   Patient Self-Management and Personalized Health Advice  The patient has been provided with information about: exercise and preventive services including:   · Annual Wellness Visit (AWV).    Visit Diagnoses:    ICD-10-CM ICD-9-CM   1. Primary hypertension  I10 401.9   2. Congestive heart failure, unspecified HF chronicity, unspecified heart failure type (HCC)  I50.9 428.0   3. Anticoagulated prevention/ASA 81  Z79.01 V58.61   4. Wellness examination-done  Z00.00 V70.0   5. Anemia, unspecified type  D64.9 285.9   6. Age related osteoporosis, unspecified pathological fracture presence  M81.0 733.01   7. Nonintractable headache, unspecified chronicity pattern, unspecified headache type  R51.9 784.0   8. Constipation, unspecified constipation type  K59.00 564.00   9. Encounter for screening mammogram for breast cancer  Z12.31 V76.12   10. Encounter for screening for malignant  neoplasm of colon  Z12.11 V76.51   11. Pain of right eye  H57.11 379.91       Orders Placed This Encounter   Procedures   • Mammo Screening Digital Tomosynthesis Bilateral With CAD     Standing Status:   Future     Scheduling Instructions:      [facility]     Order Specific Question:   Reason for Exam:     Answer:   screening   • DEXA Bone Density Axial     Standing Status:   Future     Standing Expiration Date:   6/23/2023     Scheduling Instructions:      [facility]     Order Specific Question:   Reason for Exam:     Answer:   f/u osteoporosis   • Ambulatory Referral to Gastroenterology     Referral Priority:   Routine     Referral Type:   Consultation     Referral Reason:   Specialty Services Required     Requested Specialty:   Gastroenterology     Number of Visits Requested:   1   • Ambulatory Referral to Ophthalmology     Referral Priority:   Urgent     Referral Type:   Consultation     Referral Reason:   Specialty Services Required     Requested Specialty:   Ophthalmology     Number of Visits Requested:   1       Outpatient Encounter Medications as of 6/23/2022   Medication Sig Dispense Refill   • aspirin 81 MG EC tablet Take 1 tablet by mouth Daily.     • B Complex Vitamins (B COMPLEX PO) Take 1 tablet by mouth Daily.     • BIOTIN 5000 PO Take 1 capsule by mouth Daily.     • Calcium Carb-Cholecalciferol (CALCIUM-VITAMIN D) 600-400 MG-UNIT tablet Take 1 tablet by mouth Daily.     • denosumab (Prolia) 60 MG/ML solution prefilled syringe syringe Inject 1 mL under the skin into the appropriate area as directed Every 6 (Six) Months. 1 mL 1   • lisinopril (PRINIVIL,ZESTRIL) 10 MG tablet TAKE 1 TABLET BY MOUTH DAILY 30 tablet 2   • losartan (COZAAR) 50 MG tablet TAKE 1 TABLET BY MOUTH DAILY 90 tablet 3   • Multiple Vitamins-Minerals (CENTRUM SILVER ULTRA WOMENS) tablet Take 1 tablet by mouth Daily.     • Omega-3 Fatty Acids (FISH OIL) 1000 MG capsule capsule Take 1,000 mg by mouth Daily With Breakfast.     • triamcinolone  (KENALOG) 0.1 % ointment Apply  topically to the appropriate area as directed 2 (Two) Times a Day As Needed for Irritation. 15 g 1   • vitamin B-12 (CYANOCOBALAMIN) 100 MCG tablet Take 50 mcg by mouth.     • loperamide (Imodium A-D) 2 MG tablet Take 1 tablet by mouth 4 (Four) Times a Day As Needed for Diarrhea.     • [DISCONTINUED] carBAMazepine (TEGretol) 200 MG tablet        No facility-administered encounter medications on file as of 6/23/2022.       Reviewed use of high risk medication in the elderly: not applicable  Reviewed for potential of harmful drug interactions in the elderly: not applicable    Follow Up:  Return for lab/Dr Kyle andres.     An After Visit Summary and PPPS with all of these plans were given to the patient.

## 2022-07-12 ENCOUNTER — CLINICAL SUPPORT (OUTPATIENT)
Dept: FAMILY MEDICINE CLINIC | Facility: CLINIC | Age: 87
End: 2022-07-12

## 2022-07-12 DIAGNOSIS — M81.0 OSTEOPOROSIS, POST-MENOPAUSAL: ICD-10-CM

## 2022-07-12 PROCEDURE — 96372 THER/PROPH/DIAG INJ SC/IM: CPT | Performed by: FAMILY MEDICINE

## 2022-07-12 NOTE — PROGRESS NOTES
Pt here for Prolia injection as ordered, own meds. Given in right arm as order . Pt tolerated well and no adverse reactions noted and pt left office.

## 2022-08-09 ENCOUNTER — OFFICE VISIT (OUTPATIENT)
Dept: GASTROENTEROLOGY | Facility: CLINIC | Age: 87
End: 2022-08-09

## 2022-08-09 VITALS
BODY MASS INDEX: 23.04 KG/M2 | TEMPERATURE: 97.4 F | OXYGEN SATURATION: 98 % | HEIGHT: 63 IN | WEIGHT: 130 LBS | SYSTOLIC BLOOD PRESSURE: 152 MMHG | HEART RATE: 88 BPM | DIASTOLIC BLOOD PRESSURE: 80 MMHG

## 2022-08-09 DIAGNOSIS — Z12.11 ENCOUNTER FOR SCREENING FOR MALIGNANT NEOPLASM OF COLON: Primary | ICD-10-CM

## 2022-08-09 DIAGNOSIS — Z78.9 NONSMOKER: ICD-10-CM

## 2022-08-09 DIAGNOSIS — I10 HTN (HYPERTENSION), BENIGN: ICD-10-CM

## 2022-08-09 PROCEDURE — 99204 OFFICE O/P NEW MOD 45 MIN: CPT | Performed by: CLINICAL NURSE SPECIALIST

## 2022-08-09 RX ORDER — SODIUM, POTASSIUM,MAG SULFATES 17.5-3.13G
SOLUTION, RECONSTITUTED, ORAL ORAL
Qty: 177 ML | Refills: 0 | Status: SHIPPED | OUTPATIENT
Start: 2022-08-09 | End: 2023-02-14

## 2022-08-09 NOTE — PROGRESS NOTES
Hillary Overton  1935 8/9/2022  No chief complaint on file.    Subjective   HPI  Hillary Overton is a 86 y.o. female who presents with a complaint of colonoscopy. She last had one in 2010 normal per report. NO current symptoms. NO BRBPR. No melena. No fever chills or sweats. No family hx for colon cancer.      Past Medical History:   Diagnosis Date   • Hypertension      Past Surgical History:   Procedure Laterality Date   • APPENDECTOMY     • COLONOSCOPY  11/17/2010    Normal exam repeat in 10 years   • HYSTERECTOMY         Outpatient Medications Marked as Taking for the 8/9/22 encounter (Office Visit) with Socorro Ramos APRN   Medication Sig Dispense Refill   • aspirin 81 MG EC tablet Take 1 tablet by mouth Daily.     • B Complex Vitamins (B COMPLEX PO) Take 1 tablet by mouth Daily.     • BIOTIN 5000 PO Take 1 capsule by mouth Daily.     • Calcium Carb-Cholecalciferol (CALCIUM-VITAMIN D) 600-400 MG-UNIT tablet Take 1 tablet by mouth Daily.     • denosumab (Prolia) 60 MG/ML solution prefilled syringe syringe Inject 1 mL under the skin into the appropriate area as directed Every 6 (Six) Months. 1 mL 1   • lisinopril (PRINIVIL,ZESTRIL) 10 MG tablet TAKE 1 TABLET BY MOUTH DAILY 30 tablet 2   • loperamide (Imodium A-D) 2 MG tablet Take 1 tablet by mouth 4 (Four) Times a Day As Needed for Diarrhea.     • losartan (COZAAR) 50 MG tablet TAKE 1 TABLET BY MOUTH DAILY 90 tablet 3   • Multiple Vitamins-Minerals (CENTRUM SILVER ULTRA WOMENS) tablet Take 1 tablet by mouth Daily.     • Omega-3 Fatty Acids (FISH OIL) 1000 MG capsule capsule Take 1,000 mg by mouth Daily With Breakfast.     • triamcinolone (KENALOG) 0.1 % ointment Apply  topically to the appropriate area as directed 2 (Two) Times a Day As Needed for Irritation. 15 g 1   • vitamin B-12 (CYANOCOBALAMIN) 100 MCG tablet Take 50 mcg by mouth.       Current Facility-Administered Medications for the 8/9/22 encounter (Office Visit) with Socorro Ramos  APRN   Medication Dose Route Frequency Provider Last Rate Last Admin   • denosumab (PROLIA) syringe 60 mg  60 mg Subcutaneous Q6 Months Victor Manuel Wright MD   60 mg at 22 1500     No Known Allergies  Social History     Socioeconomic History   • Marital status:    • Number of children: 2   • Years of education: 17   Tobacco Use   • Smoking status: Former Smoker     Packs/day: 0.50     Start date: 1955     Quit date: 1989     Years since quittin.7   • Smokeless tobacco: Never Used   Vaping Use   • Vaping Use: Never used   Substance and Sexual Activity   • Alcohol use: No   • Drug use: No   • Sexual activity: Defer     Family History   Problem Relation Age of Onset   • Colon cancer Neg Hx    • Colon polyps Neg Hx      Health Maintenance   Topic Date Due   • ZOSTER VACCINE (1 of 2) Never done   • Pneumococcal Vaccine 65+ (2 - PPSV23 or PCV20) 10/21/2016   • COVID-19 Vaccine (3 - Booster for Moderna series) 2021   • DXA SCAN  2022   • INFLUENZA VACCINE  10/01/2022   • ANNUAL WELLNESS VISIT  2023   • TDAP/TD VACCINES (2 - Td or Tdap) 2031     Review of Systems   Constitutional: Negative for activity change, appetite change, chills, diaphoresis, fatigue, fever and unexpected weight change.   HENT: Negative for ear pain, hearing loss, mouth sores, sore throat, trouble swallowing and voice change.    Eyes: Negative.    Respiratory: Negative for cough, choking, shortness of breath and wheezing.    Cardiovascular: Negative for chest pain and palpitations.   Gastrointestinal: Negative for abdominal pain, blood in stool, constipation, diarrhea, nausea and vomiting.   Endocrine: Negative for cold intolerance and heat intolerance.   Genitourinary: Negative for decreased urine volume, dysuria, frequency, hematuria and urgency.   Musculoskeletal: Negative for back pain, gait problem and myalgias.   Skin: Negative for color change, pallor and rash.   Allergic/Immunologic:  "Negative for food allergies and immunocompromised state.   Neurological: Negative for dizziness, tremors, seizures, syncope, weakness, light-headedness, numbness and headaches.   Hematological: Negative for adenopathy. Does not bruise/bleed easily.   Psychiatric/Behavioral: Negative for agitation and confusion. The patient is not nervous/anxious.    All other systems reviewed and are negative.    Objective   Vitals:    08/09/22 1255   BP: 152/80   Pulse: 88   Temp: 97.4 °F (36.3 °C)   SpO2: 98%   Weight: 59 kg (130 lb)   Height: 159.4 cm (62.75\")     Body mass index is 23.21 kg/m².  Physical Exam  Constitutional:       Appearance: She is well-developed.   HENT:      Head: Normocephalic and atraumatic.   Eyes:      Pupils: Pupils are equal, round, and reactive to light.   Neck:      Trachea: No tracheal deviation.   Cardiovascular:      Rate and Rhythm: Normal rate and regular rhythm.      Heart sounds: Normal heart sounds. No murmur heard.    No friction rub. No gallop.   Pulmonary:      Effort: Pulmonary effort is normal. No respiratory distress.      Breath sounds: Normal breath sounds. No wheezing or rales.   Chest:      Chest wall: No tenderness.   Abdominal:      General: Bowel sounds are normal. There is no distension.      Palpations: Abdomen is soft. Abdomen is not rigid.      Tenderness: There is no abdominal tenderness. There is no guarding or rebound.   Musculoskeletal:         General: No tenderness or deformity. Normal range of motion.      Cervical back: Normal range of motion and neck supple.   Skin:     General: Skin is warm and dry.      Coloration: Skin is not pale.      Findings: No rash.   Neurological:      Mental Status: She is alert and oriented to person, place, and time.      Deep Tendon Reflexes: Reflexes are normal and symmetric.   Psychiatric:         Behavior: Behavior normal.         Thought Content: Thought content normal.         Judgment: Judgment normal.       Assessment & Plan "   Diagnoses and all orders for this visit:    1. Encounter for screening for malignant neoplasm of colon (Primary)  -     Case Request; Standing  -     Follow Anesthesia Guidelines / Standing Orders; Future  -     Obtain Informed Consent; Future  -     Case Request  -     Suprep Bowel Prep Kit 17.5-3.13-1.6 GM/177ML solution oral solution; Take as directed by office instructions provided  Dispense: 177 mL; Refill: 0    2. HTN (hypertension), benign  Comments:  cont BP medication the day of procedure    3. Nonsmoker      COLONOSCOPY WITH ANESTHESIA (N/A)  Part of this note may be an electronic transcription/translation of spoken language to printed text using the Dragon Dictation System.  Body mass index is 23.21 kg/m².  No follow-ups on file.    BMI is within normal parameters. No other follow-up for BMI required.      All risks, benefits, alternatives, and indications of colonoscopy and/or Endoscopy procedure have been discussed with the patient. Risks to include perforation of the colon requiring possible surgery or colostomy, risk of bleeding from biopsies or removal of colon tissue, possibility of missing a colon polyp or cancer, or adverse drug reaction.  Benefits to include the diagnosis and management of disease of the colon and rectum. Alternatives to include barium enema, radiographic evaluation, lab testing or no intervention. Pt verbalizes understanding and agrees.     Socorro Ramos, APRN  8/9/2022  13:21 CDT          If you smoke or use tobacco, 4 minutes reading provided  Steps to Quit Smoking  Smoking tobacco can be harmful to your health and can affect almost every organ in your body. Smoking puts you, and those around you, at risk for developing many serious chronic diseases. Quitting smoking is difficult, but it is one of the best things that you can do for your health. It is never too late to quit.  What are the benefits of quitting smoking?  When you quit smoking, you lower your risk of  developing serious diseases and conditions, such as:  · Lung cancer or lung disease, such as COPD.  · Heart disease.  · Stroke.  · Heart attack.  · Infertility.  · Osteoporosis and bone fractures.  Additionally, symptoms such as coughing, wheezing, and shortness of breath may get better when you quit. You may also find that you get sick less often because your body is stronger at fighting off colds and infections. If you are pregnant, quitting smoking can help to reduce your chances of having a baby of low birth weight.  How do I get ready to quit?  When you decide to quit smoking, create a plan to make sure that you are successful. Before you quit:  · Pick a date to quit. Set a date within the next two weeks to give you time to prepare.  · Write down the reasons why you are quitting. Keep this list in places where you will see it often, such as on your bathroom mirror or in your car or wallet.  · Identify the people, places, things, and activities that make you want to smoke (triggers) and avoid them. Make sure to take these actions:  ¨ Throw away all cigarettes at home, at work, and in your car.  ¨ Throw away smoking accessories, such as ashtrays and lighters.  ¨ Clean your car and make sure to empty the ashtray.  ¨ Clean your home, including curtains and carpets.  · Tell your family, friends, and coworkers that you are quitting. Support from your loved ones can make quitting easier.  · Talk with your health care provider about your options for quitting smoking.  · Find out what treatment options are covered by your health insurance.  What strategies can I use to quit smoking?  Talk with your healthcare provider about different strategies to quit smoking. Some strategies include:  · Quitting smoking altogether instead of gradually lessening how much you smoke over a period of time. Research shows that quitting “cold turkey” is more successful than gradually quitting.  · Attending in-person counseling to help you  build problem-solving skills. You are more likely to have success in quitting if you attend several counseling sessions. Even short sessions of 10 minutes can be effective.  · Finding resources and support systems that can help you to quit smoking and remain smoke-free after you quit. These resources are most helpful when you use them often. They can include:  ¨ Online chats with a counselor.  ¨ Telephone quitlines.  ¨ Printed self-help materials.  ¨ Support groups or group counseling.  ¨ Text messaging programs.  ¨ Mobile phone applications.  · Taking medicines to help you quit smoking. (If you are pregnant or breastfeeding, talk with your health care provider first.) Some medicines contain nicotine and some do not. Both types of medicines help with cravings, but the medicines that include nicotine help to relieve withdrawal symptoms. Your health care provider may recommend:  ¨ Nicotine patches, gum, or lozenges.  ¨ Nicotine inhalers or sprays.  ¨ Non-nicotine medicine that is taken by mouth.  Talk with your health care provider about combining strategies, such as taking medicines while you are also receiving in-person counseling. Using these two strategies together makes you more likely to succeed in quitting than if you used either strategy on its own.  If you are pregnant or breastfeeding, talk with your health care provider about finding counseling or other support strategies to quit smoking. Do not take medicine to help you quit smoking unless told to do so by your health care provider.  What things can I do to make it easier to quit?  Quitting smoking might feel overwhelming at first, but there is a lot that you can do to make it easier. Take these important actions:  · Reach out to your family and friends and ask that they support and encourage you during this time. Call telephone quitlines, reach out to support groups, or work with a counselor for support.  · Ask people who smoke to avoid smoking around  you.  · Avoid places that trigger you to smoke, such as bars, parties, or smoke-break areas at work.  · Spend time around people who do not smoke.  · Lessen stress in your life, because stress can be a smoking trigger for some people. To lessen stress, try:  ¨ Exercising regularly.  ¨ Deep-breathing exercises.  ¨ Yoga.  ¨ Meditating.  ¨ Performing a body scan. This involves closing your eyes, scanning your body from head to toe, and noticing which parts of your body are particularly tense. Purposefully relax the muscles in those areas.  · Download or purchase mobile phone or tablet apps (applications) that can help you stick to your quit plan by providing reminders, tips, and encouragement. There are many free apps, such as QuitGuide from the CDC (Centers for Disease Control and Prevention). You can find other support for quitting smoking (smoking cessation) through smokefree.gov and other websites.  How will I feel when I quit smoking?  Within the first 24 hours of quitting smoking, you may start to feel some withdrawal symptoms. These symptoms are usually most noticeable 2-3 days after quitting, but they usually do not last beyond 2-3 weeks. Changes or symptoms that you might experience include:  · Mood swings.  · Restlessness, anxiety, or irritation.  · Difficulty concentrating.  · Dizziness.  · Strong cravings for sugary foods in addition to nicotine.  · Mild weight gain.  · Constipation.  · Nausea.  · Coughing or a sore throat.  · Changes in how your medicines work in your body.  · A depressed mood.  · Difficulty sleeping (insomnia).  After the first 2-3 weeks of quitting, you may start to notice more positive results, such as:  · Improved sense of smell and taste.  · Decreased coughing and sore throat.  · Slower heart rate.  · Lower blood pressure.  · Clearer skin.  · The ability to breathe more easily.  · Fewer sick days.  Quitting smoking is very challenging for most people. Do not get discouraged if you are  not successful the first time. Some people need to make many attempts to quit before they achieve long-term success. Do your best to stick to your quit plan, and talk with your health care provider if you have any questions or concerns.  This information is not intended to replace advice given to you by your health care provider. Make sure you discuss any questions you have with your health care provider.  Document Released: 12/12/2002 Document Revised: 08/15/2017 Document Reviewed: 05/03/2016  Elsevier Interactive Patient Education © 2017 [employer]

## 2022-09-12 ENCOUNTER — OUTSIDE FACILITY SERVICE (OUTPATIENT)
Dept: GASTROENTEROLOGY | Facility: CLINIC | Age: 87
End: 2022-09-12

## 2022-09-12 PROCEDURE — G0121 COLON CA SCRN NOT HI RSK IND: HCPCS | Performed by: INTERNAL MEDICINE

## 2022-09-21 DIAGNOSIS — I10 PRIMARY HYPERTENSION: Primary | ICD-10-CM

## 2022-09-21 RX ORDER — LISINOPRIL 10 MG/1
10 TABLET ORAL DAILY
Qty: 90 TABLET | Refills: 1 | OUTPATIENT
Start: 2022-09-21

## 2022-09-21 NOTE — TELEPHONE ENCOUNTER
Rx Refill Note  Requested Prescriptions     Pending Prescriptions Disp Refills   • lisinopril (PRINIVIL,ZESTRIL) 10 MG tablet [Pharmacy Med Name: LISINOPRIL 10MG TABLETS] 90 tablet 1     Sig: TAKE 1 TABLET BY MOUTH DAILY      Last office visit with prescribing clinician: 6/23/2022      Next office visit with prescribing clinician: 12/28/2022            Yessenia Avery LPN  09/21/22, 13:27 CDT

## 2022-09-22 RX ORDER — LISINOPRIL 10 MG/1
10 TABLET ORAL DAILY
Qty: 90 TABLET | Refills: 1 | Status: SHIPPED | OUTPATIENT
Start: 2022-09-22 | End: 2023-03-27

## 2022-09-22 NOTE — TELEPHONE ENCOUNTER
"Called and advised \"I only take one pill and it is the one I asked for a refill of\"    Stopped cozaar on med list  "

## 2022-12-21 DIAGNOSIS — D64.9 ANEMIA, UNSPECIFIED TYPE: ICD-10-CM

## 2022-12-21 DIAGNOSIS — I10 PRIMARY HYPERTENSION: Primary | ICD-10-CM

## 2022-12-21 DIAGNOSIS — Z86.2 HISTORY OF ANEMIA: ICD-10-CM

## 2022-12-21 DIAGNOSIS — E55.9 VITAMIN D DEFICIENCY: ICD-10-CM

## 2022-12-21 DIAGNOSIS — E53.8 VITAMIN B12 DEFICIENCY: ICD-10-CM

## 2022-12-22 LAB
25(OH)D3+25(OH)D2 SERPL-MCNC: 69.1 NG/ML (ref 30–100)
ALBUMIN SERPL-MCNC: 4.4 G/DL (ref 3.5–5.2)
ALBUMIN/GLOB SERPL: 1.6 G/DL
ALP SERPL-CCNC: 100 U/L (ref 39–117)
ALT SERPL-CCNC: 19 U/L (ref 1–33)
AST SERPL-CCNC: 44 U/L (ref 1–32)
BASOPHILS # BLD AUTO: 0.11 10*3/MM3 (ref 0–0.2)
BASOPHILS NFR BLD AUTO: 1.1 % (ref 0–1.5)
BILIRUB SERPL-MCNC: 0.6 MG/DL (ref 0–1.2)
BUN SERPL-MCNC: 15 MG/DL (ref 8–23)
BUN/CREAT SERPL: 20.8 (ref 7–25)
CALCIUM SERPL-MCNC: 10 MG/DL (ref 8.6–10.5)
CHLORIDE SERPL-SCNC: 98 MMOL/L (ref 98–107)
CHOLEST SERPL-MCNC: 217 MG/DL (ref 0–200)
CHOLEST/HDLC SERPL: 3.24 {RATIO}
CO2 SERPL-SCNC: 27.5 MMOL/L (ref 22–29)
CREAT SERPL-MCNC: 0.72 MG/DL (ref 0.57–1)
EGFRCR SERPLBLD CKD-EPI 2021: 82.1 ML/MIN/1.73
EOSINOPHIL # BLD AUTO: 0.52 10*3/MM3 (ref 0–0.4)
EOSINOPHIL NFR BLD AUTO: 5.4 % (ref 0.3–6.2)
ERYTHROCYTE [DISTWIDTH] IN BLOOD BY AUTOMATED COUNT: 12.6 % (ref 12.3–15.4)
FOLATE SERPL-MCNC: >20 NG/ML (ref 4.78–24.2)
GLOBULIN SER CALC-MCNC: 2.7 GM/DL
GLUCOSE SERPL-MCNC: 94 MG/DL (ref 65–99)
HCT VFR BLD AUTO: 45.1 % (ref 34–46.6)
HDLC SERPL-MCNC: 67 MG/DL (ref 40–60)
HGB BLD-MCNC: 15.2 G/DL (ref 12–15.9)
IMM GRANULOCYTES # BLD AUTO: 0.02 10*3/MM3 (ref 0–0.05)
IMM GRANULOCYTES NFR BLD AUTO: 0.2 % (ref 0–0.5)
IRON SERPL-MCNC: 153 MCG/DL (ref 37–145)
LDLC SERPL CALC-MCNC: 123 MG/DL (ref 0–100)
LYMPHOCYTES # BLD AUTO: 2.68 10*3/MM3 (ref 0.7–3.1)
LYMPHOCYTES NFR BLD AUTO: 27.9 % (ref 19.6–45.3)
MCH RBC QN AUTO: 30.8 PG (ref 26.6–33)
MCHC RBC AUTO-ENTMCNC: 33.7 G/DL (ref 31.5–35.7)
MCV RBC AUTO: 91.5 FL (ref 79–97)
MONOCYTES # BLD AUTO: 0.76 10*3/MM3 (ref 0.1–0.9)
MONOCYTES NFR BLD AUTO: 7.9 % (ref 5–12)
NEUTROPHILS # BLD AUTO: 5.52 10*3/MM3 (ref 1.7–7)
NEUTROPHILS NFR BLD AUTO: 57.5 % (ref 42.7–76)
NRBC BLD AUTO-RTO: 0 /100 WBC (ref 0–0.2)
PLATELET # BLD AUTO: 331 10*3/MM3 (ref 140–450)
POTASSIUM SERPL-SCNC: 4.8 MMOL/L (ref 3.5–5.2)
PROT SERPL-MCNC: 7.1 G/DL (ref 6–8.5)
RBC # BLD AUTO: 4.93 10*6/MM3 (ref 3.77–5.28)
SODIUM SERPL-SCNC: 138 MMOL/L (ref 136–145)
TRIGL SERPL-MCNC: 157 MG/DL (ref 0–150)
TSH SERPL DL<=0.005 MIU/L-ACNC: 1.73 UIU/ML (ref 0.27–4.2)
VIT B12 SERPL-MCNC: 1095 PG/ML (ref 211–946)
VLDLC SERPL CALC-MCNC: 27 MG/DL (ref 5–40)
WBC # BLD AUTO: 9.61 10*3/MM3 (ref 3.4–10.8)

## 2022-12-23 DIAGNOSIS — M81.0 OSTEOPOROSIS, UNSPECIFIED OSTEOPOROSIS TYPE, UNSPECIFIED PATHOLOGICAL FRACTURE PRESENCE: ICD-10-CM

## 2022-12-27 RX ORDER — DENOSUMAB 60 MG/ML
INJECTION SUBCUTANEOUS
Qty: 1 ML | Refills: 1 | Status: SHIPPED | OUTPATIENT
Start: 2022-12-27

## 2022-12-27 NOTE — TELEPHONE ENCOUNTER
Rx Refill Note  Requested Prescriptions     Pending Prescriptions Disp Refills   • Prolia 60 MG/ML solution prefilled syringe syringe [Pharmacy Med Name: Prolia 60 MG/ML Subcutaneous Solution Prefilled Syringe] 1 mL 1     Sig: INJECT 60MG SUBCUTANEOUSLY  EVERY 6 MONTHS      Last office visit with prescribing clinician: 6/23/2022      Next office visit with prescribing clinician: 12/28/2022            Penelope Worthy MA  12/27/22, 11:24 CST

## 2022-12-28 ENCOUNTER — OFFICE VISIT (OUTPATIENT)
Dept: FAMILY MEDICINE CLINIC | Facility: CLINIC | Age: 87
End: 2022-12-28

## 2022-12-28 VITALS
WEIGHT: 135 LBS | HEART RATE: 87 BPM | SYSTOLIC BLOOD PRESSURE: 130 MMHG | OXYGEN SATURATION: 97 % | DIASTOLIC BLOOD PRESSURE: 78 MMHG | BODY MASS INDEX: 23.92 KG/M2 | RESPIRATION RATE: 14 BRPM | HEIGHT: 63 IN

## 2022-12-28 DIAGNOSIS — Z79.01 ANTICOAGULATED: ICD-10-CM

## 2022-12-28 DIAGNOSIS — I10 PRIMARY HYPERTENSION: Chronic | ICD-10-CM

## 2022-12-28 DIAGNOSIS — I50.9 CONGESTIVE HEART FAILURE, UNSPECIFIED HF CHRONICITY, UNSPECIFIED HEART FAILURE TYPE: ICD-10-CM

## 2022-12-28 DIAGNOSIS — Z23 NEED FOR VACCINATION: Primary | ICD-10-CM

## 2022-12-28 DIAGNOSIS — R79.0 ABNORMAL BLOOD LEVEL OF IRON: ICD-10-CM

## 2022-12-28 DIAGNOSIS — D64.9 ANEMIA, UNSPECIFIED TYPE: ICD-10-CM

## 2022-12-28 DIAGNOSIS — M81.0 AGE RELATED OSTEOPOROSIS, UNSPECIFIED PATHOLOGICAL FRACTURE PRESENCE: ICD-10-CM

## 2022-12-28 DIAGNOSIS — L98.9 SKIN LESION: ICD-10-CM

## 2022-12-28 PROCEDURE — G0009 ADMIN PNEUMOCOCCAL VACCINE: HCPCS | Performed by: FAMILY MEDICINE

## 2022-12-28 PROCEDURE — 99214 OFFICE O/P EST MOD 30 MIN: CPT | Performed by: FAMILY MEDICINE

## 2022-12-28 PROCEDURE — 90677 PCV20 VACCINE IM: CPT | Performed by: FAMILY MEDICINE

## 2022-12-28 NOTE — PROGRESS NOTES
Subjective   Hillary Overton is a 87 y.o. female presenting with chief complaint of:   Chief Complaint   Patient presents with   • Hypertension     6 mo f/u   • Suspicious Skin Lesion     Right front shoulder     AWV 6.23.22    History of Present Illness :  Alone.   Here for review of chronic problems that includes HTN and others.      Has multiple chronic problems to consider that might have a bearing on today's issues;  an interval appointment.       Chronic/acute problems reviewed today:   1. Congestive heart failure, unspecified HF chronicity, unspecified heart failure type (HCC) Chronic/stable.  Denies significant sob, orthopnea, leg edema, weight gain.  Aware of influence diet/salt and watching weight at home.       2. Primary hypertension Chronic/stable. Stable here past/no recent home blood pressures.  No significant chest pain, SOB, LE edema, orthopnea, near syncope, dizziness/light headness.   Recent Vitals       6/23/2022 8/9/2022 12/28/2022       BP: 126/76 152/80 130/78     Pulse: -- 88 87     Temp: -- 97.4 °F (36.3 °C) --     Weight: -- 59 kg (130 lb) 61.2 kg (135 lb)     BMI (Calculated): -- 23.2 24.1            3. Anticoagulated prevention/ASA 81 Chronic/stable reason for stopping or use of.  Denies bleeding issues; especially epistaxis, melena, hematochezia.  Upper arms/others do not significantly bruise easily.  No significant bleeding or falls.      4. Anemia, unspecified type Chronic or past history/stable: This has been present before.    There has been GI evaulation in the past. There is no current melena, hematochezia. It has been benign to date and stable/watching.  Contributing comorbidities to date: benign.     5. Age related osteoporosis, unspecified pathological fracture presence Chronic/stable.  Last bone density/if below.   Has had boniva and currently on prolia Rx.  Ok further bone density screening when due.      6. Abnormal blood level of iron she does not think she is taking any iron  for her iron levels, I   7. Skin lesion lesion on anterior right shoulder for greater than 6 months.  She does not think it is growing.  Is no pain involved     Has an/another acute issue today: none.    The following portions of the patient's history were reviewed and updated as appropriate: allergies, current medications, past family history, past medical history, past social history, past surgical history and problem list.    Current Outpatient Medications:   •  aspirin 81 MG EC tablet, Take 1 tablet by mouth Daily., Disp: , Rfl:   •  B Complex Vitamins (B COMPLEX PO), Take 1 tablet by mouth Daily., Disp: , Rfl:   •  BIOTIN 5000 PO, Take 1 capsule by mouth Daily., Disp: , Rfl:   •  Calcium Carb-Cholecalciferol (CALCIUM-VITAMIN D) 600-400 MG-UNIT tablet, Take 1 tablet by mouth Daily., Disp: , Rfl:   •  lisinopril (PRINIVIL,ZESTRIL) 10 MG tablet, Take 1 tablet by mouth Daily., Disp: 90 tablet, Rfl: 1  •  loperamide (Imodium A-D) 2 MG tablet, Take 1 tablet by mouth 4 (Four) Times a Day As Needed for Diarrhea., Disp: , Rfl:   •  Multiple Vitamins-Minerals (CENTRUM SILVER ULTRA WOMENS) tablet, Take 1 tablet by mouth Daily., Disp: , Rfl:   •  Omega-3 Fatty Acids (FISH OIL) 1000 MG capsule capsule, Take 1,000 mg by mouth Daily With Breakfast., Disp: , Rfl:   •  Prolia 60 MG/ML solution prefilled syringe syringe, INJECT 60MG SUBCUTANEOUSLY  EVERY 6 MONTHS, Disp: 1 mL, Rfl: 1  •  Suprep Bowel Prep Kit 17.5-3.13-1.6 GM/177ML solution oral solution, Take as directed by office instructions provided, Disp: 177 mL, Rfl: 0  •  triamcinolone (KENALOG) 0.1 % ointment, Apply  topically to the appropriate area as directed 2 (Two) Times a Day As Needed for Irritation., Disp: 15 g, Rfl: 1  •  vitamin B-12 (CYANOCOBALAMIN) 100 MCG tablet, Take 50 mcg by mouth., Disp: , Rfl:     Current Facility-Administered Medications:   •  denosumab (PROLIA) syringe 60 mg, 60 mg, Subcutaneous, Q6 Months, Victor Manuel Wright MD, 60 mg at 07/12/22  1500    No problems with medications.    No Known Allergies    Review of Systems  GENERAL:  Active/slower with limits, speed, stamina for age and today. Sleep is ok. No fever now/recent.  SKIN: No rash/skin lesion of concern beyond if above.   ENDO:  No syncope, near or diaphoretic sweaty spells.  HEENT: No recent head injury; same on/off headache.   No vision change.  No significant hearing loss.  Ears without pain/drainage.  No sore throat.  No significant nasal/sinus congestion/drainage. No epistaxis.  CHEST: No chest wall tenderness or mass. No cough, without wheeze.  No SOB; no hemoptysis.  CV: No chest pain, palpitations, ankle edema.  GI: No heartburn, dysphagia.  No usual abdominal pain, diarrhea, constipation-see above.  No rectal bleeding, or melena.    :  Voids without dysuria, or incontinence to completion.  ORTHO: No painful/swollen joints but various on /off sore.  No sore neck or back.  No acute neck or back pain without recent injury.  NEURO: No dizziness, weakness of extremities.  No numbness/paresthesias.   PSYCH: No memory loss.  Mood good; not anxious, depressed but/and not suicidal.  Tries to tolerate stress .   Screening:  Mammogram: 8.16.22 [facility]  Bone density:   8.16.22/Bone d-deca/[facility]/LS -1.3 hip -1.5/8.16.22-prolia  7.24.20 bone d-deca/[facility]/LS -2.6 hip -1.7/7.24.20; qualifies for boniva  GI:   EGD-Inflammatory change of bulb/[facility]//[facility]/08-24-05  H pylori/[facility]ari/8-24-05  Colon-div/Mc/[facility]/9.12.22/prn  Prostate: NA  Usual lab order  12m CBC, CMP, LIPID, TSH, Vit D, iron, B12, folate    Copy/paste function used for ROS/exam AND each area of these were reviewed, updated, confirmed and supplemented as needed.  Data reviewed:   Recent admit/ER/MD visits: 6.23.22    1. Primary hypertension    2. Congestive heart failure, unspecified HF chronicity, unspecified heart failure type (HCC)    3. Anticoagulated prevention/ASA 81    4. Wellness examination-done    5. Anemia, unspecified type    6. Age  related osteoporosis, unspecified pathological fracture presence    7. Nonintractable headache, unspecified chronicity pattern, unspecified headache type    8. Constipation, unspecified constipation type    9. Encounter for screening mammogram for breast cancer    10. Encounter for screening for malignant neoplasm of colon    11. Pain of right eye          Discussions/medical decisions/reviews:  BP ok  Other vitals ok  DM/BS ok  Lipid ok  PSA NA  CBC ok  Renal ok  Liver ok  Vit D ok  Thyroid ok     Helping to move up Dr Castro apt for August due to new eye pain (making HA seem better)  Time redo bone density; agrees mammogram this year  Back to GI; constipation and 2y behind colonscopy    Last cardiac testing:   Echo: none    Radiology considered:   No radiology results for the last 90 days.    Lab Results:  Results for orders placed or performed in visit on 12/21/22   Comprehensive Metabolic Panel    Specimen: Blood   Result Value Ref Range    Glucose 94 65 - 99 mg/dL    BUN 15 8 - 23 mg/dL    Creatinine 0.72 0.57 - 1.00 mg/dL    EGFR Result 82.1 >60.0 mL/min/1.73    BUN/Creatinine Ratio 20.8 7.0 - 25.0    Sodium 138 136 - 145 mmol/L    Potassium 4.8 3.5 - 5.2 mmol/L    Chloride 98 98 - 107 mmol/L    Total CO2 27.5 22.0 - 29.0 mmol/L    Calcium 10.0 8.6 - 10.5 mg/dL    Total Protein 7.1 6.0 - 8.5 g/dL    Albumin 4.40 3.50 - 5.20 g/dL    Globulin 2.7 gm/dL    A/G Ratio 1.6 g/dL    Total Bilirubin 0.6 0.0 - 1.2 mg/dL    Alkaline Phosphatase 100 39 - 117 U/L    AST (SGOT) 44 (H) 1 - 32 U/L    ALT (SGPT) 19 1 - 33 U/L   Lipid Panel With / Chol / HDL Ratio    Specimen: Blood   Result Value Ref Range    Total Cholesterol 217 (H) 0 - 200 mg/dL    Triglycerides 157 (H) 0 - 150 mg/dL    HDL Cholesterol 67 (H) 40 - 60 mg/dL    VLDL Cholesterol Bruce 27 5 - 40 mg/dL    LDL Chol Calc (NIH) 123 (H) 0 - 100 mg/dL    Chol/HDL Ratio 3.24    TSH    Specimen: Blood   Result Value Ref Range    TSH 1.730 0.270 - 4.200 uIU/mL   Vitamin  D,25-Hydroxy    Specimen: Blood   Result Value Ref Range    25 Hydroxy, Vitamin D 69.1 30.0 - 100.0 ng/ml   Iron    Specimen: Blood   Result Value Ref Range    Iron 153 (H) 37 - 145 mcg/dL   Vitamin B12    Specimen: Blood   Result Value Ref Range    Vitamin B-12 1,095 (H) 211 - 946 pg/mL   Folate    Specimen: Blood   Result Value Ref Range    Folate >20.00 4.78 - 24.20 ng/mL   CBC & Differential    Specimen: Blood   Result Value Ref Range    WBC 9.61 3.40 - 10.80 10*3/mm3    RBC 4.93 3.77 - 5.28 10*6/mm3    Hemoglobin 15.2 12.0 - 15.9 g/dL    Hematocrit 45.1 34.0 - 46.6 %    MCV 91.5 79.0 - 97.0 fL    MCH 30.8 26.6 - 33.0 pg    MCHC 33.7 31.5 - 35.7 g/dL    RDW 12.6 12.3 - 15.4 %    Platelets 331 140 - 450 10*3/mm3    Neutrophil Rel % 57.5 42.7 - 76.0 %    Lymphocyte Rel % 27.9 19.6 - 45.3 %    Monocyte Rel % 7.9 5.0 - 12.0 %    Eosinophil Rel % 5.4 0.3 - 6.2 %    Basophil Rel % 1.1 0.0 - 1.5 %    Neutrophils Absolute 5.52 1.70 - 7.00 10*3/mm3    Lymphocytes Absolute 2.68 0.70 - 3.10 10*3/mm3    Monocytes Absolute 0.76 0.10 - 0.90 10*3/mm3    Eosinophils Absolute 0.52 (H) 0.00 - 0.40 10*3/mm3    Basophils Absolute 0.11 0.00 - 0.20 10*3/mm3    Immature Granulocyte Rel % 0.2 0.0 - 0.5 %    Immature Grans Absolute 0.02 0.00 - 0.05 10*3/mm3    nRBC 0.0 0.0 - 0.2 /100 WBC       A1C:No results for input(s): HGBA1C in the last 19094 hours.  GLUCOSE:  Lab Results - Last 18 Months   Lab Units 12/21/22  1348 06/21/22  1000   GLUCOSE mg/dL 94 105*     LIPID:  Lab Results - Last 18 Months   Lab Units 12/21/22  1348 06/21/22  1000   CHOLESTEROL mg/dL 217* 159   LDL CHOL mg/dL 123* 69   HDL CHOL mg/dL 67* 75*   TRIGLYCERIDES mg/dL 157* 80     PSA:No results for input(s): PSA in the last 17134 hours.    CBC:  Lab Results - Last 18 Months   Lab Units 12/21/22  1348 06/21/22  1000   WBC 10*3/mm3 9.61 12.51*   HEMOGLOBIN g/dL 15.2 14.2   HEMATOCRIT % 45.1 40.7   PLATELETS 10*3/mm3 331 293   IRON mcg/dL 153* 83      BMP/CMP:  Lab  "Results - Last 18 Months   Lab Units 12/21/22  1348 06/21/22  1000   SODIUM mmol/L 138 132*   POTASSIUM mmol/L 4.8 4.4   CHLORIDE mmol/L 98 95*   TOTAL CO2 mmol/L 27.5 26.7   GLUCOSE mg/dL 94 105*   BUN mg/dL 15 13   CREATININE mg/dL 0.72 0.70   EGFR RESULT mL/min/1.73 82.1 85.4   CALCIUM mg/dL 10.0 9.4     HEPATIC:  Lab Results - Last 18 Months   Lab Units 12/21/22  1348 06/21/22  1000   ALT (SGPT) U/L 19 25   AST (SGOT) U/L 44* 57*   ALK PHOS U/L 100 112     Vit D:  Lab Results - Last 18 Months   Lab Units 12/21/22  1348 06/21/22  1000   VIT D 25 HYDROXY ng/ml 69.1 65.9     THYROID:  Lab Results - Last 18 Months   Lab Units 12/21/22  1348 06/21/22  1000   TSH uIU/mL 1.730 1.100     Objective   /78   Pulse 87   Resp 14   Ht 159.4 cm (62.75\")   Wt 61.2 kg (135 lb)   SpO2 97%   BMI 24.11 kg/m²   Body mass index is 24.11 kg/m².    Recent Vitals       6/23/2022 6/23/2022 8/9/2022       BP: 158/68 126/76 152/80     Pulse: 78 -- 88     Temp: 97.1 °F (36.2 °C) -- 97.4 °F (36.3 °C)     Weight: 61.2 kg (135 lb) -- 59 kg (130 lb)     BMI (Calculated): 24.1 -- 23.2         Wt Readings from Last 15 Encounters:   12/28/22 1316 61.2 kg (135 lb)   08/09/22 1255 59 kg (130 lb)   06/23/22 1310 61.2 kg (135 lb)   06/21/22 1400 62.3 kg (137 lb 6.4 oz)   06/23/21 1257 60.8 kg (134 lb)   06/23/20 1327 60.8 kg (134 lb)   05/10/18 1359 57.2 kg (126 lb)   05/09/17 1405 62.6 kg (138 lb)       Physical Exam  GENERAL:  Well nourished/developed in no acute distress.   SKIN: Turgor excellent, without wound, rash, lesion beyond PHOTO  HEENT: Normal cephalic without trauma.  Pupils equal round reactive to light. Extraocular motions full without nystagmus.   External canals nonobstructive nontender without reddness. Tymphatic membranes cleve with edmund structures intact.   Oral cavity without growths, exudates, and moist.  Posterior pharynx without mass, obstruction, redness.  No thyromegaly, mass, tenderness, lymphadenopathy and " "supple.  CV: Regular rhythm.  No murmur, gallop,  edema. Posterior pulses intact.  No carotid bruits.  CHEST: No chest wall tenderness or mass.   LUNGS: Symmetric motion with clear to auscultation.    ABD: Soft, nontender without mass.   ORTHO: Symmetric extremities without swelling/point tenderness.  Full gross range of motion.  NEURO: CN 2-12 grossly intact.  Symmetric facies and UE/LE. 3/5 strength throughout. 1/4 x bicep  equal reflexes.  Nonfocal use extremities. Speech clear. Intact light touch with monofilament, vibratory sensation with tuning fork; equal toes/distal feet.    PSYCH: Oriented x 3.  Pleasant calm, well kept.  Purposeful/directed conservation with intact short/long gross memory          Assessment & Plan     1. Congestive heart failure, unspecified HF chronicity, unspecified heart failure type (HCC)    2. Primary hypertension    3. Anticoagulated prevention/ASA 81    4. Anemia, unspecified type    5. Age related osteoporosis, unspecified pathological fracture presence    6. Abnormal blood level of iron    7. Skin lesion        Data review above:   Discussions/medical decisions/reviews:  BP ok  Other vitals ok  DM/BS 94 12.21.22  Lipid  12.21.22; lifestyle  PSA NA  CBC ok 12.21.22  Iron 153H 12.21.22  Renal ok 12.21.22  Liver AST 44/same 12.21.22  Vit D 69 12.21.22  Thyroid TSH ok 12.21.22    Stop any iron; too high  Bone density a lot better; continue prolia  Punch bx offered  Dr Castro found nothing wrong eye; got her to have \"MRI\" [facility]; we never got results (will reach out for results)    Data review above:   Rx: reviewed and decisions:   Rx new/changes:   No orders of the defined types were placed in this encounter.    Orders placed:   LAB/Testing/Referrals: reviewed/orders:   Today:   No orders of the defined types were placed in this encounter.    Chronic/recurrent labs above or change to:   Same    Screening reviewed/updated     Immunization History   Administered Date(s) " "Administered   • COVID-19 (MODERNA) 1st, 2nd, 3rd Dose Only 02/25/2021, 03/25/2021   • Fluzone High Dose =>65 Years ([employer] ONLY) 11/07/2019   • Fluzone Quad >6mos (Multi-dose) 11/23/2016, 10/31/2017   • Influenza Quad Vaccine (Inpatient) 10/31/2017   • Influenza, Unspecified 10/05/2022   • PEDS-Pneumococcal Conjugate (PCV7) 10/09/2009   • Pneumococcal Conjugate 13-Valent (PCV13) 10/21/2015   • Pneumococcal, Unspecified 10/09/2009   • Tdap 06/23/2021     Vaccine reviewed: today none; later we advised/reaffirmed our support/suggestion for staying complete with covid- covid boosters, seasonal flu/yearly and any missing vaccine from list we supplied; we suggest contact with local health department office to review missing/needed vaccines and then bring nursing documentation for these vaccines to this office.     Health maintenance:   Body mass index is 24.11 kg/m².  BMI is within normal parameters. No other follow-up for BMI required.      Tobacco use reviewed:   Hillary Overton  reports that she quit smoking about 33 years ago. Her smoking use included cigarettes. She started smoking about 67 years ago. She smoked an average of .5 packs per day. She has never used smokeless tobacco..       Patient Instructions     Medicare/insurances offer certain visits called \"wellness/annual\" that allows for time to deal with and  review the many aspects of \"being well\" that just might not get mentioned during other visits with your doctor through the year.  This includes things like reviews of health screenings (mammograms, various labs),  weight, exercise, vaccines for just a few examples.      In order to help you with this we wish to make you aware of a few things for you to consider:    1. Advanced directives.  These are documents used to help direct your care if your health/situation should reach a point that you cannot make your own decisions.  While it is likely you do not currently have a need for these documents now; it is " something that we all might face at any time.   The hand outs you are being given today are simply for you to review and use to learn more about these documents and consider them as you wish.      2. Vaccines: Certain vaccines are important after age 50, 60, and 65 and some health situations (for example COPD), require even boosters beyond age 65.  We are happy to review with you your vaccine status and vaccines that might be needed for you at this point:      a. Tetanus.   Like anyone this needs to given every 10 years; sooner for/with lacerations/wounds.   Likely when getting this booster it needs to be a tetanus called Tdap (tetanus mixed with diptheria and pertussis).   Years ago you had this vaccine.  We now know we can lose our immunity to pertussis (a part of this vaccine) and run a risk of catching this.  Now only would this make us ill; but more importantly we can spread this to very young children (and for them it can be a much more dangerous illness).   We call this the grandparent vaccine for this reason.     b. Pneumonia (strept).   This comes now with two brands.   It is recommended to take pneumovax first; and a year later take the cousin prevnar.  Even if you have had these before; we need to review when and your current health situation/s as you may need boosters and even recently the CDC has made recent/new recommendations for pneumovax.      c. Shingles.  You do not want to catch shingles.  Though you will recover from this; the pain associated with shingles can be severe.  Even if you have had the now older zostavax, or have had shingles; it is recommended you still get the Shingrix (the new vaccine just available early 2018 shingles vaccine).  A new shingles vaccine (a shot to lower your chance of catching shingles) is now available (shingrix).  This vaccine is the second vaccine created for this purpose; (we have had zostavax for years).  Shingrix provides a much better and longer immunity for  shingles than zostavax.  For this and other reasons Shingrix can be started at age 50.  If you have had zostavax in the past; you can still take Shingrix.      This vaccine is not paid for in a doctor's office by medicare, medicaid and probably most insurances.  Like zostavax; this is covered in drug stores.  This is a vaccine that if you chose to get you need to get at a drug store that gives vaccines (like [facility], [facility] and [employer] [facility].      d. Yearly flu vaccine given from September through April each year (there is a special vaccine for those over 65).     e. Travel vaccines:  If you are one to do international travel; be sure and ask us for any particular unusual vaccines you may need.     f.  Miscellaneous:  If you have certain health situations/disease you may need specific/particular vaccines not give to the general public.     g.  Covid: currently recommended everyone over 5.  The brands Pfizer/Moderna are for 3 total shots as immunity will wane from less than this.  YesWeAd/[employer]ber has a version that comes with recommendations for an initial vaccine and booster after.  I no longer recommend J&J as a first choice as Pfizer/Moderna are readily available.  If you have had an initial J&J I recommend you booster with Moderna.   I strongly recommend covid vaccination; being unvaccinated or partially vaccinated carries real risk for disease and even death.     Because of many restrictions on this office always having all the above vaccines; you may be advised to work with your local health department to keep up with your individual vaccine needs.    The vaccines we have on record for you include:   Immunization History   Administered Date(s) Administered   • COVID-19 (MODERNA) 1st, 2nd, 3rd Dose Only 02/25/2021, 03/25/2021   • Fluzone High Dose =>65 Years ([employer] ONLY) 11/07/2019   • Fluzone Quad >6mos (Multi-dose) 11/23/2016, 10/31/2017   • Influenza Quad Vaccine (Inpatient) 10/31/2017    • PEDS-Pneumococcal Conjugate (PCV7) 10/09/2009   • Pneumococcal Conjugate 13-Valent (PCV13) 10/21/2015   • Pneumococcal, Unspecified 10/09/2009   • Tdap 06/23/2021       If you have record of other vaccines and want them to show in your chart here; please talk to our nurses about having your vaccine record updated.     3. Exercise: regular cardio exercise something everyone should consider and try to do; even if health limitations (ie find that exercise UE/LE/cardio that they can tolerate).   Normal weight a goal for everyone (as we discussed)    4. Healthy diet helpful for weight management, illness prevention.     5. If over 50-screening exams include men PSA/rectal exam, women mammograms, and everyone colonoscopy screening for colon cancer.    6. If you use tobacco of any kind or e-products you should stop. We are providing you some information to consider that could make this process easier.      ##################################              Visit today involved chronic significant medical problems or differentials and/or intensive drug monitoring: ie potential to cause serious morbidity or death:     Follow up: Return for nonemergent skin day; then lab/Dr Wright after 6.24.22.  Future Appointments   Date Time Provider Department Center   2/14/2023  2:15 PM Victor Manuel Wright MD [facility]   6/21/2023  8:15 AM [facility]   6/28/2023 11:30 AM Victor Manuel Wright MD [facility]

## 2022-12-28 NOTE — PATIENT INSTRUCTIONS
"Medicare/insurances offer certain visits called \"wellness/annual\" that allows for time to deal with and  review the many aspects of \"being well\" that just might not get mentioned during other visits with your doctor through the year.  This includes things like reviews of health screenings (mammograms, various labs),  weight, exercise, vaccines for just a few examples.      In order to help you with this we wish to make you aware of a few things for you to consider:    1. Advanced directives.  These are documents used to help direct your care if your health/situation should reach a point that you cannot make your own decisions.  While it is likely you do not currently have a need for these documents now; it is something that we all might face at any time.   The hand outs you are being given today are simply for you to review and use to learn more about these documents and consider them as you wish.      2. Vaccines: Certain vaccines are important after age 50, 60, and 65 and some health situations (for example COPD), require even boosters beyond age 65.  We are happy to review with you your vaccine status and vaccines that might be needed for you at this point:      a. Tetanus.   Like anyone this needs to given every 10 years; sooner for/with lacerations/wounds.   Likely when getting this booster it needs to be a tetanus called Tdap (tetanus mixed with diptheria and pertussis).   Years ago you had this vaccine.  We now know we can lose our immunity to pertussis (a part of this vaccine) and run a risk of catching this.  Now only would this make us ill; but more importantly we can spread this to very young children (and for them it can be a much more dangerous illness).   We call this the grandparent vaccine for this reason.     b. Pneumonia (strept).   This comes now with two brands.   It is recommended to take pneumovax first; and a year later take the cousin prevnar.  Even if you have had these before; we need to " review when and your current health situation/s as you may need boosters and even recently the CDC has made recent/new recommendations for pneumovax.      c. Shingles.  You do not want to catch shingles.  Though you will recover from this; the pain associated with shingles can be severe.  Even if you have had the now older zostavax, or have had shingles; it is recommended you still get the Shingrix (the new vaccine just available early 2018 shingles vaccine).  A new shingles vaccine (a shot to lower your chance of catching shingles) is now available (shingrix).  This vaccine is the second vaccine created for this purpose; (we have had zostavax for years).  Shingrix provides a much better and longer immunity for shingles than zostavax.  For this and other reasons Shingrix can be started at age 50.  If you have had zostavax in the past; you can still take Shingrix.      This vaccine is not paid for in a doctor's office by medicare, medicaid and probably most insurances.  Like zostavax; this is covered in drug stores.  This is a vaccine that if you chose to get you need to get at a drug store that gives vaccines (like [facility], [facility] and [facility].      d. Yearly flu vaccine given from September through April each year (there is a special vaccine for those over 65).     e. Travel vaccines:  If you are one to do international travel; be sure and ask us for any particular unusual vaccines you may need.     f.  Miscellaneous:  If you have certain health situations/disease you may need specific/particular vaccines not give to the general public.     g.  Covid: currently recommended everyone over 5.  The brands Pfizer/Moderna are for 3 total shots as immunity will wane from less than this.  Eber/Eber has a version that comes with recommendations for an initial vaccine and booster after.  I no longer recommend J&J as a first choice as Pfizer/Moderna are readily available.  If you have had an  initial J&J I recommend you booster with Moderna.   I strongly recommend covid vaccination; being unvaccinated or partially vaccinated carries real risk for disease and even death.     Because of many restrictions on this office always having all the above vaccines; you may be advised to work with your local health department to keep up with your individual vaccine needs.    The vaccines we have on record for you include:   Immunization History   Administered Date(s) Administered    COVID-19 (MODERNA) 1st, 2nd, 3rd Dose Only 02/25/2021, 03/25/2021    Fluzone High Dose =>65 Years ([facility] ONLY) 11/07/2019    Fluzone Quad >6mos (Multi-dose) 11/23/2016, 10/31/2017    Influenza Quad Vaccine (Inpatient) 10/31/2017    PEDS-Pneumococcal Conjugate (PCV7) 10/09/2009    Pneumococcal Conjugate 13-Valent (PCV13) 10/21/2015    Pneumococcal, Unspecified 10/09/2009    Tdap 06/23/2021       If you have record of other vaccines and want them to show in your chart here; please talk to our nurses about having your vaccine record updated.     3. Exercise: regular cardio exercise something everyone should consider and try to do; even if health limitations (ie find that exercise UE/LE/cardio that they can tolerate).   Normal weight a goal for everyone (as we discussed)    4. Healthy diet helpful for weight management, illness prevention.     5. If over 50-screening exams include men PSA/rectal exam, women mammograms, and everyone colonoscopy screening for colon cancer.    6. If you use tobacco of any kind or e-products you should stop. We are providing you some information to consider that could make this process easier.      ##################################

## 2023-01-06 ENCOUNTER — CLINICAL SUPPORT (OUTPATIENT)
Dept: FAMILY MEDICINE CLINIC | Facility: CLINIC | Age: 88
End: 2023-01-06
Payer: MEDICARE

## 2023-01-06 DIAGNOSIS — M81.0 AGE RELATED OSTEOPOROSIS, UNSPECIFIED PATHOLOGICAL FRACTURE PRESENCE: ICD-10-CM

## 2023-01-06 PROCEDURE — 96372 THER/PROPH/DIAG INJ SC/IM: CPT | Performed by: FAMILY MEDICINE

## 2023-01-06 NOTE — PROGRESS NOTES
Patient here for Prolia injection, own meds, was given Left upper arm per request. Patient tolerated well and left the office.

## 2023-02-14 ENCOUNTER — PROCEDURE VISIT (OUTPATIENT)
Dept: FAMILY MEDICINE CLINIC | Facility: CLINIC | Age: 88
End: 2023-02-14
Payer: MEDICARE

## 2023-02-14 VITALS
SYSTOLIC BLOOD PRESSURE: 124 MMHG | WEIGHT: 135 LBS | RESPIRATION RATE: 16 BRPM | OXYGEN SATURATION: 98 % | DIASTOLIC BLOOD PRESSURE: 68 MMHG | BODY MASS INDEX: 23.92 KG/M2 | HEART RATE: 86 BPM | HEIGHT: 63 IN | TEMPERATURE: 96.8 F

## 2023-02-14 DIAGNOSIS — L98.9 SKIN LESION: Primary | ICD-10-CM

## 2023-02-14 PROCEDURE — 11104 PUNCH BX SKIN SINGLE LESION: CPT | Performed by: FAMILY MEDICINE

## 2023-02-14 NOTE — PROGRESS NOTES
Subjective   Hillary Overton is a 87 y.o. female presenting with chief complaint of:   Chief Complaint   Patient presents with   • Skin Lesion     R shoulder, front        History of Present Illness :  Alone.    Has a derm lesion R shoulder.   Has had this > 6m.   It is  growing.  It is not sore.  Treatment is requested from last visit.     Other chronic problem/s to consider: none.    Has an acute issue today:none    The following portions of the patient's history were reviewed and updated as appropriate: allergies, current medications, past family history, past medical history, past social history, past surgical history and problem list.      Current Outpatient Medications:   •  aspirin 81 MG EC tablet, Take 1 tablet by mouth Daily., Disp: , Rfl:   •  B Complex Vitamins (B COMPLEX PO), Take 1 tablet by mouth Daily., Disp: , Rfl:   •  BIOTIN 5000 PO, Take 1 capsule by mouth Daily., Disp: , Rfl:   •  Calcium Carb-Cholecalciferol (CALCIUM-VITAMIN D) 600-400 MG-UNIT tablet, Take 1 tablet by mouth Daily., Disp: , Rfl:   •  lisinopril (PRINIVIL,ZESTRIL) 10 MG tablet, Take 1 tablet by mouth Daily., Disp: 90 tablet, Rfl: 1  •  Multiple Vitamins-Minerals (CENTRUM SILVER ULTRA WOMENS) tablet, Take 1 tablet by mouth Daily., Disp: , Rfl:   •  Omega-3 Fatty Acids (FISH OIL) 1000 MG capsule capsule, Take 1,000 mg by mouth Daily With Breakfast., Disp: , Rfl:   •  Prolia 60 MG/ML solution prefilled syringe syringe, INJECT 60MG SUBCUTANEOUSLY  EVERY 6 MONTHS, Disp: 1 mL, Rfl: 1  •  vitamin B-12 (CYANOCOBALAMIN) 100 MCG tablet, Take 50 mcg by mouth., Disp: , Rfl:   •  loperamide (Imodium A-D) 2 MG tablet, Take 1 tablet by mouth 4 (Four) Times a Day As Needed for Diarrhea., Disp: , Rfl:   •  triamcinolone (KENALOG) 0.1 % ointment, Apply  topically to the appropriate area as directed 2 (Two) Times a Day As Needed for Irritation., Disp: 15 g, Rfl: 1    Current Facility-Administered Medications:   •  denosumab (PROLIA) syringe 60 mg, 60  mg, Subcutaneous, Q6 Months, Victor Manuel Wright MD, 60 mg at 07/12/22 1500    No Known Allergies    Review of Systems  GENERAL:  Active/slower with limits, speed, samni for age. Sleep is ok. No fever now/recent  CHEST: No chest wall tenderness or mass. No cough, without wheeze, SOB.  CV: No chest pain, palpatations, ankle edema.     Results for orders placed or performed in visit on 12/21/22   Comprehensive Metabolic Panel    Specimen: Blood   Result Value Ref Range    Glucose 94 65 - 99 mg/dL    BUN 15 8 - 23 mg/dL    Creatinine 0.72 0.57 - 1.00 mg/dL    EGFR Result 82.1 >60.0 mL/min/1.73    BUN/Creatinine Ratio 20.8 7.0 - 25.0    Sodium 138 136 - 145 mmol/L    Potassium 4.8 3.5 - 5.2 mmol/L    Chloride 98 98 - 107 mmol/L    Total CO2 27.5 22.0 - 29.0 mmol/L    Calcium 10.0 8.6 - 10.5 mg/dL    Total Protein 7.1 6.0 - 8.5 g/dL    Albumin 4.40 3.50 - 5.20 g/dL    Globulin 2.7 gm/dL    A/G Ratio 1.6 g/dL    Total Bilirubin 0.6 0.0 - 1.2 mg/dL    Alkaline Phosphatase 100 39 - 117 U/L    AST (SGOT) 44 (H) 1 - 32 U/L    ALT (SGPT) 19 1 - 33 U/L   Lipid Panel With / Chol / HDL Ratio    Specimen: Blood   Result Value Ref Range    Total Cholesterol 217 (H) 0 - 200 mg/dL    Triglycerides 157 (H) 0 - 150 mg/dL    HDL Cholesterol 67 (H) 40 - 60 mg/dL    VLDL Cholesterol Bruce 27 5 - 40 mg/dL    LDL Chol Calc (NIH) 123 (H) 0 - 100 mg/dL    Chol/HDL Ratio 3.24    TSH    Specimen: Blood   Result Value Ref Range    TSH 1.730 0.270 - 4.200 uIU/mL   Vitamin D,25-Hydroxy    Specimen: Blood   Result Value Ref Range    25 Hydroxy, Vitamin D 69.1 30.0 - 100.0 ng/ml   Iron    Specimen: Blood   Result Value Ref Range    Iron 153 (H) 37 - 145 mcg/dL   Vitamin B12    Specimen: Blood   Result Value Ref Range    Vitamin B-12 1,095 (H) 211 - 946 pg/mL   Folate    Specimen: Blood   Result Value Ref Range    Folate >20.00 4.78 - 24.20 ng/mL   CBC & Differential    Specimen: Blood   Result Value Ref Range    WBC 9.61 3.40 - 10.80 10*3/mm3    RBC  "4.93 3.77 - 5.28 10*6/mm3    Hemoglobin 15.2 12.0 - 15.9 g/dL    Hematocrit 45.1 34.0 - 46.6 %    MCV 91.5 79.0 - 97.0 fL    MCH 30.8 26.6 - 33.0 pg    MCHC 33.7 31.5 - 35.7 g/dL    RDW 12.6 12.3 - 15.4 %    Platelets 331 140 - 450 10*3/mm3    Neutrophil Rel % 57.5 42.7 - 76.0 %    Lymphocyte Rel % 27.9 19.6 - 45.3 %    Monocyte Rel % 7.9 5.0 - 12.0 %    Eosinophil Rel % 5.4 0.3 - 6.2 %    Basophil Rel % 1.1 0.0 - 1.5 %    Neutrophils Absolute 5.52 1.70 - 7.00 10*3/mm3    Lymphocytes Absolute 2.68 0.70 - 3.10 10*3/mm3    Monocytes Absolute 0.76 0.10 - 0.90 10*3/mm3    Eosinophils Absolute 0.52 (H) 0.00 - 0.40 10*3/mm3    Basophils Absolute 0.11 0.00 - 0.20 10*3/mm3    Immature Granulocyte Rel % 0.2 0.0 - 0.5 %    Immature Grans Absolute 0.02 0.00 - 0.05 10*3/mm3    nRBC 0.0 0.0 - 0.2 /100 WBC       LABS  CBC:  Lab Results - Last 18 Months   Lab Units 12/21/22  1348 06/21/22  1000   WBC 10*3/mm3 9.61 12.51*   HEMATOCRIT % 45.1 40.7     CMP:  Lab Results - Last 18 Months   Lab Units 12/21/22  1348 06/21/22  1000   SODIUM mmol/L 138 132*   CHLORIDE mmol/L 98 95*   TOTAL CO2 mmol/L 27.5 26.7   BUN mg/dL 15 13   CREATININE mg/dL 0.72 0.70   CALCIUM mg/dL 10.0 9.4     HEPATIC:  Lab Results - Last 18 Months   Lab Units 12/21/22  1348 06/21/22  1000   ALT (SGPT) U/L 19 25     THYROID:  Lab Results - Last 18 Months   Lab Units 12/21/22  1348 06/21/22  1000   TSH uIU/mL 1.730 1.100     A1C:No results for input(s): HGBA1C in the last 99991 hours.  PSA:@(psa:7)@      Objective   /68 (BP Location: Right arm, Patient Position: Sitting, Cuff Size: Adult)   Pulse 86   Temp 96.8 °F (36 °C) (Infrared)   Resp 16   Ht 159.4 cm (62.75\")   Wt 61.2 kg (135 lb)   SpO2 98%   BMI 24.11 kg/m²     Physical Exam  GENERAL:  Well nourished/developed in no acute distress.  SKIN: Turgor excellent, without wound, rash, lesion. (image below same)  CV: Regular rhythm.  No murmur, gallop, edema. Posterior pulses intact.    LUNGS: Symmetric " motion with clear to auscultation.    PSYCH: Oriented x 3.  Pleasant calm, well kept.  Purposeful/directed conservation with intact short/long gross memory.          File Link    Scan on 12/28/2022 1336 by Victor Manuel Wright MD: R shoulder        Key Information    Document ID File Type Document Type Description   [id number] Image CLINICAL PHOTOGRAPHS OTHER R shoulder      Import Information    Attached At Date Time User Dept   Encounter Level 12/28/2022  1:36 PM Victor Manuel Wright MD [facility]     Encounter    Office Visit on 12/28/22 with Victor Manuel Wright MD       PROCEDURE:     Patient gave verbal permission to the procedure as described below.  Was advised there are risk of pain, scaring, infection, regrowth, need for revision/additional treatment/referral and possible additional costs of a pathology report.   The surgical/treatment area was cleansed with betadine.  The lesion/edge was injected with 1% xylocaine without epinephrine no more than 1.5 cc.  Once the area was numb we used a punch instrument to perform a 3mm punch biopsy.  The sample was sent for pathological review.  Pressure was held with some hemostasis; the edge of the punch was then hyfricated with some closure and complete hemostasis.  The patient appeared to tolerate the procedure well. The wound was cleansed with peroxide and antibiotic ointment was applied and bandaid after that.  Punch      Assessment & Plan     1. Skin lesion        Rx: reviewed/changes:  No orders of the defined types were placed in this encounter.    LAB/Testing/Referrals: reviewed/orders:   Today:   No orders of the defined types were placed in this encounter.    Chronic/recurrent labs above or change to:   same     Discussions:   Instructions below      BMI is within normal parameters. No other follow-up for BMI required.    Hillary Overton  reports that she quit smoking about 33 years ago. Her smoking use included cigarettes. She started  smoking about 67 years ago. She smoked an average of .5 packs per day. She has never used smokeless tobacco..         Patient Instructions   Please cleanse your treatment site/wound with soap and clean water 2-3 times a day or as needed.  Please report signs of infection such as reddness, increasing pain, drainage, splitting or obvious problems now or regrowth in the future.  Try to keep the area dry...showering is ok if brief and dry well when done. If drainage or the wound/site is going to be exposed to soilage; keep covered with bandage/bandaid as needed. Be aware there was a specimum/pathology taken today;  be sure and call us for results if not called by us in 7-10 days    #######################    If any skin lesion  Grows in size; especially greater than pencil eraser  Changes in color  Becomes ulcerated/bleeds  Itches/hurts  Or changes; let us know        Follow up: Return for lab/Dr Wright as planned.

## 2023-02-17 LAB
DX ICD CODE: NORMAL
DX ICD CODE: NORMAL
PATH REPORT.FINAL DX SPEC: NORMAL
PATH REPORT.GROSS SPEC: NORMAL
PATH REPORT.MICROSCOPIC SPEC OTHER STN: NORMAL
PATH REPORT.SITE OF ORIGIN SPEC: NORMAL
PATH REPORT.SUPPLEMENTAL REPORTS SPEC: NORMAL
PATHOLOGIST NAME: NORMAL
PAYMENT PROCEDURE: NORMAL

## 2023-03-27 DIAGNOSIS — I10 PRIMARY HYPERTENSION: ICD-10-CM

## 2023-03-27 RX ORDER — LISINOPRIL 10 MG/1
10 TABLET ORAL DAILY
Qty: 90 TABLET | Refills: 1 | Status: SHIPPED | OUTPATIENT
Start: 2023-03-27

## 2023-06-28 PROBLEM — M19.90 DEGENERATIVE JOINT DISEASE: Status: ACTIVE | Noted: 2023-06-28

## 2023-08-24 ENCOUNTER — TELEPHONE (OUTPATIENT)
Dept: FAMILY MEDICINE CLINIC | Facility: CLINIC | Age: 88
End: 2023-08-24
Payer: MEDICARE

## 2023-08-24 DIAGNOSIS — M81.0 OSTEOPOROSIS, UNSPECIFIED OSTEOPOROSIS TYPE, UNSPECIFIED PATHOLOGICAL FRACTURE PRESENCE: ICD-10-CM

## 2023-08-24 RX ORDER — DENOSUMAB 60 MG/ML
60 INJECTION SUBCUTANEOUS
Qty: 1 ML | Refills: 1 | Status: SHIPPED | OUTPATIENT
Start: 2023-08-24

## 2023-08-24 NOTE — TELEPHONE ENCOUNTER
Caller: Hillary Overton    Relationship to patient: Self    Best call back number:     [phone]        Patient is needing: [facility] NEED[facility] US TO SEND THE SCRIPT       denosumab (PROLIA) syringe 60 mg  SENT TO [facility] - [facility] [address] [phone] [facility] [phone] FX      CALLED THE PATIENT AND TOLD HER IT WAS ?

## 2023-09-20 DIAGNOSIS — I10 PRIMARY HYPERTENSION: ICD-10-CM

## 2023-09-20 RX ORDER — LISINOPRIL 10 MG/1
10 TABLET ORAL DAILY
Qty: 90 TABLET | Refills: 1 | Status: SHIPPED | OUTPATIENT
Start: 2023-09-20

## 2023-09-27 ENCOUNTER — TELEPHONE (OUTPATIENT)
Dept: FAMILY MEDICINE CLINIC | Facility: CLINIC | Age: 88
End: 2023-09-27
Payer: MEDICARE

## 2023-09-27 DIAGNOSIS — N39.0 URINARY TRACT INFECTION WITHOUT HEMATURIA, SITE UNSPECIFIED: Primary | ICD-10-CM

## 2023-09-27 NOTE — TELEPHONE ENCOUNTER
"  Caller: Hillary Overton    Relationship: Self    Best call back number: [phone]     What medication are you requesting: ANTIBIOTIC     What are your current symptoms: \"ODOR\", PATIENT STATES SHE \"SMELLS HERSELF    How long have you been experiencing symptoms: OVER 1 WEEK     Have you had these symptoms before:    [] Yes  [x] No    Have you been treated for these symptoms before:   [] Yes  [x] No    If a prescription is needed, what is your preferred pharmacy and phone number: [id number] [place], [place] - [address] AT [facility] [address] [facility] - [phone] [facility] [phone] FX     Additional notes: PATIENT STATES SHE HAS VERY \"FOUL ODOR\" COMING FROM HERSELF. PATIENT STATED SHE HAS VERY GOOD HYGIENE AND IS UNSURE WHERE THIS ODOR IS COMING FROM.      "

## 2023-09-29 LAB
APPEARANCE UR: CLEAR
BACTERIA #/AREA URNS HPF: NORMAL /[HPF]
BACTERIA UR CULT: ABNORMAL
BILIRUB UR QL STRIP: NEGATIVE
CASTS URNS QL MICRO: NORMAL /LPF
COLOR UR: YELLOW
EPI CELLS #/AREA URNS HPF: NORMAL /HPF (ref 0–10)
GLUCOSE UR QL STRIP: NEGATIVE
HGB UR QL STRIP: NEGATIVE
KETONES UR QL STRIP: NEGATIVE
LEUKOCYTE ESTERASE UR QL STRIP: ABNORMAL
MICRO URNS: ABNORMAL
NITRITE UR QL STRIP: NEGATIVE
PH UR STRIP: 7 [PH] (ref 5–7.5)
PROT UR QL STRIP: NEGATIVE
RBC #/AREA URNS HPF: NORMAL /HPF (ref 0–2)
SP GR UR STRIP: 1.01 (ref 1–1.03)
URINALYSIS REFLEX: ABNORMAL
UROBILINOGEN UR STRIP-MCNC: 0.2 MG/DL (ref 0.2–1)
WBC #/AREA URNS HPF: NORMAL /HPF (ref 0–5)

## 2023-09-29 RX ORDER — AMOXICILLIN AND CLAVULANATE POTASSIUM 500; 125 MG/1; MG/1
1 TABLET, FILM COATED ORAL 3 TIMES DAILY
Qty: 21 TABLET | Refills: 0 | Status: SHIPPED | OUTPATIENT
Start: 2023-09-29

## 2023-09-29 NOTE — PROGRESS NOTES
Please call result -has a low-grade strep infection of the urine.  I will send in Augmentin.    Electronically signed by MARIAH Jimenez, 09/29/23, 8:19 AM CDT.

## 2023-10-18 DIAGNOSIS — N39.0 URINARY TRACT INFECTION WITHOUT HEMATURIA, SITE UNSPECIFIED: Primary | ICD-10-CM

## 2023-10-18 RX ORDER — INFLUENZA A VIRUS A/MICHIGAN/45/2015 X-275 (H1N1) ANTIGEN (FORMALDEHYDE INACTIVATED), INFLUENZA A VIRUS A/SINGAPORE/INFIMH-16-0019/2016 IVR-186 (H3N2) ANTIGEN (FORMALDEHYDE INACTIVATED), INFLUENZA B VIRUS B/PHUKET/3073/2013 ANTIGEN (FORMALDEHYDE INACTIVATED), AND INFLUENZA B VIRUS B/MARYLAND/15/2016 BX-69A ANTIGEN (FORMALDEHYDE INACTIVATED) 60; 60; 60; 60 UG/.7ML; UG/.7ML; UG/.7ML; UG/.7ML
INJECTION, SUSPENSION INTRAMUSCULAR
Refills: 0 | OUTPATIENT
Start: 2023-10-18

## 2023-10-18 NOTE — TELEPHONE ENCOUNTER
Pt asked if she can get a repeat abx for symptoms of kidney infection. Last round did not clear up symptoms.

## 2023-10-18 NOTE — TELEPHONE ENCOUNTER
9.27.23 2K beta hemolytic strept  9.27.23; UA neg  (Malik/augmentin)    I now have doubts that she has UTI  Clarify symptoms    A. Fever  B. Hematuria  C. Rash  D. Frequency  E. Incontinence  F. Dysuria?

## 2023-10-18 NOTE — TELEPHONE ENCOUNTER
Patient stated she does have urination frequency but no other symptoms when she came in she stated she feels like she smells and that she can smell herself and that she has always had good hygiene and I do believe that was the biggest concern.

## 2023-10-19 NOTE — TELEPHONE ENCOUNTER
Either she has to agree to vaginal exam with us OR prefer we send to say female gynecology    Also; want UA/urine culture but I think this culture is going to be ok and she will need the exam

## 2023-10-21 LAB
APPEARANCE UR: CLEAR
BACTERIA #/AREA URNS HPF: NORMAL /HPF
BACTERIA UR CULT: NO GROWTH
BACTERIA UR CULT: NORMAL
BILIRUB UR QL STRIP: NEGATIVE
CASTS URNS MICRO: NORMAL
COLOR UR: YELLOW
EPI CELLS #/AREA URNS HPF: NORMAL /HPF
GLUCOSE UR QL STRIP: NEGATIVE
HGB UR QL STRIP: NEGATIVE
KETONES UR QL STRIP: NEGATIVE
LEUKOCYTE ESTERASE UR QL STRIP: ABNORMAL
NITRITE UR QL STRIP: NEGATIVE
PH UR STRIP: 6 [PH] (ref 5–8)
PROT UR QL STRIP: NEGATIVE
RBC #/AREA URNS HPF: NORMAL /HPF
SP GR UR STRIP: 1.02 (ref 1–1.03)
UROBILINOGEN UR STRIP-MCNC: ABNORMAL MG/DL
WBC #/AREA URNS HPF: NORMAL /HPF

## 2023-10-25 ENCOUNTER — OFFICE VISIT (OUTPATIENT)
Dept: FAMILY MEDICINE CLINIC | Facility: CLINIC | Age: 88
End: 2023-10-25
Payer: MEDICARE

## 2023-10-25 VITALS
RESPIRATION RATE: 18 BRPM | HEIGHT: 63 IN | SYSTOLIC BLOOD PRESSURE: 122 MMHG | BODY MASS INDEX: 23.96 KG/M2 | WEIGHT: 135.2 LBS | OXYGEN SATURATION: 97 % | DIASTOLIC BLOOD PRESSURE: 78 MMHG | HEART RATE: 96 BPM | TEMPERATURE: 97.1 F

## 2023-10-25 DIAGNOSIS — R82.90 BAD ODOR OF URINE: ICD-10-CM

## 2023-10-25 DIAGNOSIS — R30.0 DYSURIA: ICD-10-CM

## 2023-10-25 RX ORDER — ESTRADIOL 0.1 MG/G
CREAM VAGINAL
Qty: 1 EACH | Refills: 0 | Status: SHIPPED | OUTPATIENT
Start: 2023-10-25

## 2023-10-25 NOTE — PROGRESS NOTES
ELIA”.   Subjective   Hillary Overton is a 87 y.o. female presenting with chief complaint of:   Chief Complaint   Patient presents with    Vaginal odor     Patient had taken cranberry pills, became itchy and had odor but states it is resolving     AWV 6.28.23  Last Completed Annual Wellness Visit            ANNUAL WELLNESS VISIT (Yearly) Next due on 6/28/2024 06/28/2023  Level of Service: [place] PPPS, SUBSEQ VISIT    06/23/2022  Level of Service: [place] PPPS, SUBSEQ VISIT    06/23/2021  Level of Service: [place] PPPS, SUBSEQ VISIT    06/23/2020  Level of Service: [place] PPPS, SUBSEQ VISIT    06/23/2020  Done    Only the first 5 history entries have been loaded, but more history exists.                     History of Present Illness :  Alone.  Here for primarily recent on/off odorous urine and some dysuria.  It never went on to hematuria or fever.    Has few chronic problems to consider that might have a bearing on today's issues; not an interval appointment.       Chronic/acute problems reviewed today:   1. Bad odor of urine    2. Dysuria      Has an/another acute issue today: none.    The following portions of the patient's history were reviewed and updated as appropriate: allergies, current medications, past family history, past medical history, past social history, past surgical history, and problem list.      Current Outpatient Medications:     aspirin 81 MG EC tablet, Take 1 tablet by mouth Daily., Disp: , Rfl:     B Complex Vitamins (B COMPLEX PO), Take 1 tablet by mouth Daily., Disp: , Rfl:     BIOTIN 5000 PO, Take 1 capsule by mouth Daily., Disp: , Rfl:     Calcium Carb-Cholecalciferol (CALCIUM-VITAMIN D) 600-400 MG-UNIT tablet, Take 1 tablet by mouth Daily., Disp: , Rfl:     denosumab (Prolia) 60 MG/ML solution prefilled syringe syringe, Inject 1 mL under the skin into the appropriate area as directed Every 6 (Six) Months., Disp: 1 mL, Rfl: 1    Diclofenac Sodium (Voltaren) 1 % gel gel, Apply 4 g topically to the  appropriate area as directed 4 (Four) Times a Day As Needed (hand/hand joint pain)., Disp: , Rfl:     lisinopril (PRINIVIL,ZESTRIL) 10 MG tablet, TAKE 1 TABLET BY MOUTH DAILY, Disp: 90 tablet, Rfl: 1    loperamide (Imodium A-D) 2 MG tablet, Take 1 tablet by mouth 4 (Four) Times a Day As Needed for Diarrhea., Disp: , Rfl:     Multiple Vitamins-Minerals (CENTRUM SILVER ULTRA WOMENS) tablet, Take 1 tablet by mouth Daily., Disp: , Rfl:     Omega-3 Fatty Acids (FISH OIL) 1000 MG capsule capsule, Take 1 capsule by mouth Daily With Breakfast., Disp: , Rfl:     triamcinolone (KENALOG) 0.1 % ointment, Apply  topically to the appropriate area as directed 2 (Two) Times a Day As Needed for Irritation., Disp: 15 g, Rfl: 1    vitamin B-12 (CYANOCOBALAMIN) 100 MCG tablet, Take 0.5 tablets by mouth., Disp: , Rfl:     amoxicillin-clavulanate (Augmentin) 500-125 MG per tablet, Take 1 tablet by mouth 3 (Three) Times a Day. (Patient not taking: Reported on 10/25/2023), Disp: 21 tablet, Rfl: 0      Current Facility-Administered Medications:     denosumab (PROLIA) syringe 60 mg, 60 mg, Subcutaneous, Q6 Months, Victor Manuel Wright MD, 60 mg at 06/28/23 1332    No problems with medications.    No Known Allergies    Review of Systems  GENERAL:  Active/slower with limits, speed, stamina for age and today. Sleep is ok. No fever now/recent.  SKIN: No rash/skin lesion of concern beyond if above.   ENDO:  No syncope, near or diaphoretic sweaty spells.  HEENT: No recent head injury; same on/off headache.   No vision change.  No significant hearing loss.  Ears without pain/drainage.  No sore throat.  No significant nasal/sinus congestion/drainage. No epistaxis.  CHEST: No chest wall tenderness or mass. No cough, without wheeze.  No SOB; no hemoptysis.  CV: No chest pain, palpitations, ankle edema.  GI: No heartburn, dysphagia.  No usual abdominal pain, diarrhea, constipation-see above.  No rectal bleeding, or melena.    :  Voids with on/off  recent dysuria,  without incontinence to completion.  ORTHO: No painful/swollen joints but various on /off sore.  No sore neck or back.  No acute neck or back pain without recent injury.  NEURO: No dizziness, weakness of extremities.  No numbness/paresthesias.   PSYCH: No memory loss.  Mood good; not anxious, depressed but/and not suicidal.  Tries to tolerate stress .   Screening:  Mammogram: 8.16.22 MM-ordered  Bone density:   8.16.22/Bone d-deca/[facility]/LS -1.3 hip -1.5/8.16.22-prolia-using  7.24.20 bone d-deca/[facility]/LS -2.6 hip -1.7/7.24.20; qualifies for boniva  GI:   EGD-Inflammatory change of bulb/MM//Khari/08-24-05  H pylori/Khari/8-24-05  Colon-div/Mc/MM/9.12.22/prn  Prostate: NA  Usual lab order  12m CBC, CMP, LIPID, TSH, Vit D, iron, B12, folate    Copy/paste function used for ROS/exam AND each area of these were reviewed, updated, confirmed and supplemented as needed.  Data reviewed:   Recent admit/ER/MD visits: 6.28.23    1. Primary hypertension    2. Congestive heart failure, unspecified HF chronicity, unspecified heart failure type    3. Anticoagulated prevention/ASA 81    4. Wellness examination-done    5. Anemia, unspecified type    6. Age related osteoporosis, unspecified pathological fracture presence    7. Osteoarthritis, unspecified osteoarthritis type, unspecified site    8. Encounter for screening mammogram for breast cancer          Issues that are new, uncontrolled, or required review HPI/ROS/exam and decisions beyond wellness today: (ie: requiring the service of a physician and/or not likely to resolve independently without clinical intervention.)   Hand pain/DJD  Anemia eval needs stool OB     Discussions/medical decisions/reviews:  BP ok  Other vitals ok  DM/BS 94 12.21.22  Lipid  12.21.22  PSA NA  CBC 6.21.23  Iron 108N 6.21.23  B12 1095 12.21.22  Folate > 20 12.21.22  Renal ok 12.21.22  Liver AST 44 12.21.22  Vit D 69 12.21.22  Thyroid TSH ok 12.21.22     Wellness/or annual done  "  Screening reviewed/updated   Vaccines discussed shingles free  Voltaren gel OTC; minimal to fingers  Stool OB     Data review above:   Rx: reviewed and decisions:   Rx new/changes:        New Medications Ordered This Visit   Medications    Diclofenac Sodium (Voltaren) 1 % gel gel       Sig: Apply 4 g topically to the appropriate area as directed 4 (Four) Times a Day As Needed (hand/hand joint pain).       Last cardiac testing:   Echo:     Radiology considered:   No radiology results for the last 90 days.    Lab Results:  Results for orders placed or performed in visit on 10/18/23   Urine Culture - Urine, Urine, Clean Catch    Specimen: Urine, Clean Catch    UR   Result Value Ref Range    Urine Culture Final report     Result 1 No growth    Microscopic Examination -   Result Value Ref Range    WBC, UA 0-2 /HPF    RBC, UA 0-2 /HPF    Epithelial Cells (non renal) 0-2 /HPF    Cast Type Comment     Bacteria, UA Comment None Seen /HPF   Urinalysis With Microscopic - Urine, Clean Catch    Specimen: Urine, Clean Catch   Result Value Ref Range    Specific Gravity, UA 1.019 1.005 - 1.030    pH, UA 6.0 5.0 - 8.0    Color, UA Yellow     Appearance, UA Clear Clear    Leukocytes, UA Trace (A) Negative    Protein Negative Negative    Glucose, UA Negative Negative    Ketones Negative Negative    Blood, UA Negative Negative    Bilirubin, UA Negative Negative    Urobilinogen, UA Comment     Nitrite, UA Negative Negative       A1C:No results for input(s): \"HGBA1C\" in the last 31326 hours.  GLUCOSE:  Lab Results - Last 18 Months   Lab Units 12/21/22  1348 06/21/22  1000   GLUCOSE mg/dL 94 105*     LIPID:  Lab Results - Last 18 Months   Lab Units 12/21/22  1348 06/21/22  1000   CHOLESTEROL mg/dL 217* 159   LDL CHOL mg/dL 123* 69   HDL CHOL mg/dL 67* 75*   TRIGLYCERIDES mg/dL 157* 80     PSA:No results found for: \"PSA\"    CBC:  Lab Results - Last 18 Months   Lab Units 06/21/23  0810 12/21/22  1348 06/21/22  1000   WBC 10*3/mm3 7.01 " "9.61 12.51*   HEMOGLOBIN g/dL 14.8 15.2 14.2   HEMATOCRIT % 44.0 45.1 40.7   PLATELETS 10*3/mm3 318 331 293   IRON mcg/dL 108 153* 83   VITAMIN B 12 pg/mL  --  1,095* 844   FOLATE ng/mL  --  >20.00 12.40     BMP/CMP:  Lab Results - Last 18 Months   Lab Units 12/21/22  1348 06/21/22  1000   SODIUM mmol/L 138 132*   POTASSIUM mmol/L 4.8 4.4   CHLORIDE mmol/L 98 95*   CO2 mmol/L 27.5 26.7   GLUCOSE mg/dL 94 105*   BUN mg/dL 15 13   CREATININE mg/dL 0.72 0.70   EGFR RESULT mL/min/1.73 82.1 85.4   CALCIUM mg/dL 10.0 9.4       HEPATIC:  Lab Results - Last 18 Months   Lab Units 12/21/22  1348 06/21/22  1000   ALT (SGPT) U/L 19 25   AST (SGOT) U/L 44* 57*   ALK PHOS U/L 100 112     HEPATITIS C ANTIBODY: No results found for: \"HEPCVIRUSABY\"  Vit D:  Lab Results - Last 18 Months   Lab Units 12/21/22  1348 06/21/22  1000   VIT D 25 HYDROXY ng/ml 69.1 65.9     THYROID:  Lab Results - Last 18 Months   Lab Units 12/21/22  1348 06/21/22  1000   TSH uIU/mL 1.730 1.100       Objective   /78   Pulse 96   Temp 97.1 °F (36.2 °C) (Temporal)   Resp 18   Ht 159.4 cm (62.75\")   Wt 61.3 kg (135 lb 3.2 oz)   SpO2 97%   BMI 24.14 kg/m²   Body mass index is 24.14 kg/m².    Recent Vitals         2/14/2023 6/28/2023 10/25/2023       BP: 124/68 140/76 122/78     Pulse: 86 77 96     Temp: 96.8 °F (36 °C) 98.6 °F (37 °C) 97.1 °F (36.2 °C)     Weight: 61.2 kg (135 lb) 59.9 kg (132 lb) 61.3 kg (135 lb 3.2 oz)     BMI (Calculated): 24.1 23.6 24.1           Wt Readings from Last 15 Encounters:   10/25/23 1317 61.3 kg (135 lb 3.2 oz)   06/28/23 1120 59.9 kg (132 lb)   02/14/23 1435 61.2 kg (135 lb)   12/28/22 1316 61.2 kg (135 lb)   08/09/22 1255 59 kg (130 lb)   06/23/22 1310 61.2 kg (135 lb)   06/21/22 1400 62.3 kg (137 lb 6.4 oz)   06/23/21 1257 60.8 kg (134 lb)   06/23/20 1327 60.8 kg (134 lb)   05/10/18 1359 57.2 kg (126 lb)   05/09/17 1405 62.6 kg (138 lb)       Physical Exam  GENERAL:  Well nourished/developed in no acute distress. "   SKIN: Turgor excellent, without wound, rash, lesion  HEENT: Normal cephalic without trauma.  Pupils equal round reactive to light. Extraocular motions full without nystagmus.     No thyromegaly, mass, tenderness, lymphadenopathy and supple.  CV: Regular rhythm.  No murmur, gallop,  edema. Posterior pulses intact.  No carotid bruits.  CHEST: No chest wall tenderness or mass.   LUNGS: Symmetric motion with clear to auscultation.    ABD: Soft, nontender without mass.   ORTHO: Symmetric extremities without swelling/point tenderness.  Full gross range of motion.  GYNE: declined by patient  NEURO: CN 2-12 grossly intact.  Symmetric facies and UE/LE. 3/5 strength throughout. 1/4 x bicep  equal reflexes.  Nonfocal use extremities. Speech clear. Intact light touch with monofilament, vibratory sensation with tuning fork; equal toes/distal feet.    PSYCH: Oriented x 3.  Pleasant calm, well kept.  Purposeful/directed conservation with intact short/long gross memory    Assessment & Plan     1. Bad odor of urine    2. Dysuria        Data review above:   Discussions/medical decisions/reviews:  BP ok  Other vitals ok  Recent Vitals         2/14/2023 6/28/2023 10/25/2023       BP: 124/68 140/76 122/78     Pulse: 86 77 96     Temp: 96.8 °F (36 °C) 98.6 °F (37 °C) 97.1 °F (36.2 °C)     Weight: 61.2 kg (135 lb) 59.9 kg (132 lb) 61.3 kg (135 lb 3.2 oz)     BMI (Calculated): 24.1 23.6 24.1           Screening reviewed/updated reminded mammogram  Trial emperic estrogen cream; ? Differential includes atrophic vaginitis  First week daily apply then twice a week  Discussed difference atrophic vaginitis/UTI  Encourage 60 oz f/uid/24 hr    Follow up: Return for as planned 1.2024.  Future Appointments   Date Time Provider Department Center   1/2/2024  2:30 PM Victor Manuel Wright MD [facility]       Data review above:   Rx: reviewed and decisions:   Rx new/changes: new  New Medications Ordered This Visit   Medications    estradiol  "(ESTRACE VAGINAL) 0.1 MG/GM vaginal cream     Sig: Apply lightly to area urine comes out as directed     Dispense:  1 each     Refill:  0       Orders placed:   LAB/Testing/Referrals: reviewed/orders:   Today:   No orders of the defined types were placed in this encounter.    Chronic/recurrent labs above or change to:   Same     Immunization History   Administered Date(s) Administered    COVID-19 (MODERNA) 1st,2nd,3rd Dose Monovalent 02/25/2021, 03/25/2021    COVID-19 (UNSPECIFIED) 10/18/2023    Fluzone High Dose =>65 Years ([employer] ONLY) 11/07/2019, 10/18/2023    Fluzone Quad >6mos (Multi-dose) 11/23/2016, 10/31/2017    Influenza Quad Vaccine (Inpatient) 10/31/2017    Influenza, Unspecified 10/05/2022    PEDS-Pneumococcal Conjugate (PCV7) 10/09/2009    Pneumococcal Conjugate 13-Valent (PCV13) 10/09/2009, 10/21/2015    Pneumococcal Conjugate 20-Valent (PCV20) 12/28/2022    Pneumococcal Conjugate Unspecified 10/09/2009    Pneumococcal, Unspecified 10/09/2009    Tdap 06/23/2021     We advised/reaffirmed our support/suggestion for staying complete with covid- covid boosters, seasonal flu/yearly and any missing vaccine from list we supplied; when cannot be given here we suggest contact with local health department office or pharmacy to review missing/needed vaccines and then bring nursing documentation for these vaccines to this office or call this information in. Shingles became \"free\" 1.1.23 for medicare insurance.    Health maintenance:   Body mass index is 24.14 kg/m².  BMI is within normal parameters. No other follow-up for BMI required.      Tobacco use reviewed:   Hillary Overton  reports that she quit smoking about 33 years ago. Her smoking use included cigarettes. She started smoking about 67 years ago. She has a 17.00 pack-year smoking history. She has never used smokeless tobacco..   There are no Patient Instructions on file for this visit.          "

## 2023-12-29 DIAGNOSIS — I50.9 CONGESTIVE HEART FAILURE, UNSPECIFIED HF CHRONICITY, UNSPECIFIED HEART FAILURE TYPE: ICD-10-CM

## 2023-12-29 DIAGNOSIS — I10 PRIMARY HYPERTENSION: Primary | Chronic | ICD-10-CM

## 2023-12-29 DIAGNOSIS — E55.9 VITAMIN D DEFICIENCY: ICD-10-CM

## 2023-12-29 DIAGNOSIS — Z86.2 HISTORY OF ANEMIA: ICD-10-CM

## 2023-12-29 DIAGNOSIS — E53.8 VITAMIN B12 DEFICIENCY: ICD-10-CM

## 2023-12-30 LAB
25(OH)D3+25(OH)D2 SERPL-MCNC: 64.2 NG/ML (ref 30–100)
ALBUMIN SERPL-MCNC: 4.4 G/DL (ref 3.5–5.2)
ALBUMIN/GLOB SERPL: 1.7 G/DL
ALP SERPL-CCNC: 100 U/L (ref 39–117)
ALT SERPL-CCNC: 21 U/L (ref 1–33)
AST SERPL-CCNC: 36 U/L (ref 1–32)
BASOPHILS # BLD AUTO: 0.13 10*3/MM3 (ref 0–0.2)
BASOPHILS NFR BLD AUTO: 1.7 % (ref 0–1.5)
BILIRUB SERPL-MCNC: 0.3 MG/DL (ref 0–1.2)
BUN SERPL-MCNC: 14 MG/DL (ref 8–23)
BUN/CREAT SERPL: 18.2 (ref 7–25)
CALCIUM SERPL-MCNC: 9.8 MG/DL (ref 8.6–10.5)
CHLORIDE SERPL-SCNC: 99 MMOL/L (ref 98–107)
CHOLEST SERPL-MCNC: 198 MG/DL (ref 0–200)
CHOLEST/HDLC SERPL: 3.36 {RATIO}
CO2 SERPL-SCNC: 25.8 MMOL/L (ref 22–29)
CREAT SERPL-MCNC: 0.77 MG/DL (ref 0.57–1)
EGFRCR SERPLBLD CKD-EPI 2021: 75.2 ML/MIN/1.73
EOSINOPHIL # BLD AUTO: 0.67 10*3/MM3 (ref 0–0.4)
EOSINOPHIL NFR BLD AUTO: 8.5 % (ref 0.3–6.2)
ERYTHROCYTE [DISTWIDTH] IN BLOOD BY AUTOMATED COUNT: 13 % (ref 12.3–15.4)
FOLATE SERPL-MCNC: 19.5 NG/ML (ref 4.78–24.2)
GLOBULIN SER CALC-MCNC: 2.6 GM/DL
GLUCOSE SERPL-MCNC: 93 MG/DL (ref 65–99)
HCT VFR BLD AUTO: 45.7 % (ref 34–46.6)
HDLC SERPL-MCNC: 59 MG/DL (ref 40–60)
HGB BLD-MCNC: 15.1 G/DL (ref 12–15.9)
IMM GRANULOCYTES # BLD AUTO: 0.03 10*3/MM3 (ref 0–0.05)
IMM GRANULOCYTES NFR BLD AUTO: 0.4 % (ref 0–0.5)
IRON SERPL-MCNC: 74 MCG/DL (ref 37–145)
LDLC SERPL CALC-MCNC: 112 MG/DL (ref 0–100)
LYMPHOCYTES # BLD AUTO: 2.37 10*3/MM3 (ref 0.7–3.1)
LYMPHOCYTES NFR BLD AUTO: 30.2 % (ref 19.6–45.3)
MCH RBC QN AUTO: 30.9 PG (ref 26.6–33)
MCHC RBC AUTO-ENTMCNC: 33 G/DL (ref 31.5–35.7)
MCV RBC AUTO: 93.5 FL (ref 79–97)
MONOCYTES # BLD AUTO: 0.66 10*3/MM3 (ref 0.1–0.9)
MONOCYTES NFR BLD AUTO: 8.4 % (ref 5–12)
NEUTROPHILS # BLD AUTO: 3.98 10*3/MM3 (ref 1.7–7)
NEUTROPHILS NFR BLD AUTO: 50.8 % (ref 42.7–76)
NRBC BLD AUTO-RTO: 0 /100 WBC (ref 0–0.2)
PLATELET # BLD AUTO: 350 10*3/MM3 (ref 140–450)
POTASSIUM SERPL-SCNC: 4.6 MMOL/L (ref 3.5–5.2)
PROT SERPL-MCNC: 7 G/DL (ref 6–8.5)
RBC # BLD AUTO: 4.89 10*6/MM3 (ref 3.77–5.28)
SODIUM SERPL-SCNC: 138 MMOL/L (ref 136–145)
TRIGL SERPL-MCNC: 154 MG/DL (ref 0–150)
TSH SERPL DL<=0.005 MIU/L-ACNC: 2.13 UIU/ML (ref 0.27–4.2)
VIT B12 SERPL-MCNC: >2000 PG/ML (ref 211–946)
VLDLC SERPL CALC-MCNC: 27 MG/DL (ref 5–40)
WBC # BLD AUTO: 7.84 10*3/MM3 (ref 3.4–10.8)

## 2024-01-02 ENCOUNTER — OFFICE VISIT (OUTPATIENT)
Dept: FAMILY MEDICINE CLINIC | Facility: CLINIC | Age: 89
End: 2024-01-02
Payer: MEDICARE

## 2024-01-02 VITALS
HEART RATE: 84 BPM | HEIGHT: 63 IN | RESPIRATION RATE: 18 BRPM | SYSTOLIC BLOOD PRESSURE: 118 MMHG | OXYGEN SATURATION: 96 % | DIASTOLIC BLOOD PRESSURE: 78 MMHG | BODY MASS INDEX: 24.27 KG/M2 | WEIGHT: 137 LBS | TEMPERATURE: 96.9 F

## 2024-01-02 DIAGNOSIS — M19.90 OSTEOARTHRITIS, UNSPECIFIED OSTEOARTHRITIS TYPE, UNSPECIFIED SITE: ICD-10-CM

## 2024-01-02 DIAGNOSIS — I50.9 CONGESTIVE HEART FAILURE, UNSPECIFIED HF CHRONICITY, UNSPECIFIED HEART FAILURE TYPE: ICD-10-CM

## 2024-01-02 DIAGNOSIS — R69 MULTIPLE CHRONIC DISEASES: ICD-10-CM

## 2024-01-02 DIAGNOSIS — M81.0 AGE RELATED OSTEOPOROSIS, UNSPECIFIED PATHOLOGICAL FRACTURE PRESENCE: ICD-10-CM

## 2024-01-02 DIAGNOSIS — N95.2 ATROPHIC VAGINITIS: ICD-10-CM

## 2024-01-02 DIAGNOSIS — Z79.01 ANTICOAGULATED: ICD-10-CM

## 2024-01-02 DIAGNOSIS — H91.93 BILATERAL HEARING LOSS, UNSPECIFIED HEARING LOSS TYPE: ICD-10-CM

## 2024-01-02 DIAGNOSIS — D64.9 ANEMIA, UNSPECIFIED TYPE: ICD-10-CM

## 2024-01-02 DIAGNOSIS — I10 PRIMARY HYPERTENSION: Chronic | ICD-10-CM

## 2024-01-02 PROBLEM — H91.90 HEARING LOSS: Status: ACTIVE | Noted: 2024-01-02

## 2024-01-02 NOTE — PROGRESS NOTES
ELIA”.   Subjective   Hillary Overton is a 88 y.o. female presenting with chief complaint of:   Chief Complaint   Patient presents with    Follow-up     AWV 6.28.23  Last Completed Annual Wellness Visit            ANNUAL WELLNESS VISIT (Yearly) Next due on 6/28/2024 06/28/2023  Level of Service: OK PPPS, SUBSEQ VISIT    06/23/2022  Level of Service: OK PPPS, SUBSEQ VISIT    06/23/2021  Level of Service: OK PPPS, SUBSEQ VISIT    06/23/2020  Level of Service: OK PPPS, SUBSEQ VISIT    06/23/2020  Done    Only the first 5 history entries have been loaded, but more history exists.                     History of Present Illness :  Alone.   Here for review of chronic problems that includes HTN and others.     Has multiple chronic problems to consider that might have a bearing on today's issues;  an interval appointment.       Chronic/acute problems reviewed today:   1. Primary hypertension Chronic/stable. Stable here past/and home blood pressures.  No significant chest pain, SOB, LE edema, orthopnea, near syncope, dizziness/light headness.   Recent Vitals         6/28/2023 10/25/2023 1/2/2024       BP: 140/76 122/78 118/78     Pulse: 77 96 84     Temp: 98.6 °F (37 °C) 97.1 °F (36.2 °C) 96.9 °F (36.1 °C)     Weight: 59.9 kg (132 lb) 61.3 kg (135 lb 3.2 oz) 62.1 kg (137 lb)     BMI (Calculated): 23.6 24.1 24.5              2. Congestive heart failure, unspecified HF chronicity, unspecified heart failure type Chronic/stable.  Denies significant sob, orthopnea, leg edema, weight gain.  Aware of influence diet/salt and watching weight at home.       3. Anticoagulated prevention/ASA 81 Chronic/stable reason for stopping or use of.  Denies bleeding issues; especially epistaxis, melena, hematochezia.  Upper arms/others do not significantly bruise easily.  No significant bleeding or falls.      4. Anemia, unspecified type Chronic or past history/stable: This has been present before.    There has been GI evaulation in the  past. There is no current melena, hematochezia. It has been benign to date and stable/treated/watching.  Contributing comorbidities to date: benign.     5. Age related osteoporosis, unspecified pathological fracture presence Chronic/stable.  Last bone density/if below.   Has prolia/ Rx.  Ok further bone density screening when due.      6. Osteoarthritis, unspecified osteoarthritis type, unspecified site Chronic/stable.  Various on/off joint pains/soreness/stiffness.  Particular joint problems with various.  No joint swelling.  Treats mainly with reduced activity, Rx listed, Tylenol.  No  NSAIDs, and no recent/or request injections.      7. Multiple chronic diseases Has multiple chronic problems with increased risks for management (involves polypharmacy, many required labs/imaging/ to manage)     8. Atrophic vaginitis less dysuria and vaginal introital discomfort with estrogen cream   9. Bilateral hearing loss, unspecified hearing loss type chronic stable and improved with hearing aids     Has an/another acute issue today: none.    The following portions of the patient's history were reviewed and updated as appropriate: allergies, current medications, past family history, past medical history, past social history, past surgical history, and problem list.      Current Outpatient Medications:     amoxicillin-clavulanate (Augmentin) 500-125 MG per tablet, Take 1 tablet by mouth 3 (Three) Times a Day. (Patient not taking: Reported on 10/25/2023), Disp: 21 tablet, Rfl: 0    aspirin 81 MG EC tablet, Take 1 tablet by mouth Daily., Disp: , Rfl:     B Complex Vitamins (B COMPLEX PO), Take 1 tablet by mouth Daily., Disp: , Rfl:     BIOTIN 5000 PO, Take 1 capsule by mouth Daily., Disp: , Rfl:     Calcium Carb-Cholecalciferol (CALCIUM-VITAMIN D) 600-400 MG-UNIT tablet, Take 1 tablet by mouth Daily., Disp: , Rfl:     denosumab (Prolia) 60 MG/ML solution prefilled syringe syringe, Inject 1 mL under the skin into the appropriate  area as directed Every 6 (Six) Months., Disp: 1 mL, Rfl: 1    Diclofenac Sodium (Voltaren) 1 % gel gel, Apply 4 g topically to the appropriate area as directed 4 (Four) Times a Day As Needed (hand/hand joint pain)., Disp: , Rfl:     estradiol (ESTRACE VAGINAL) 0.1 MG/GM vaginal cream, Apply lightly to area urine comes out as directed, Disp: 1 each, Rfl: 0    lisinopril (PRINIVIL,ZESTRIL) 10 MG tablet, TAKE 1 TABLET BY MOUTH DAILY, Disp: 90 tablet, Rfl: 1    loperamide (Imodium A-D) 2 MG tablet, Take 1 tablet by mouth 4 (Four) Times a Day As Needed for Diarrhea., Disp: , Rfl:     Multiple Vitamins-Minerals (CENTRUM SILVER ULTRA WOMENS) tablet, Take 1 tablet by mouth Daily., Disp: , Rfl:     Omega-3 Fatty Acids (FISH OIL) 1000 MG capsule capsule, Take 1 capsule by mouth Daily With Breakfast., Disp: , Rfl:     triamcinolone (KENALOG) 0.1 % ointment, Apply  topically to the appropriate area as directed 2 (Two) Times a Day As Needed for Irritation., Disp: 15 g, Rfl: 1    vitamin B-12 (CYANOCOBALAMIN) 100 MCG tablet, Take 0.5 tablets by mouth., Disp: , Rfl:     Current Facility-Administered Medications:     denosumab (PROLIA) syringe 60 mg, 60 mg, Subcutaneous, Q6 Months, Victor Manuel Wright MD, 60 mg at 06/28/23 1332    No problems with medications.    No Known Allergies    Review of Systems  GENERAL:  Active/slower with limits, speed, stamina for age and today. Sleep is ok. No fever now/recent.  SKIN: No rash/skin lesion of concern beyond if above.   ENDO:  No syncope, near or diaphoretic sweaty spells.  HEENT: No recent head injury; same on/off headache.   No vision change.  No significant hearing loss.  Ears without pain/drainage.  No sore throat.  No significant nasal/sinus congestion/drainage. No epistaxis.  CHEST: No chest wall tenderness or mass. No cough, without wheeze.  No SOB; no hemoptysis.  CV: No chest pain, palpitations, ankle edema.  GI: No heartburn, dysphagia.  No usual abdominal pain, diarrhea,  constipation-see above.  No rectal bleeding, or melena.    :  Voids with on/off recent dysuria,  without incontinence to completion.  ORTHO: No painful/swollen joints but various on /off sore.  No sore neck or back.  No acute neck or back pain without recent injury.  NEURO: No dizziness, weakness of extremities.  No numbness/paresthesias.   PSYCH: No memory loss.  Mood good; not anxious, depressed but/and not suicidal.  Tries to tolerate stress .   Screening:  Mammogram: 8.16.22 [id number] says done   Bone density:   8.16.22/Bone d-deca/[facility]/LS -1.3 hip -1.5/8.16.22-prolia-using  7.24.20 bone d-deca/[facility]/LS -2.6 hip -1.7/7.24.20; qualifies for boniva  GI:   EGD-Inflammatory change of bulb/[facility]//[facility]/08-24-05  H pylori/[facility]/8-24-05  Colon-div//[facility]/9.12.22/prn  Prostate: NA  Usual lab order  12m CBC, CMP, LIPID, TSH, Vit D, iron, B12, folate    Copy/paste function used for ROS/exam AND each area of these were reviewed, updated, confirmed and supplemented as needed.  Data reviewed:   Recent admit/ER/MD visits: 10.25.23    1. Bad odor of urine    2. Dysuria          Data review above:   Discussions/medical decisions/reviews:  BP ok  Other vitals ok  Recent Vitals           2/14/2023 6/28/2023 10/25/2023          BP: 124/68 140/76 122/78       Pulse: 86 77 96       Temp: 96.8 °F (36 °C) 98.6 °F (37 °C) 97.1 °F (36.2 °C)       Weight: 61.2 kg (135 lb) 59.9 kg (132 lb) 61.3 kg (135 lb 3.2 oz)       BMI (Calculated): 24.1 23.6 24.1               Screening reviewed/updated reminded mammogram  Trial emperic estrogen cream; ? Differential includes atrophic vaginitis  First week daily apply then twice a week  Discussed difference atrophic vaginitis/UTI  Encourage 60 oz f/uid/24 hr     Follow up: Return for as planned 1.2024.         Future Appointments   Date Time Provider Department Center   1/2/2024  2:30 PM Victor Manuel Wright MD [facility]         Data review above:   Rx: reviewed and decisions:   Rx  new/changes: new       New Medications Ordered This Visit   Medications    estradiol (ESTRACE VAGINAL) 0.1 MG/GM vaginal cream       Sig: Apply lightly to area urine comes out as directed       Dispense:  1 each       Refill:  0       Last cardiac testing:   Echo:     Radiology considered:   No radiology results for the last 90 days.    Lab Results:  Results for orders placed or performed in visit on 12/29/23   Comprehensive Metabolic Panel    Specimen: Blood   Result Value Ref Range    Glucose 93 65 - 99 mg/dL    BUN 14 8 - 23 mg/dL    Creatinine 0.77 0.57 - 1.00 mg/dL    EGFR Result 75.2 >60.0 mL/min/1.73    BUN/Creatinine Ratio 18.2 7.0 - 25.0    Sodium 138 136 - 145 mmol/L    Potassium 4.6 3.5 - 5.2 mmol/L    Chloride 99 98 - 107 mmol/L    Total CO2 25.8 22.0 - 29.0 mmol/L    Calcium 9.8 8.6 - 10.5 mg/dL    Total Protein 7.0 6.0 - 8.5 g/dL    Albumin 4.4 3.5 - 5.2 g/dL    Globulin 2.6 gm/dL    A/G Ratio 1.7 g/dL    Total Bilirubin 0.3 0.0 - 1.2 mg/dL    Alkaline Phosphatase 100 39 - 117 U/L    AST (SGOT) 36 (H) 1 - 32 U/L    ALT (SGPT) 21 1 - 33 U/L   Lipid Panel With / Chol / HDL Ratio    Specimen: Blood   Result Value Ref Range    Total Cholesterol 198 0 - 200 mg/dL    Triglycerides 154 (H) 0 - 150 mg/dL    HDL Cholesterol 59 40 - 60 mg/dL    VLDL Cholesterol Bruce 27 5 - 40 mg/dL    LDL Chol Calc (NIH) 112 (H) 0 - 100 mg/dL    Chol/HDL Ratio 3.36    TSH    Specimen: Blood   Result Value Ref Range    TSH 2.130 0.270 - 4.200 uIU/mL   Vitamin D,25-Hydroxy    Specimen: Blood   Result Value Ref Range    25 Hydroxy, Vitamin D 64.2 30.0 - 100.0 ng/ml   Vitamin B12    Specimen: Blood   Result Value Ref Range    Vitamin B-12 >2000 (H) 211 - 946 pg/mL   Folate    Specimen: Blood   Result Value Ref Range    Folate 19.50 4.78 - 24.20 ng/mL   Iron    Specimen: Blood   Result Value Ref Range    Iron 74 37 - 145 mcg/dL   CBC & Differential    Specimen: Blood   Result Value Ref Range    WBC 7.84 3.40 - 10.80 10*3/mm3    RBC  "4.89 3.77 - 5.28 10*6/mm3    Hemoglobin 15.1 12.0 - 15.9 g/dL    Hematocrit 45.7 34.0 - 46.6 %    MCV 93.5 79.0 - 97.0 fL    MCH 30.9 26.6 - 33.0 pg    MCHC 33.0 31.5 - 35.7 g/dL    RDW 13.0 12.3 - 15.4 %    Platelets 350 140 - 450 10*3/mm3    Neutrophil Rel % 50.8 42.7 - 76.0 %    Lymphocyte Rel % 30.2 19.6 - 45.3 %    Monocyte Rel % 8.4 5.0 - 12.0 %    Eosinophil Rel % 8.5 (H) 0.3 - 6.2 %    Basophil Rel % 1.7 (H) 0.0 - 1.5 %    Neutrophils Absolute 3.98 1.70 - 7.00 10*3/mm3    Lymphocytes Absolute 2.37 0.70 - 3.10 10*3/mm3    Monocytes Absolute 0.66 0.10 - 0.90 10*3/mm3    Eosinophils Absolute 0.67 (H) 0.00 - 0.40 10*3/mm3    Basophils Absolute 0.13 0.00 - 0.20 10*3/mm3    Immature Granulocyte Rel % 0.4 0.0 - 0.5 %    Immature Grans Absolute 0.03 0.00 - 0.05 10*3/mm3    nRBC 0.0 0.0 - 0.2 /100 WBC       A1C:No results for input(s): \"HGBA1C\" in the last 52142 hours.  GLUCOSE:  Lab Results - Last 18 Months   Lab Units 12/29/23  0828 12/21/22  1348   GLUCOSE mg/dL 93 94     LIPID:  Lab Results - Last 18 Months   Lab Units 12/29/23  0828 12/21/22  1348   CHOLESTEROL mg/dL 198 217*   LDL CHOL mg/dL 112* 123*   HDL CHOL mg/dL 59 67*   TRIGLYCERIDES mg/dL 154* 157*     PSA:No results found for: \"PSA\"      CBC:  Lab Results - Last 18 Months   Lab Units 12/29/23  0828 06/21/23  0810 12/21/22  1348   WBC 10*3/mm3 7.84 7.01 9.61   HEMOGLOBIN g/dL 15.1 14.8 15.2   HEMATOCRIT % 45.7 44.0 45.1   PLATELETS 10*3/mm3 350 318 331   IRON mcg/dL 74 108 153*   VITAMIN B 12 pg/mL >2000*  --  1,095*   FOLATE ng/mL 19.50  --  >20.00     BMP/CMP:  Lab Results - Last 18 Months   Lab Units 12/29/23  0828 12/21/22  1348   SODIUM mmol/L 138 138   POTASSIUM mmol/L 4.6 4.8   CHLORIDE mmol/L 99 98   CO2 mmol/L 25.8 27.5   GLUCOSE mg/dL 93 94   BUN mg/dL 14 15   CREATININE mg/dL 0.77 0.72   EGFR RESULT mL/min/1.73 75.2 82.1   CALCIUM mg/dL 9.8 10.0       HEPATIC:  Lab Results - Last 18 Months   Lab Units 12/29/23  0828 12/21/22  1348   ALT " "(SGPT) U/L 21 19   AST (SGOT) U/L 36* 44*   ALK PHOS U/L 100 100     HEPATITIS C ANTIBODY: No results found for: \"HEPCVIRUSABY\"  Vit D:  Lab Results - Last 18 Months   Lab Units 12/29/23  0828 12/21/22  1348   VIT D 25 HYDROXY ng/ml 64.2 69.1     THYROID:  Lab Results - Last 18 Months   Lab Units 12/29/23  0828 12/21/22  1348   TSH uIU/mL 2.130 1.730       Objective   /78   Pulse 84   Temp 96.9 °F (36.1 °C) (Temporal)   Resp 18   Ht 159.4 cm (62.75\")   Wt 62.1 kg (137 lb)   SpO2 96%   BMI 24.46 kg/m²   Body mass index is 24.46 kg/m².    Recent Vitals         2/14/2023 6/28/2023 10/25/2023       BP: 124/68 140/76 122/78     Pulse: 86 77 96     Temp: 96.8 °F (36 °C) 98.6 °F (37 °C) 97.1 °F (36.2 °C)     Weight: 61.2 kg (135 lb) 59.9 kg (132 lb) 61.3 kg (135 lb 3.2 oz)     BMI (Calculated): 24.1 23.6 24.1           Wt Readings from Last 15 Encounters:   01/02/24 1447 62.1 kg (137 lb)   10/25/23 1317 61.3 kg (135 lb 3.2 oz)   06/28/23 1120 59.9 kg (132 lb)   02/14/23 1435 61.2 kg (135 lb)   12/28/22 1316 61.2 kg (135 lb)   08/09/22 1255 59 kg (130 lb)   06/23/22 1310 61.2 kg (135 lb)   06/21/22 1400 62.3 kg (137 lb 6.4 oz)   06/23/21 1257 60.8 kg (134 lb)   06/23/20 1327 60.8 kg (134 lb)   05/10/18 1359 57.2 kg (126 lb)   05/09/17 1405 62.6 kg (138 lb)       Physical Exam  GENERAL:  Well nourished/developed in no acute distress.   SKIN: Turgor excellent, without wound, rash, lesion  HEENT: Normal cephalic without trauma.  Pupils equal round reactive to light. Extraocular motions full without nystagmus.   External canals nonobstructive nontender without reddness. Tymphatic membranes cleve with edmund structures intact.   Oral cavity without growths, exudates, and moist.  Posterior pharynx without mass, obstruction, redness.  No thyromegaly, mass, tenderness, lymphadenopathy and supple.  CV: Regular rhythm.  No murmur, gallop,  edema. Posterior pulses intact.  No carotid bruits.  CHEST: No chest wall " tenderness or mass.   LUNGS: Symmetric motion with clear to auscultation.    ABD: Soft, nontender without mass.   ORTHO: Symmetric extremities without swelling/point tenderness.  Full gross range of motion.  NEURO: CN 2-12 grossly intact.  Symmetric facies and UE/LE. 3/5 strength throughout. 1/4 x bicep  equal reflexes.  Nonfocal use extremities. Speech clear. Intact light touch with monofilament, vibratory sensation with tuning fork; equal toes/distal feet.    PSYCH: Oriented x 3.  Pleasant calm, well kept.  Purposeful/directed conservation with intact short/long gross memory    Assessment & Plan     1. Primary hypertension    2. Congestive heart failure, unspecified HF chronicity, unspecified heart failure type    3. Anticoagulated prevention/ASA 81    4. Anemia, unspecified type    5. Age related osteoporosis, unspecified pathological fracture presence    6. Osteoarthritis, unspecified osteoarthritis type, unspecified site    7. Multiple chronic diseases    8. Atrophic vaginitis    9. Bilateral hearing loss, unspecified hearing loss type        Data review above:   Discussions/medical decisions/reviews:  BP ok  Other vitals ok  Recent Vitals         2/14/2023 6/28/2023 10/25/2023       BP: 124/68 140/76 122/78     Pulse: 86 77 96     Temp: 96.8 °F (36 °C) 98.6 °F (37 °C) 97.1 °F (36.2 °C)     Weight: 61.2 kg (135 lb) 59.9 kg (132 lb) 61.3 kg (135 lb 3.2 oz)     BMI (Calculated): 24.1 23.6 24.1           DM/BS 93 12.29.23  Lipid  12.29.23; lifestyle  PSA NA  CBC ok 12.29.23  Iron 74N 12.29.23  B12 > 2000 12.29.23-oral  Folate 19.5 12.29.23  Renal ok 12.29.23  Liver AST 36 12.23; stable  Vit D 64 12.29.23  Thyroid TSh 2.13N 12.29.23    Screening reviewed/updated -check on mammogram patient says done  Vaccines discussed (see below) -she will work with MD2 and get shingles  Advised if missed an earlier COVID; since had 10.2023 to not worry  Pro/con continue prolia; agreed    Follow up: Return for lab/provider  "6m.  No future appointments.    Data review above:   Rx: reviewed and decisions:   Rx new/changes: none  No orders of the defined types were placed in this encounter.    Orders placed:   LAB/Testing/Referrals: reviewed/orders:   Today:   No orders of the defined types were placed in this encounter.    Chronic/recurrent labs above or change to:   Same     Immunization History   Administered Date(s) Administered    ABRYSVO (RSV, 60+ or pregnant women 32-36 wks) 11/29/2023    COVID-19 (MODERNA) 1st,2nd,3rd Dose Monovalent 02/25/2021, 03/25/2021    COVID-19 (UNSPECIFIED) 10/18/2023    Fluzone High Dose =>65 Years ([employer] ONLY) 11/07/2019, 10/18/2023    Fluzone Quad >6mos (Multi-dose) 11/23/2016, 10/31/2017    Influenza Quad Vaccine (Inpatient) 10/31/2017    Influenza, Unspecified 10/05/2022    PEDS-Pneumococcal Conjugate (PCV7) 10/09/2009    Pneumococcal Conjugate 13-Valent (PCV13) 10/09/2009, 10/21/2015    Pneumococcal Conjugate 20-Valent (PCV20) 12/28/2022    Pneumococcal Conjugate Unspecified 10/09/2009    Pneumococcal, Unspecified 10/09/2009    Tdap 06/23/2021     We advised/reaffirmed our support/suggestion for staying complete with covid- covid boosters, seasonal flu/yearly and any missing vaccine from list we supplied; when cannot be given here we suggest contact with local health department office or pharmacy to review missing/needed vaccines and then bring nursing documentation for these vaccines to this office or call this information in. Shingles became \"free\" 1.1.23 for medicare insurance.    Health maintenance:   Body mass index is 24.46 kg/m².  BMI is within normal parameters. No other follow-up for BMI required.      Tobacco use reviewed:   Hillary Overton  reports that she quit smoking about 34 years ago. Her smoking use included cigarettes. She started smoking about 68 years ago. She has a 17.00 pack-year smoking history. She has never used smokeless tobacco..    There are no Patient Instructions on file " for this visit.

## 2024-01-08 ENCOUNTER — TELEPHONE (OUTPATIENT)
Dept: FAMILY MEDICINE CLINIC | Facility: CLINIC | Age: 89
End: 2024-01-08

## 2024-01-08 ENCOUNTER — CLINICAL SUPPORT (OUTPATIENT)
Dept: FAMILY MEDICINE CLINIC | Facility: CLINIC | Age: 89
End: 2024-01-08
Payer: MEDICARE

## 2024-01-08 DIAGNOSIS — M81.0 AGE RELATED OSTEOPOROSIS, UNSPECIFIED PATHOLOGICAL FRACTURE PRESENCE: Primary | ICD-10-CM

## 2024-01-08 PROCEDURE — 96372 THER/PROPH/DIAG INJ SC/IM: CPT | Performed by: FAMILY MEDICINE

## 2024-01-08 NOTE — TELEPHONE ENCOUNTER
Caller: Hillary Overton    Relationship to patient: Self    Best call back number:     [phone] (Home), REQUESTING A CALLBACK     Type of visit: NURSE/MA    Requested date:AFTERNOONS    Additional notes: THE PATIENT STATES THAT SHE WOULD LIKE TO SEE IF HER PROLIA SHOT HAS ARRIVED AT THE OFFICE AND STATES THAT SHE WOULD LIKE TO GET THE NURSE VISIT SCHEDULED FOR THE PROLIA INJECTION.

## 2024-01-25 DIAGNOSIS — R30.0 DYSURIA: ICD-10-CM

## 2024-01-25 DIAGNOSIS — R82.90 BAD ODOR OF URINE: ICD-10-CM

## 2024-01-25 RX ORDER — ESTRADIOL 0.1 MG/G
CREAM VAGINAL
Qty: 42.5 G | Refills: 0 | Status: SHIPPED | OUTPATIENT
Start: 2024-01-25

## 2024-03-16 DIAGNOSIS — I10 PRIMARY HYPERTENSION: ICD-10-CM

## 2024-03-18 RX ORDER — LISINOPRIL 10 MG/1
10 TABLET ORAL DAILY
Qty: 90 TABLET | Refills: 1 | Status: SHIPPED | OUTPATIENT
Start: 2024-03-18

## 2024-04-17 ENCOUNTER — OFFICE VISIT (OUTPATIENT)
Dept: FAMILY MEDICINE CLINIC | Facility: CLINIC | Age: 89
End: 2024-04-17
Payer: MEDICARE

## 2024-04-17 ENCOUNTER — HOSPITAL ENCOUNTER (OUTPATIENT)
Dept: CT IMAGING | Facility: HOSPITAL | Age: 89
Discharge: HOME | End: 2024-04-17
Admitting: FAMILY MEDICINE
Payer: MEDICARE

## 2024-04-17 VITALS
OXYGEN SATURATION: 96 % | WEIGHT: 136 LBS | DIASTOLIC BLOOD PRESSURE: 72 MMHG | BODY MASS INDEX: 24.1 KG/M2 | TEMPERATURE: 97.3 F | HEIGHT: 63 IN | HEART RATE: 76 BPM | SYSTOLIC BLOOD PRESSURE: 132 MMHG

## 2024-04-17 DIAGNOSIS — I50.9 CONGESTIVE HEART FAILURE, UNSPECIFIED HF CHRONICITY, UNSPECIFIED HEART FAILURE TYPE: ICD-10-CM

## 2024-04-17 DIAGNOSIS — M19.90 OSTEOARTHRITIS, UNSPECIFIED OSTEOARTHRITIS TYPE, UNSPECIFIED SITE: ICD-10-CM

## 2024-04-17 DIAGNOSIS — Z78.0 MENOPAUSE: ICD-10-CM

## 2024-04-17 DIAGNOSIS — I10 PRIMARY HYPERTENSION: Chronic | ICD-10-CM

## 2024-04-17 DIAGNOSIS — S06.0X0A CONCUSSION WITHOUT LOSS OF CONSCIOUSNESS, INITIAL ENCOUNTER: ICD-10-CM

## 2024-04-17 DIAGNOSIS — M81.0 AGE RELATED OSTEOPOROSIS, UNSPECIFIED PATHOLOGICAL FRACTURE PRESENCE: ICD-10-CM

## 2024-04-17 DIAGNOSIS — S00.03XA CONTUSION OF SCALP, INITIAL ENCOUNTER: ICD-10-CM

## 2024-04-17 DIAGNOSIS — Z12.31 ENCOUNTER FOR SCREENING MAMMOGRAM FOR BREAST CANCER: ICD-10-CM

## 2024-04-17 DIAGNOSIS — R51.9 NONINTRACTABLE HEADACHE, UNSPECIFIED CHRONICITY PATTERN, UNSPECIFIED HEADACHE TYPE: ICD-10-CM

## 2024-04-17 DIAGNOSIS — R69 MULTIPLE CHRONIC DISEASES: ICD-10-CM

## 2024-04-17 DIAGNOSIS — D64.9 ANEMIA, UNSPECIFIED TYPE: ICD-10-CM

## 2024-04-17 DIAGNOSIS — N95.2 ATROPHIC VAGINITIS: ICD-10-CM

## 2024-04-17 DIAGNOSIS — Z79.01 ANTICOAGULATED: ICD-10-CM

## 2024-04-17 LAB
ALBUMIN SERPL-MCNC: 4.2 G/DL (ref 3.5–5.2)
ALBUMIN/GLOB SERPL: 1.8 G/DL
ALP SERPL-CCNC: 106 U/L (ref 39–117)
ALT SERPL-CCNC: 18 U/L (ref 1–33)
AST SERPL-CCNC: 32 U/L (ref 1–32)
BASOPHILS # BLD AUTO: 0.09 10*3/MM3 (ref 0–0.2)
BASOPHILS NFR BLD AUTO: 1.2 % (ref 0–1.5)
BILIRUB SERPL-MCNC: 0.4 MG/DL (ref 0–1.2)
BUN SERPL-MCNC: 14 MG/DL (ref 8–23)
BUN/CREAT SERPL: 19.2 (ref 7–25)
CALCIUM SERPL-MCNC: 9.5 MG/DL (ref 8.6–10.5)
CHLORIDE SERPL-SCNC: 102 MMOL/L (ref 98–107)
CO2 SERPL-SCNC: 25.5 MMOL/L (ref 22–29)
CREAT SERPL-MCNC: 0.73 MG/DL (ref 0.57–1)
EGFRCR SERPLBLD CKD-EPI 2021: 80.2 ML/MIN/1.73
EOSINOPHIL # BLD AUTO: 0.4 10*3/MM3 (ref 0–0.4)
EOSINOPHIL NFR BLD AUTO: 5.4 % (ref 0.3–6.2)
ERYTHROCYTE [DISTWIDTH] IN BLOOD BY AUTOMATED COUNT: 13.1 % (ref 12.3–15.4)
GLOBULIN SER CALC-MCNC: 2.4 GM/DL
GLUCOSE SERPL-MCNC: 98 MG/DL (ref 65–99)
HCT VFR BLD AUTO: 42.9 % (ref 34–46.6)
HGB BLD-MCNC: 14.3 G/DL (ref 12–15.9)
IMM GRANULOCYTES # BLD AUTO: 0.02 10*3/MM3 (ref 0–0.05)
IMM GRANULOCYTES NFR BLD AUTO: 0.3 % (ref 0–0.5)
LYMPHOCYTES # BLD AUTO: 2 10*3/MM3 (ref 0.7–3.1)
LYMPHOCYTES NFR BLD AUTO: 27 % (ref 19.6–45.3)
MCH RBC QN AUTO: 30.6 PG (ref 26.6–33)
MCHC RBC AUTO-ENTMCNC: 33.3 G/DL (ref 31.5–35.7)
MCV RBC AUTO: 91.9 FL (ref 79–97)
MONOCYTES # BLD AUTO: 0.75 10*3/MM3 (ref 0.1–0.9)
MONOCYTES NFR BLD AUTO: 10.1 % (ref 5–12)
NEUTROPHILS # BLD AUTO: 4.14 10*3/MM3 (ref 1.7–7)
NEUTROPHILS NFR BLD AUTO: 56 % (ref 42.7–76)
NRBC BLD AUTO-RTO: 0 /100 WBC (ref 0–0.2)
PLATELET # BLD AUTO: 306 10*3/MM3 (ref 140–450)
POTASSIUM SERPL-SCNC: 4.3 MMOL/L (ref 3.5–5.2)
PROT SERPL-MCNC: 6.6 G/DL (ref 6–8.5)
RBC # BLD AUTO: 4.67 10*6/MM3 (ref 3.77–5.28)
SODIUM SERPL-SCNC: 137 MMOL/L (ref 136–145)
WBC # BLD AUTO: 7.4 10*3/MM3 (ref 3.4–10.8)

## 2024-04-17 PROCEDURE — 99214 OFFICE O/P EST MOD 30 MIN: CPT | Performed by: FAMILY MEDICINE

## 2024-04-17 PROCEDURE — 70450 CT HEAD/BRAIN W/O DYE: CPT
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
## 2024-04-18 NOTE — PROGRESS NOTES
Please call result -CBC and CMP is normal.    Electronically signed by MARIAH Jimenez, 04/18/24, 7:24 AM CDT.

## 2024-07-02 ENCOUNTER — TELEPHONE (OUTPATIENT)
Dept: FAMILY MEDICINE CLINIC | Facility: CLINIC | Age: 89
End: 2024-07-02
Payer: MEDICARE

## 2024-07-02 NOTE — TELEPHONE ENCOUNTER
[facility] ([facility]) called stating that they were needing to get a date to ship pt Prolia. Pt Prolia will be shipped for 7/9

## 2024-07-07 ENCOUNTER — HOSPITAL ENCOUNTER (EMERGENCY)
Age: 89
Discharge: HOME | End: 2024-07-07
Attending: EMERGENCY MEDICINE
Payer: MEDICARE

## 2024-07-07 ENCOUNTER — APPOINTMENT (OUTPATIENT)
Dept: ULTRASOUND IMAGING | Age: 89
End: 2024-07-07
Payer: MEDICARE

## 2024-07-07 VITALS
SYSTOLIC BLOOD PRESSURE: 150 MMHG | DIASTOLIC BLOOD PRESSURE: 65 MMHG | HEART RATE: 63 BPM | BODY MASS INDEX: 24.1 KG/M2 | RESPIRATION RATE: 19 BRPM | WEIGHT: 136 LBS | HEIGHT: 63 IN | TEMPERATURE: 98.1 F | OXYGEN SATURATION: 95 %

## 2024-07-07 DIAGNOSIS — K80.20 CALCULUS OF GALLBLADDER WITHOUT CHOLECYSTITIS WITHOUT OBSTRUCTION: Primary | ICD-10-CM

## 2024-07-07 LAB
ALBUMIN SERPL-MCNC: 3.8 G/DL (ref 3.5–5.2)
ALP SERPL-CCNC: 103 U/L (ref 35–104)
ALT SERPL-CCNC: 16 U/L (ref 5–33)
ANION GAP SERPL CALCULATED.3IONS-SCNC: 13 MMOL/L (ref 7–19)
AST SERPL-CCNC: 32 U/L (ref 5–32)
BASOPHILS # BLD: 0.1 K/UL (ref 0–0.2)
BASOPHILS NFR BLD: 0.6 % (ref 0–1)
BILIRUB SERPL-MCNC: 0.7 MG/DL (ref 0.2–1.2)
BUN SERPL-MCNC: 10 MG/DL (ref 8–23)
CALCIUM SERPL-MCNC: 8.7 MG/DL (ref 8.8–10.2)
CHLORIDE SERPL-SCNC: 99 MMOL/L (ref 98–111)
CO2 SERPL-SCNC: 24 MMOL/L (ref 22–29)
CREAT SERPL-MCNC: 0.6 MG/DL (ref 0.5–0.9)
EOSINOPHIL # BLD: 0.1 K/UL (ref 0–0.6)
EOSINOPHIL NFR BLD: 0.7 % (ref 0–5)
ERYTHROCYTE [DISTWIDTH] IN BLOOD BY AUTOMATED COUNT: 13.5 % (ref 11.5–14.5)
GLUCOSE SERPL-MCNC: 113 MG/DL (ref 74–109)
HCT VFR BLD AUTO: 41.2 % (ref 37–47)
HGB BLD-MCNC: 13.4 G/DL (ref 12–16)
IMM GRANULOCYTES # BLD: 0 K/UL
LYMPHOCYTES # BLD: 1.9 K/UL (ref 1.1–4.5)
LYMPHOCYTES NFR BLD: 17.5 % (ref 20–40)
MCH RBC QN AUTO: 30.2 PG (ref 27–31)
MCHC RBC AUTO-ENTMCNC: 32.5 G/DL (ref 33–37)
MCV RBC AUTO: 93 FL (ref 81–99)
MONOCYTES # BLD: 0.7 K/UL (ref 0–0.9)
MONOCYTES NFR BLD: 6.2 % (ref 0–10)
NEUTROPHILS # BLD: 8.2 K/UL (ref 1.5–7.5)
NEUTS SEG NFR BLD: 74.6 % (ref 50–65)
PLATELET # BLD AUTO: 289 K/UL (ref 130–400)
PMV BLD AUTO: 9.2 FL (ref 9.4–12.3)
POTASSIUM SERPL-SCNC: 3.8 MMOL/L (ref 3.5–5)
PROT SERPL-MCNC: 6 G/DL (ref 6.6–8.7)
RBC # BLD AUTO: 4.43 M/UL (ref 4.2–5.4)
SODIUM SERPL-SCNC: 136 MMOL/L (ref 136–145)
WBC # BLD AUTO: 11 K/UL (ref 4.8–10.8)

## 2024-07-07 PROCEDURE — 99284 EMERGENCY DEPT VISIT MOD MDM: CPT

## 2024-07-07 PROCEDURE — 36415 COLL VENOUS BLD VENIPUNCTURE: CPT

## 2024-07-07 PROCEDURE — 76705 ECHO EXAM OF ABDOMEN: CPT

## 2024-07-07 PROCEDURE — 80053 COMPREHEN METABOLIC PANEL: CPT

## 2024-07-07 PROCEDURE — 85025 COMPLETE CBC W/AUTO DIFF WBC: CPT

## 2024-07-07 ASSESSMENT — PAIN SCALES - GENERAL: PAINLEVEL_OUTOF10: 3

## 2024-07-07 ASSESSMENT — PAIN DESCRIPTION - DESCRIPTORS: DESCRIPTORS: CRAMPING

## 2024-07-07 ASSESSMENT — PAIN - FUNCTIONAL ASSESSMENT: PAIN_FUNCTIONAL_ASSESSMENT: 0-10

## 2024-07-07 ASSESSMENT — PAIN DESCRIPTION - LOCATION: LOCATION: ABDOMEN

## 2024-07-07 ASSESSMENT — PAIN DESCRIPTION - ORIENTATION: ORIENTATION: RIGHT;LEFT;UPPER;LOWER

## 2024-07-07 NOTE — ED PROVIDER NOTES
Eyes:      General: No scleral icterus.     Pupils: Pupils are equal, round, and reactive to light.   Neck:      Trachea: No tracheal deviation.   Cardiovascular:      Rate and Rhythm: Normal rate and regular rhythm.      Pulses: Normal pulses.      Heart sounds: Normal heart sounds. No murmur heard.  Pulmonary:      Effort: Pulmonary effort is normal. No respiratory distress.      Breath sounds: Normal breath sounds. No stridor.   Abdominal:      General: There is no distension.      Palpations: Abdomen is soft.      Tenderness: There is abdominal tenderness (mild RUQ tenderness, no guarding).   Musculoskeletal:      Right lower leg: No edema.      Left lower leg: No edema.   Skin:     Capillary Refill: Capillary refill takes less than 2 seconds.      Coloration: Skin is not pale.      Findings: No rash.   Neurological:      General: No focal deficit present.      Mental Status: She is alert and oriented to person, place, and time.   Psychiatric:         Behavior: Behavior is cooperative.         DIAGNOSTIC RESULTS       RADIOLOGY:  Non-plain film images such as CT, Ultrasound and MRI are read by the radiologist. Plain radiographic images are visualized and preliminarily interpreted bythe emergency physician with the below findings:      US GALLBLADDER RUQ   Final Result       Cholelithiasis   2.  The gallbladder wall is thickened measuring 5 mm may represent  cholecystitis               ______________________________________    Electronically signed by: ANEL RAMÍREZ M.D.   Date:     07/07/2024   Time:    12:47               LABS:  Labs Reviewed   COMPREHENSIVE METABOLIC PANEL - Abnormal; Notable for the following components:       Result Value    Glucose 113 (*)     Calcium 8.7 (*)     Total Protein 6.0 (*)     All other components within normal limits   CBC WITH AUTO DIFFERENTIAL - Abnormal; Notable for the following components:    WBC 11.0 (*)     MCHC 32.5 (*)     MPV 9.2 (*)     Neutrophils % 74.6 (*)      Lymphocytes % 17.5 (*)     Neutrophils Absolute 8.2 (*)     All other components within normal limits       All other labs were within normal range or not returned as of this dictation.    EMERGENCY DEPARTMENT COURSE and DIFFERENTIAL DIAGNOSIS/MDM:   Vitals:    Vitals:    07/07/24 1233 07/07/24 1313 07/07/24 1333 07/07/24 1403   BP: (!) 140/68 (!) 152/67 132/68 (!) 150/65   Pulse: 61 64 61 63   Resp: 16 19     Temp:       TempSrc:       SpO2: 94% 95% 95% 95%   Weight:       Height:           MDM    I have independently reviewed patient's laboratory studies, radiographic imaging along with surgical consultation and discussion here in the emergency department.  Case has been reviewed and discussed with Dr. Banks who saw patient here in the emergency department and has reviewed her CT imaging and ultrasound imaging.  Plan of care for this patient will include discharge home and close outpatient surgical follow-up for elective cholecystectomy.  Return precautions were reviewed discussed and all questions were answered.      PROCEDURES:  Unless otherwise noted below, none     Procedures    FINAL IMPRESSION      1. Calculus of gallbladder without cholecystitis without obstruction          DISPOSITION/PLAN   DISPOSITION Decision To Discharge 07/07/2024 02:58:49 PM      PATIENT REFERRED TO:  Alice Mora DO  [address] [address]  [phone]            DISCHARGE MEDICATIONS:  Discharge Medication List as of 7/7/2024  3:01 PM             (Please note that portions of this note were completed with a voice recognition program.  Efforts were made to edit thedictations but occasionally words are mis-transcribed.)    Gregorio Lang MD (electronically signed)  Attending Emergency Physician          Gregorio Lang MD  07/14/24 [id number]

## 2024-07-07 NOTE — ED NOTES
Erin Banks, General Surgery, for Dr. Lang   
Per report from [facility] pt arrived with c/o RUQ pain radiating to back that started yesterday. CT suspected acute cholecystitis and was sent here for further gallbladder u/s. Pt received fluids and these meds pta:  Zosyn 3.375   IM toradol 15mg  Pantoprazole 40mg  Mylanta 15mL  
no

## 2024-07-10 ENCOUNTER — CLINICAL SUPPORT (OUTPATIENT)
Dept: FAMILY MEDICINE CLINIC | Facility: CLINIC | Age: 89
End: 2024-07-10
Payer: MEDICARE

## 2024-07-10 PROCEDURE — 96372 THER/PROPH/DIAG INJ SC/IM: CPT | Performed by: FAMILY MEDICINE

## 2024-07-16 ENCOUNTER — OFFICE VISIT (OUTPATIENT)
Dept: SURGERY | Age: 89
End: 2024-07-16
Payer: MEDICARE

## 2024-07-16 ENCOUNTER — PREP FOR PROCEDURE (OUTPATIENT)
Dept: SURGERY | Age: 89
End: 2024-07-16

## 2024-07-16 VITALS
HEIGHT: 63 IN | WEIGHT: 132 LBS | OXYGEN SATURATION: 97 % | HEART RATE: 87 BPM | BODY MASS INDEX: 23.39 KG/M2 | TEMPERATURE: 97.7 F

## 2024-07-16 DIAGNOSIS — K80.10 CALCULUS OF GALLBLADDER WITH CHRONIC CHOLECYSTITIS WITHOUT OBSTRUCTION: Primary | ICD-10-CM

## 2024-07-16 PROCEDURE — 1123F ACP DISCUSS/DSCN MKR DOCD: CPT | Performed by: SURGERY

## 2024-07-16 PROCEDURE — 99204 OFFICE O/P NEW MOD 45 MIN: CPT | Performed by: SURGERY

## 2024-07-16 RX ORDER — LISINOPRIL 10 MG/1
10 TABLET ORAL DAILY
COMMUNITY

## 2024-07-16 RX ORDER — DENOSUMAB 60 MG/ML
60 INJECTION SUBCUTANEOUS ONCE
COMMUNITY
Start: 2022-07-12

## 2024-07-16 NOTE — H&P (VIEW-ONLY)
SUBJECTIVE:  Ms. Dan is a 88 y.o. female who presents today with reports of abdominal pain that is intermittent and at times severe. The pain is sharp and in the upper abdomen that radiates to her back. It is following meals. She was evaluated and found to have cholelithiasis and would martinez to discuss cholecystectomy.       Past Medical History:   Diagnosis Date    Hearing aid worn     Hearing loss     Hypertension      Past Surgical History:   Procedure Laterality Date    APPENDECTOMY      BREAST BIOPSY  2008    L -  benign    HYSTERECTOMY (CERVIX STATUS UNKNOWN)       Current Outpatient Medications   Medication Sig Dispense Refill    lisinopril (PRINIVIL;ZESTRIL) 10 MG tablet Take 1 tablet by mouth daily      diclofenac sodium (VOLTAREN) 1 % GEL Apply 4 g topically 4 times daily as needed      PROLIA 60 MG/ML SOSY SC injection Inject 1 mL into the skin      vitamin B-12 (CYANOCOBALAMIN) 100 MCG tablet Take 0.5 tablets by mouth daily      Multiple Vitamins-Minerals (CENTRUM SILVER ULTRA WOMENS) TABS Take 1 tablet by mouth      aspirin 81 MG EC tablet Take 1 tablet by mouth daily      fish oil-omega-3 fatty acids 1000 MG capsule Take 2 capsules by mouth daily      Calcium Citrate-Vitamin D 200-200 MG-UNIT TABS Take  by mouth daily.         No current facility-administered medications for this visit.     Allergies: Patient has no known allergies.    No family history on file.    Social History     Tobacco Use    Smoking status: Former    Smokeless tobacco: Never   Substance Use Topics    Alcohol use: No       Review of Systems   Constitutional:  Positive for activity change and appetite change.   Gastrointestinal:  Positive for abdominal pain.       OBJECTIVE:  Pulse 87   Temp 97.7 °F (36.5 °C) (Temporal)   Ht 1.588 m (5' 2.5\")   Wt 59.9 kg (132 lb)   SpO2 97%   BMI 23.76 kg/m²   Physical Exam  Constitutional:       General: She is not in acute distress.     Appearance: Normal appearance. She is normal weight.

## 2024-07-17 ENCOUNTER — OFFICE VISIT (OUTPATIENT)
Dept: FAMILY MEDICINE CLINIC | Facility: CLINIC | Age: 89
End: 2024-07-17
Payer: MEDICARE

## 2024-07-17 VITALS
WEIGHT: 131 LBS | HEART RATE: 80 BPM | OXYGEN SATURATION: 96 % | DIASTOLIC BLOOD PRESSURE: 74 MMHG | TEMPERATURE: 97.5 F | SYSTOLIC BLOOD PRESSURE: 136 MMHG | BODY MASS INDEX: 23.21 KG/M2 | HEIGHT: 63 IN

## 2024-07-17 DIAGNOSIS — I50.9 CONGESTIVE HEART FAILURE, UNSPECIFIED HF CHRONICITY, UNSPECIFIED HEART FAILURE TYPE: ICD-10-CM

## 2024-07-17 DIAGNOSIS — R69 MULTIPLE CHRONIC DISEASES: ICD-10-CM

## 2024-07-17 DIAGNOSIS — I10 PRIMARY HYPERTENSION: Chronic | ICD-10-CM

## 2024-07-17 DIAGNOSIS — M81.0 AGE RELATED OSTEOPOROSIS, UNSPECIFIED PATHOLOGICAL FRACTURE PRESENCE: ICD-10-CM

## 2024-07-17 DIAGNOSIS — K81.9 CHOLECYSTITIS: ICD-10-CM

## 2024-07-17 PROCEDURE — 1170F FXNL STATUS ASSESSED: CPT | Performed by: FAMILY MEDICINE

## 2024-07-17 PROCEDURE — 99214 OFFICE O/P EST MOD 30 MIN: CPT | Performed by: FAMILY MEDICINE

## 2024-07-17 PROCEDURE — 1126F AMNT PAIN NOTED NONE PRSNT: CPT | Performed by: FAMILY MEDICINE

## 2024-07-17 RX ORDER — ACETAMINOPHEN 325 MG/1
1000 TABLET ORAL ONCE
OUTPATIENT
Start: 2024-07-17 | End: 2024-07-17

## 2024-07-17 RX ORDER — SODIUM CHLORIDE 0.9 % (FLUSH) 0.9 %
5-40 SYRINGE (ML) INJECTION PRN
OUTPATIENT
Start: 2024-07-17

## 2024-07-17 RX ORDER — CELECOXIB 100 MG/1
100 CAPSULE ORAL ONCE
OUTPATIENT
Start: 2024-07-17 | End: 2024-07-17

## 2024-07-17 RX ORDER — SODIUM CHLORIDE 9 MG/ML
INJECTION, SOLUTION INTRAVENOUS PRN
OUTPATIENT
Start: 2024-07-17

## 2024-07-17 RX ORDER — SODIUM CHLORIDE 0.9 % (FLUSH) 0.9 %
5-40 SYRINGE (ML) INJECTION EVERY 12 HOURS SCHEDULED
OUTPATIENT
Start: 2024-07-17

## 2024-07-17 RX ORDER — SODIUM CHLORIDE, SODIUM LACTATE, POTASSIUM CHLORIDE, CALCIUM CHLORIDE 600; 310; 30; 20 MG/100ML; MG/100ML; MG/100ML; MG/100ML
INJECTION, SOLUTION INTRAVENOUS CONTINUOUS
OUTPATIENT
Start: 2024-07-17

## 2024-07-17 ASSESSMENT — ENCOUNTER SYMPTOMS: ABDOMINAL PAIN: 1

## 2024-07-17 NOTE — PROGRESS NOTES
ELIA”.   Subjective   Hillary Overton is a 88 y.o. female presenting with chief complaint of:   Chief Complaint   Patient presents with    Abdominal pain (wellness cancelled; too much other to do today)     AWV 6.28.23  Last Completed Annual Wellness Visit       This patient has no relevant Health Maintenance data.             History of Present Illness :  Alone.  Here for primarily RUQ pain.       Has multiple chronic problems to consider that might have a bearing on today's issues;  an interval appointment.       Chronic/acute problems reviewed today:   1. Primary hypertension Chronic/stable. Stable here past/no recent home blood pressures.  No significant chest pain, SOB, LE edema, orthopnea, near syncope, dizziness/light headness.   Recent Vitals         1/2/2024 4/17/2024 7/17/2024       BP: 118/78 132/72 136/74     Pulse: 84 76 80     Temp: 96.9 °F (36.1 °C) 97.3 °F (36.3 °C) 97.5 °F (36.4 °C)     Weight: 62.1 kg (137 lb) 61.7 kg (136 lb) 59.4 kg (131 lb)     BMI (Calculated): 24.5 24.3 23.4              2. Congestive heart failure, unspecified HF chronicity, unspecified heart failure type Chronic/stable.  Denies significant sob, orthopnea, leg edema, weight gain.  Aware of influence diet/salt and watching weight at home.       3. Age related osteoporosis, unspecified pathological fracture presence Chronic/stable.  Last bone density/if below.   Has Rx.  Ok further bone density screening when due.      4. Multiple chronic diseases Has multiple chronic problems with increased risks for management (involves polypharmacy, multiple providers now; many required labs/imaging/ to manage)     5. Cholecystitis chronic/worse.  Retrospective; she feels she has had this right upper quadrant pain off-and-on for a while.  Was in [facility] ER July 7; found to have a gallstone and some thickening of the gallbladder and was referred to the [facility] and still waiting surgery     Has an/another acute issue today:  none.    The following portions of the patient's history were reviewed and updated as appropriate: allergies, current medications, past family history, past medical history, past social history, past surgical history, and problem list.      Current Outpatient Medications:     aspirin 81 MG EC tablet, Take 1 tablet by mouth Daily., Disp: , Rfl:     B Complex Vitamins (B COMPLEX PO), Take 1 tablet by mouth Daily., Disp: , Rfl:     BIOTIN 5000 PO, Take 1 capsule by mouth Daily., Disp: , Rfl:     Calcium Carb-Cholecalciferol (CALCIUM-VITAMIN D) 600-400 MG-UNIT tablet, Take 1 tablet by mouth Daily., Disp: , Rfl:     denosumab (Prolia) 60 MG/ML solution prefilled syringe syringe, Inject 1 mL under the skin into the appropriate area as directed Every 6 (Six) Months., Disp: 1 mL, Rfl: 1    Diclofenac Sodium (Voltaren) 1 % gel gel, Apply 4 g topically to the appropriate area as directed 4 (Four) Times a Day As Needed (hand/hand joint pain)., Disp: , Rfl:     estradiol (ESTRACE) 0.1 MG/GM vaginal cream, APPLY LIGHTLY TO AREA WHERE URINE COMES OUT AS DIRECTED., Disp: 42.5 g, Rfl: 0    lisinopril (PRINIVIL,ZESTRIL) 10 MG tablet, TAKE 1 TABLET BY MOUTH DAILY, Disp: 90 tablet, Rfl: 1    loperamide (Imodium A-D) 2 MG tablet, Take 1 tablet by mouth 4 (Four) Times a Day As Needed for Diarrhea., Disp: , Rfl:     Multiple Vitamins-Minerals (CENTRUM SILVER ULTRA WOMENS) tablet, Take 1 tablet by mouth Daily., Disp: , Rfl:     Omega-3 Fatty Acids (FISH OIL) 1000 MG capsule capsule, Take 1 capsule by mouth Daily With Breakfast., Disp: , Rfl:     triamcinolone (KENALOG) 0.1 % ointment, Apply  topically to the appropriate area as directed 2 (Two) Times a Day As Needed for Irritation., Disp: 15 g, Rfl: 1    vitamin B-12 (CYANOCOBALAMIN) 100 MCG tablet, Take 0.5 tablets by mouth., Disp: , Rfl:     Current Facility-Administered Medications:     denosumab (PROLIA) syringe 60 mg, 60 mg, Subcutaneous, Q6 Months, Victor Manuel Wright MD, 60 mg at  07/10/24 1621    No problems with medications.    No Known Allergies    Review of Systems  GENERAL:  Active/slower with limits, speed, stamina for age and today. Sleep is ok. No fever now/recent.  SKIN: No rash/skin lesion of concern beyond if above.   ENDO:  No syncope, near or diaphoretic sweaty spells.  HEENT: No recent head injury; same on/off headache.   No vision change.  No significant hearing loss.  Ears without pain/drainage.  No sore throat.  No significant nasal/sinus congestion/drainage. No epistaxis.  CHEST: No chest wall tenderness or mass. No cough, without wheeze.  No SOB; no hemoptysis.  CV: No chest pain, palpitations, ankle edema.  GI: No heartburn, dysphagia.  No usual abdominal pain, diarrhea, constipation-see above.  No rectal bleeding, or melena.    :  Voids with on/off recent dysuria,  without incontinence to completion.  ORTHO: No painful/swollen joints but various on /off sore.  No sore neck or back.  No acute neck or back pain without recent injury.  NEURO: No dizziness, weakness of extremities.  No numbness/paresthesias.   PSYCH: No memory loss.  Mood good; not anxious, depressed but/and not suicidal.  Tries to tolerate stress .   Screening:  Mammogram: 8.16.22 LRM-xpnncsi-pye says done   Bone density:   8.16.22/Bone d-deca/[facility]/LS -1.3 hip -1.5/8.16.22-prolia-using  7.24.20 bone d-deca/[facility]/LS -2.6 hip -1.7/7.24.20; qualifies for boniva  GI:   EGD-Inflammatory change of bulb/[facility]//Khari/08-24-05  H pylori/Khari/8-24-05  Colon-div/Mc/[facility]/9.12.22/prn  Prostate: NA  Usual lab order  12m CBC, CMP, LIPID, TSH, Vit D, iron, B12, folate    Copy/paste function used for ROS/exam AND each area of these were reviewed, updated, confirmed and supplemented as needed.  Data reviewed:   Recent admit/ER/MD visits: 4.17.24  1. Primary hypertension    2. Congestive heart failure, unspecified HF chronicity, unspecified heart failure type    3. Anticoagulated prevention/ASA 81    4. Anemia, unspecified  type    5. Osteoarthritis, unspecified osteoarthritis type, unspecified site    6. Age related osteoporosis, unspecified pathological fracture presence    7. Multiple chronic diseases    8. Atrophic vaginitis    9. Contusion of scalp, initial encounter    10. Nonintractable headache, unspecified chronicity pattern, unspecified headache type    11. Concussion without loss of consciousness, initial encounter    12. Encounter for screening mammogram for breast cancer    13. Menopause          Data review above:   Discussions/medical decisions/reviews:  BP ok  Other vitals ok  Recent Vitals           10/25/2023 1/2/2024 4/17/2024          BP: 122/78 118/78 132/72       Pulse: 96 84 76       Temp: 97.1 °F (36.2 °C) 96.9 °F (36.1 °C) 97.3 °F (36.3 °C)       Weight: 61.3 kg (135 lb 3.2 oz) 62.1 kg (137 lb) 61.7 kg (136 lb)       BMI (Calculated): 24.1 24.5 24.3               DM/BS 93 12.29.23  Lipid  12.29.23; lifestyle  PSA NA  CBC ok 12.29.23  Iron 74N 12.29.23; none  B12 > 2000 12.29. oral  Folate 19.5 12.29.23  Renal ok 12.29.23  Liver AST 36 12.23; stable  Vit D 64 12.29.23  Thyroid TSh 2.13N 12.29.23     Screening reviewed/updated  both mammogram and bone density  Vaccines discussed (see below)   Pro/con CT head; strongly advised  Lab today; CBC, CMP    Last cardiac testing:   Echo:     Radiology considered:   US Gallbladder    Result Date: 7/7/2024  Cholelithiasis 2.  The gallbladder wall is thickened measuring 5 mm may represent  cholecystitis ______________________________________ Electronically signed by: RAFA CARRANZA M.D. Date:     07/07/2024 Time:    12:47      Lab Results:  Results for orders placed or performed in visit on 04/17/24   Comprehensive Metabolic Panel    Specimen: Blood   Result Value Ref Range    Glucose 98 65 - 99 mg/dL    BUN 14 8 - 23 mg/dL    Creatinine 0.73 0.57 - 1.00 mg/dL    EGFR Result 80.2 >60.0 mL/min/1.73    BUN/Creatinine Ratio 19.2 7.0 - 25.0    Sodium 137 136 - 145  "mmol/L    Potassium 4.3 3.5 - 5.2 mmol/L    Chloride 102 98 - 107 mmol/L    Total CO2 25.5 22.0 - 29.0 mmol/L    Calcium 9.5 8.6 - 10.5 mg/dL    Total Protein 6.6 6.0 - 8.5 g/dL    Albumin 4.2 3.5 - 5.2 g/dL    Globulin 2.4 gm/dL    A/G Ratio 1.8 g/dL    Total Bilirubin 0.4 0.0 - 1.2 mg/dL    Alkaline Phosphatase 106 39 - 117 U/L    AST (SGOT) 32 1 - 32 U/L    ALT (SGPT) 18 1 - 33 U/L   CBC & Differential    Specimen: Blood   Result Value Ref Range    WBC 7.40 3.40 - 10.80 10*3/mm3    RBC 4.67 3.77 - 5.28 10*6/mm3    Hemoglobin 14.3 12.0 - 15.9 g/dL    Hematocrit 42.9 34.0 - 46.6 %    MCV 91.9 79.0 - 97.0 fL    MCH 30.6 26.6 - 33.0 pg    MCHC 33.3 31.5 - 35.7 g/dL    RDW 13.1 12.3 - 15.4 %    Platelets 306 140 - 450 10*3/mm3    Neutrophil Rel % 56.0 42.7 - 76.0 %    Lymphocyte Rel % 27.0 19.6 - 45.3 %    Monocyte Rel % 10.1 5.0 - 12.0 %    Eosinophil Rel % 5.4 0.3 - 6.2 %    Basophil Rel % 1.2 0.0 - 1.5 %    Neutrophils Absolute 4.14 1.70 - 7.00 10*3/mm3    Lymphocytes Absolute 2.00 0.70 - 3.10 10*3/mm3    Monocytes Absolute 0.75 0.10 - 0.90 10*3/mm3    Eosinophils Absolute 0.40 0.00 - 0.40 10*3/mm3    Basophils Absolute 0.09 0.00 - 0.20 10*3/mm3    Immature Granulocyte Rel % 0.3 0.0 - 0.5 %    Immature Grans Absolute 0.02 0.00 - 0.05 10*3/mm3    nRBC 0.0 0.0 - 0.2 /100 WBC       A1C:No results for input(s): \"HGBA1C\" in the last 02612 hours.  GLUCOSE:  Lab Results - Last 18 Months   Lab Units 07/07/24  1049 04/17/24  0858 12/29/23  0828   GLUCOSE mg/dL 113* 98 93     LIPID:  Lab Results - Last 18 Months   Lab Units 12/29/23  0828   CHOLESTEROL mg/dL 198   LDL CHOL mg/dL 112*   HDL CHOL mg/dL 59   TRIGLYCERIDES mg/dL 154*     PSA:No results found for: \"PSA\"    CBC:  Lab Results - Last 18 Months   Lab Units 07/07/24  1049 04/17/24  0858 12/29/23  0828 06/21/23  0810   WBC K/uL 11.0* 7.40 7.84 7.01   HEMOGLOBIN g/dL 13.4 14.3 15.1 14.8   HEMATOCRIT % 41.2 42.9 45.7 44.0   PLATELETS K/uL 289 306 350 318   IRON mcg/dL  " "--   --  74 108   VITAMIN B 12 pg/mL  --   --  >2000*  --    FOLATE ng/mL  --   --  19.50  --      BMP/CMP:  Lab Results - Last 18 Months   Lab Units 07/07/24  1049 04/17/24  0858 12/29/23  0828   SODIUM mmol/L 136 137 138   POTASSIUM mmol/L 3.8 4.3 4.6   CHLORIDE mmol/L 99 102 99   TOTAL CO2 mmol/L 24  --   --    CO2 mmol/L  --  25.5 25.8   GLUCOSE mg/dL 113* 98 93   BUN mg/dL 10 14 14   CREATININE mg/dL 0.6 0.73 0.77   EGFR RESULT mL/min/1.73  --  80.2 75.2   CALCIUM mg/dL 8.7* 9.5 9.8       HEPATIC:  Lab Results - Last 18 Months   Lab Units 07/07/24  1049 04/17/24  0858 12/29/23  0828   ALT (SGPT) U/L 16 18 21   AST (SGOT) U/L 32 32 36*   ALK PHOS U/L 103 106 100     HEPATITIS C ANTIBODY: No results found for: \"HEPCVIRUSABY\"  Vit D:  Lab Results - Last 18 Months   Lab Units 12/29/23  0828   VIT D 25 HYDROXY ng/ml 64.2     THYROID:  Lab Results - Last 18 Months   Lab Units 12/29/23  0828   TSH uIU/mL 2.130       Objective   /74   Pulse 80   Temp 97.5 °F (36.4 °C)   Ht 159.4 cm (62.75\")   Wt 59.4 kg (131 lb)   SpO2 96%   BMI 23.39 kg/m²   Body mass index is 23.39 kg/m².    Recent Vitals         1/2/2024 4/17/2024 7/17/2024       BP: 118/78 132/72 136/74     Pulse: 84 76 80     Temp: 96.9 °F (36.1 °C) 97.3 °F (36.3 °C) 97.5 °F (36.4 °C)     Weight: 62.1 kg (137 lb) 61.7 kg (136 lb) 59.4 kg (131 lb)     BMI (Calculated): 24.5 24.3 23.4             Physical Exam  GENERAL:  Well nourished/developed in no acute distress.   SKIN: Turgor excellent, without wound, rash, lesion  HEENT: Normal cephalic without trauma.  Pupils equal round reactive to light. Extraocular motions full without nystagmus.   External canals nonobstructive nontender without reddness. Tymphatic membranes cleve with edmund structures intact.   Oral cavity without growths, exudates, and moist.  Posterior pharynx without mass, obstruction, redness.  No thyromegaly, mass, tenderness, lymphadenopathy and supple.  CV: Regular rhythm.  No murmur, " gallop,  edema. Posterior pulses intact.  No carotid bruits.  CHEST: No chest wall tenderness or mass.   LUNGS: Symmetric motion with clear to auscultation.    ABD: Soft, but mildly tender BRETT/RUQ-tender without mass or guarding/rebound.   ORTHO: Symmetric extremities without swelling/point tenderness.  Full gross range of motion.  NEURO: CN 2-12 grossly intact.  Symmetric facies and UE/LE. 3/5 strength throughout. 1/4 x bicep  equal reflexes.  Nonfocal use extremities. Speech clear.   PSYCH: Oriented x 3.  Pleasant calm, well kept.  Purposeful/directed conservation with intact short/long gross memory    Assessment & Plan     1. Primary hypertension    2. Congestive heart failure, unspecified HF chronicity, unspecified heart failure type    3. Age related osteoporosis, unspecified pathological fracture presence    4. Multiple chronic diseases    5. Cholecystitis        Data review above:   Discussions/medical decisions/reviews:  Recent Vitals         1/2/2024 4/17/2024 7/17/2024       BP: 118/78 132/72 136/74     Pulse: 84 76 80     Temp: 96.9 °F (36.1 °C) 97.3 °F (36.3 °C) 97.5 °F (36.4 °C)     Weight: 62.1 kg (137 lb) 61.7 kg (136 lb) 59.4 kg (131 lb)     BMI (Calculated): 24.5 24.3 23.4             Wt Readings from Last 15 Encounters:   07/17/24 1044 59.4 kg (131 lb)   04/17/24 0905 61.7 kg (136 lb)   01/02/24 1447 62.1 kg (137 lb)   10/25/23 1317 61.3 kg (135 lb 3.2 oz)   06/28/23 1120 59.9 kg (132 lb)   02/14/23 1435 61.2 kg (135 lb)   12/28/22 1316 61.2 kg (135 lb)   08/09/22 1255 59 kg (130 lb)   06/23/22 1310 61.2 kg (135 lb)   06/21/22 1400 62.3 kg (137 lb 6.4 oz)   06/23/21 1257 60.8 kg (134 lb)   06/23/20 1327 60.8 kg (134 lb)   05/10/18 1359 57.2 kg (126 lb)   05/09/17 1405 62.6 kg (138 lb)       BP ok  Other vitals ok  Weight down    Screening reviewed/updated   Think her surgery should be moved up; reaching out to [facility]/surgery to see if they will    Discussed upcoming [facility] of me/Dr Wright.   "Discussed new physician, Dr Bird Christianson coming as replacement. Decision to: stay at /[facility].    Follow up: Return for lab/provider 6m .  Future Appointments   Date Time Provider Department Center   1/17/2025  1:30 PM Myles Christianson MD [facility]     Data review above:   Rx: reviewed and decisions:   Rx new/changes: none  No orders of the defined types were placed in this encounter.    Orders placed:   LAB/Testing/Referrals: reviewed/orders:   Today:   No orders of the defined types were placed in this encounter.    Chronic/recurrent labs above or change to:   Same     Immunization History   Administered Date(s) Administered    ABRYSVO (RSV, 60+ or pregnant women 32-36 wks) 11/29/2023    COVID-19 (MODERNA) 1st,2nd,3rd Dose Monovalent 02/25/2021, 03/25/2021    COVID-19 (UNSPECIFIED) 10/18/2023    Fluzone High Dose =>65 Years ([employer] ONLY) 11/07/2019, 10/18/2023    Fluzone Quad >6mos (Multi-dose) 11/23/2016, 10/31/2017    Influenza Quad Vaccine (Inpatient) 10/31/2017    Influenza, Unspecified 10/05/2022    PEDS-Pneumococcal Conjugate (PCV7) 10/09/2009    Pneumococcal Conjugate 13-Valent (PCV13) 10/09/2009, 10/21/2015    Pneumococcal Conjugate 20-Valent (PCV20) 12/28/2022    Pneumococcal Conjugate Unspecified 10/09/2009    Pneumococcal, Unspecified 10/09/2009    Tdap 06/23/2021     We advised/reaffirmed our support/suggestion for staying complete with covid- covid boosters, seasonal flu/yearly and any missing vaccine from list we supplied; when cannot be given here we suggest contact with local health department office or pharmacy to review missing/needed vaccines and then bring nursing documentation for these vaccines to this office or call this information in. Shingles became \"free\" 1.1.23 for medicare insurance.    Health maintenance:   Body mass index is 23.39 kg/m².  BMI is within normal parameters. No other follow-up for BMI required.    Tobacco use reviewed:   Hillary Overton  reports that she quit smoking " about 34 years ago. Her smoking use included cigarettes. She started smoking about 68 years ago. She has a 17 pack-year smoking history. She has never used smokeless tobacco.       There are no Patient Instructions on file for this visit.

## 2024-07-18 ENCOUNTER — TELEPHONE (OUTPATIENT)
Dept: FAMILY MEDICINE CLINIC | Facility: CLINIC | Age: 89
End: 2024-07-18
Payer: MEDICARE

## 2024-07-18 NOTE — PROGRESS NOTES
I called dr ballard office she is in surgery but will call dr bocanegra as soon as she gets out and a note was given to dr bocanegra regarding this

## 2024-07-18 NOTE — TELEPHONE ENCOUNTER
Ro talked to Dr Mitchell; she just cannot change her schedule BUT if Ms Overton feels her pain is   A. Too melany  B. Getting worse  (Even over the weekend)    Go to  ER and she will like see another surgeon but would get her surgery same/next day    Wishing her well

## 2024-07-19 ENCOUNTER — HOSPITAL ENCOUNTER (OUTPATIENT)
Dept: PREADMISSION TESTING | Age: 89
Discharge: HOME | End: 2024-07-23

## 2024-07-19 VITALS — BODY MASS INDEX: 23.21 KG/M2 | WEIGHT: 131 LBS | HEIGHT: 63 IN

## 2024-07-19 NOTE — DISCHARGE INSTRUCTIONS
You will be contacted by someone from same-day surgery between 12-3 pm the day before your surgery regarding your arrival time. Please check your voicemail as they may leave a message with that information.  If you do not receive a call or voicemail or you have a problem please call [phone].    If you are running late the morning of your surgery or you wake up with signs/symptoms of COVID call [phone] for instructions    You will be scheduled to arrive 11/2-2 hours prior to your surgery time. Do not come earlier than when you are told to arrive.  You will come to the [facility] to register and be taken to the outpatient services area.    Do Not eat or drink anything after midnight. That includes candy, gum and mints, coffee, tea and water.    If appropriate, you may have a small sip of water with medications as instructed by your nurse/surgeon.  This is for your protection and to avoid food and liquid from getting into your lungs.     Always, follow your surgeon's instructions on any blood thinner or aspirin use before surgery.    Do not take any medications the morning of surgery.    Brush your teeth and shower the morning of surgery.    If you wear dentures, do not glue them in the morning of surgery. They will have to be removed for your procedure.  Do Not wear contacts. You may wear glasses. Be sure to bring a case for them as they will have to be removed.  Hearing aids will need to be removed before procedure. Be sure to bring a case for them.    Wear loose comfortable clothing.       No jewelry, make-up or fingernail polish. Please remove all jewelry and leave at home. This includes any body jewelry and wedding rings.  Any metal touching your body can cause a burn or may have to be cut off due to swelling or injury.    Be sure to confirm you medications and dosages are correct before coming in for surgery.    You will have anesthesia and may not drive home the day of surgery. You must

## 2024-07-23 LAB
EKG P AXIS: -2 DEGREES
EKG P-R INTERVAL: 140 MS
EKG Q-T INTERVAL: 426 MS
EKG QRS DURATION: 78 MS
EKG QTC CALCULATION (BAZETT): 441 MS
EKG T AXIS: 39 DEGREES

## 2024-07-25 ENCOUNTER — ANESTHESIA (OUTPATIENT)
Dept: OPERATING ROOM | Age: 89
End: 2024-07-25
Payer: MEDICARE

## 2024-07-25 ENCOUNTER — ANESTHESIA EVENT (OUTPATIENT)
Dept: OPERATING ROOM | Age: 89
End: 2024-07-25
Payer: MEDICARE

## 2024-07-25 ENCOUNTER — HOSPITAL ENCOUNTER (OUTPATIENT)
Age: 89
Setting detail: OUTPATIENT SURGERY
Discharge: HOME | End: 2024-07-25
Attending: SURGERY | Admitting: SURGERY
Payer: MEDICARE

## 2024-07-25 VITALS
SYSTOLIC BLOOD PRESSURE: 160 MMHG | RESPIRATION RATE: 16 BRPM | WEIGHT: 131 LBS | OXYGEN SATURATION: 94 % | DIASTOLIC BLOOD PRESSURE: 67 MMHG | BODY MASS INDEX: 23.21 KG/M2 | HEIGHT: 63 IN | TEMPERATURE: 97.5 F | HEART RATE: 63 BPM

## 2024-07-25 DIAGNOSIS — K80.10 CHOLELITHIASIS WITH CHRONIC CHOLECYSTITIS: ICD-10-CM

## 2024-07-25 DIAGNOSIS — K80.10 CALCULUS OF GALLBLADDER WITH CHRONIC CHOLECYSTITIS WITHOUT OBSTRUCTION: Primary | ICD-10-CM

## 2024-07-25 PROCEDURE — 2500000003 HC RX 250 WO HCPCS: Performed by: SURGERY

## 2024-07-25 PROCEDURE — 7100000010 HC PHASE II RECOVERY - FIRST 15 MIN: Performed by: SURGERY

## 2024-07-25 PROCEDURE — 7100000000 HC PACU RECOVERY - FIRST 15 MIN: Performed by: SURGERY

## 2024-07-25 PROCEDURE — 6360000002 HC RX W HCPCS: Performed by: NURSE ANESTHETIST, CERTIFIED REGISTERED

## 2024-07-25 PROCEDURE — 6360000002 HC RX W HCPCS: Performed by: SURGERY

## 2024-07-25 PROCEDURE — 7100000011 HC PHASE II RECOVERY - ADDTL 15 MIN: Performed by: SURGERY

## 2024-07-25 PROCEDURE — 2720000010 HC SURG SUPPLY STERILE: Performed by: SURGERY

## 2024-07-25 PROCEDURE — 2500000003 HC RX 250 WO HCPCS: Performed by: NURSE ANESTHETIST, CERTIFIED REGISTERED

## 2024-07-25 PROCEDURE — 3700000000 HC ANESTHESIA ATTENDED CARE: Performed by: SURGERY

## 2024-07-25 PROCEDURE — 3700000001 HC ADD 15 MINUTES (ANESTHESIA): Performed by: SURGERY

## 2024-07-25 PROCEDURE — C9290 INJ, BUPIVACAINE LIPOSOME: HCPCS | Performed by: SURGERY

## 2024-07-25 PROCEDURE — 2580000003 HC RX 258: Performed by: SURGERY

## 2024-07-25 PROCEDURE — 6370000000 HC RX 637 (ALT 250 FOR IP): Performed by: SURGERY

## 2024-07-25 PROCEDURE — C1889 IMPLANT/INSERT DEVICE, NOC: HCPCS | Performed by: SURGERY

## 2024-07-25 PROCEDURE — 88304 TISSUE EXAM BY PATHOLOGIST: CPT

## 2024-07-25 PROCEDURE — 7100000001 HC PACU RECOVERY - ADDTL 15 MIN: Performed by: SURGERY

## 2024-07-25 PROCEDURE — 2709999900 HC NON-CHARGEABLE SUPPLY: Performed by: SURGERY

## 2024-07-25 PROCEDURE — 3600000019 HC SURGERY ROBOT ADDTL 15MIN: Performed by: SURGERY

## 2024-07-25 PROCEDURE — S2900 ROBOTIC SURGICAL SYSTEM: HCPCS | Performed by: SURGERY

## 2024-07-25 PROCEDURE — 47562 LAPAROSCOPIC CHOLECYSTECTOMY: CPT | Performed by: SURGERY

## 2024-07-25 PROCEDURE — 3600000009 HC SURGERY ROBOT BASE: Performed by: SURGERY

## 2024-07-25 DEVICE — CLIP INT L POLYMER LOK LIG HEM O LOK (6EA/PK): Type: IMPLANTABLE DEVICE | Status: FUNCTIONAL

## 2024-07-25 RX ORDER — FENTANYL CITRATE 50 UG/ML
INJECTION, SOLUTION INTRAMUSCULAR; INTRAVENOUS PRN
Status: DISCONTINUED | OUTPATIENT
Start: 2024-07-25 | End: 2024-07-25 | Stop reason: SDUPTHER

## 2024-07-25 RX ORDER — MORPHINE SULFATE 2 MG/ML
1 INJECTION, SOLUTION INTRAMUSCULAR; INTRAVENOUS EVERY 5 MIN PRN
Status: COMPLETED | OUTPATIENT
Start: 2024-07-25 | End: 2024-07-25

## 2024-07-25 RX ORDER — ONDANSETRON 2 MG/ML
4 INJECTION INTRAMUSCULAR; INTRAVENOUS
Status: DISCONTINUED | OUTPATIENT
Start: 2024-07-25 | End: 2024-07-25 | Stop reason: HOSPADM

## 2024-07-25 RX ORDER — BUPIVACAINE HYDROCHLORIDE 2.5 MG/ML
INJECTION, SOLUTION INFILTRATION; PERINEURAL PRN
Status: DISCONTINUED | OUTPATIENT
Start: 2024-07-25 | End: 2024-07-25 | Stop reason: ALTCHOICE

## 2024-07-25 RX ORDER — LIDOCAINE HYDROCHLORIDE 10 MG/ML
INJECTION, SOLUTION EPIDURAL; INFILTRATION; INTRACAUDAL; PERINEURAL PRN
Status: DISCONTINUED | OUTPATIENT
Start: 2024-07-25 | End: 2024-07-25 | Stop reason: SDUPTHER

## 2024-07-25 RX ORDER — SODIUM CHLORIDE 0.9 % (FLUSH) 0.9 %
5-40 SYRINGE (ML) INJECTION EVERY 12 HOURS SCHEDULED
Status: DISCONTINUED | OUTPATIENT
Start: 2024-07-25 | End: 2024-07-25 | Stop reason: HOSPADM

## 2024-07-25 RX ORDER — PROPOFOL 10 MG/ML
INJECTION, EMULSION INTRAVENOUS PRN
Status: DISCONTINUED | OUTPATIENT
Start: 2024-07-25 | End: 2024-07-25 | Stop reason: SDUPTHER

## 2024-07-25 RX ORDER — DEXAMETHASONE SODIUM PHOSPHATE 10 MG/ML
8 INJECTION, SOLUTION INTRAMUSCULAR; INTRAVENOUS ONCE
Status: DISCONTINUED | OUTPATIENT
Start: 2024-07-25 | End: 2024-07-25 | Stop reason: HOSPADM

## 2024-07-25 RX ORDER — NALOXONE HYDROCHLORIDE 0.4 MG/ML
INJECTION, SOLUTION INTRAMUSCULAR; INTRAVENOUS; SUBCUTANEOUS PRN
Status: DISCONTINUED | OUTPATIENT
Start: 2024-07-25 | End: 2024-07-25 | Stop reason: HOSPADM

## 2024-07-25 RX ORDER — CELECOXIB 100 MG/1
100 CAPSULE ORAL ONCE
Status: COMPLETED | OUTPATIENT
Start: 2024-07-25 | End: 2024-07-25

## 2024-07-25 RX ORDER — ONDANSETRON 2 MG/ML
INJECTION INTRAMUSCULAR; INTRAVENOUS PRN
Status: DISCONTINUED | OUTPATIENT
Start: 2024-07-25 | End: 2024-07-25 | Stop reason: SDUPTHER

## 2024-07-25 RX ORDER — SODIUM CHLORIDE, SODIUM LACTATE, POTASSIUM CHLORIDE, CALCIUM CHLORIDE 600; 310; 30; 20 MG/100ML; MG/100ML; MG/100ML; MG/100ML
INJECTION, SOLUTION INTRAVENOUS CONTINUOUS
Status: DISCONTINUED | OUTPATIENT
Start: 2024-07-25 | End: 2024-07-25 | Stop reason: HOSPADM

## 2024-07-25 RX ORDER — MORPHINE SULFATE 2 MG/ML
2 INJECTION, SOLUTION INTRAMUSCULAR; INTRAVENOUS EVERY 5 MIN PRN
Status: COMPLETED | OUTPATIENT
Start: 2024-07-25 | End: 2024-07-25

## 2024-07-25 RX ORDER — OXYCODONE HYDROCHLORIDE AND ACETAMINOPHEN 5; 325 MG/1; MG/1
1 TABLET ORAL ONCE
Status: COMPLETED | OUTPATIENT
Start: 2024-07-25 | End: 2024-07-25

## 2024-07-25 RX ORDER — SODIUM CHLORIDE 9 MG/ML
INJECTION, SOLUTION INTRAVENOUS PRN
Status: DISCONTINUED | OUTPATIENT
Start: 2024-07-25 | End: 2024-07-25 | Stop reason: HOSPADM

## 2024-07-25 RX ORDER — DEXAMETHASONE SODIUM PHOSPHATE 10 MG/ML
INJECTION, SOLUTION INTRAMUSCULAR; INTRAVENOUS PRN
Status: DISCONTINUED | OUTPATIENT
Start: 2024-07-25 | End: 2024-07-25 | Stop reason: SDUPTHER

## 2024-07-25 RX ORDER — SODIUM CHLORIDE 0.9 % (FLUSH) 0.9 %
5-40 SYRINGE (ML) INJECTION PRN
Status: DISCONTINUED | OUTPATIENT
Start: 2024-07-25 | End: 2024-07-25 | Stop reason: HOSPADM

## 2024-07-25 RX ORDER — INDOCYANINE GREEN AND WATER 25 MG
KIT INJECTION PRN
Status: DISCONTINUED | OUTPATIENT
Start: 2024-07-25 | End: 2024-07-25 | Stop reason: ALTCHOICE

## 2024-07-25 RX ORDER — OXYCODONE HYDROCHLORIDE AND ACETAMINOPHEN 5; 325 MG/1; MG/1
1 TABLET ORAL EVERY 6 HOURS PRN
Qty: 12 TABLET | Refills: 0 | Status: SHIPPED | OUTPATIENT
Start: 2024-07-25 | End: 2024-07-28

## 2024-07-25 RX ORDER — ACETAMINOPHEN 500 MG
1000 TABLET ORAL ONCE
Status: COMPLETED | OUTPATIENT
Start: 2024-07-25 | End: 2024-07-25

## 2024-07-25 RX ORDER — ROCURONIUM BROMIDE 10 MG/ML
INJECTION, SOLUTION INTRAVENOUS PRN
Status: DISCONTINUED | OUTPATIENT
Start: 2024-07-25 | End: 2024-07-25 | Stop reason: SDUPTHER

## 2024-07-25 RX ADMIN — MORPHINE SULFATE 1 MG: 2 INJECTION, SOLUTION INTRAMUSCULAR; INTRAVENOUS at 11:46

## 2024-07-25 RX ADMIN — DEXAMETHASONE SODIUM PHOSPHATE 10 MG: 10 INJECTION, SOLUTION INTRAMUSCULAR; INTRAVENOUS at 09:42

## 2024-07-25 RX ADMIN — MORPHINE SULFATE 2 MG: 2 INJECTION, SOLUTION INTRAMUSCULAR; INTRAVENOUS at 11:29

## 2024-07-25 RX ADMIN — LIDOCAINE HYDROCHLORIDE 50 MG: 10 INJECTION, SOLUTION EPIDURAL; INFILTRATION; INTRACAUDAL; PERINEURAL at 09:31

## 2024-07-25 RX ADMIN — ROCURONIUM BROMIDE 10 MG: 10 INJECTION, SOLUTION INTRAVENOUS at 10:20

## 2024-07-25 RX ADMIN — FENTANYL CITRATE 50 MCG: 0.05 INJECTION, SOLUTION INTRAMUSCULAR; INTRAVENOUS at 09:57

## 2024-07-25 RX ADMIN — ONDANSETRON 4 MG: 2 INJECTION INTRAMUSCULAR; INTRAVENOUS at 10:44

## 2024-07-25 RX ADMIN — PROPOFOL 40 MG: 10 INJECTION, EMULSION INTRAVENOUS at 09:31

## 2024-07-25 RX ADMIN — FENTANYL CITRATE 50 MCG: 0.05 INJECTION, SOLUTION INTRAMUSCULAR; INTRAVENOUS at 10:50

## 2024-07-25 RX ADMIN — FENTANYL CITRATE 25 MCG: 0.05 INJECTION, SOLUTION INTRAMUSCULAR; INTRAVENOUS at 09:30

## 2024-07-25 RX ADMIN — FENTANYL CITRATE 50 MCG: 0.05 INJECTION, SOLUTION INTRAMUSCULAR; INTRAVENOUS at 09:42

## 2024-07-25 RX ADMIN — SUGAMMADEX 200 MG: 100 INJECTION, SOLUTION INTRAVENOUS at 10:46

## 2024-07-25 RX ADMIN — MORPHINE SULFATE 2 MG: 2 INJECTION, SOLUTION INTRAMUSCULAR; INTRAVENOUS at 11:35

## 2024-07-25 RX ADMIN — ROCURONIUM BROMIDE 25 MG: 10 INJECTION, SOLUTION INTRAVENOUS at 09:31

## 2024-07-25 RX ADMIN — SODIUM CHLORIDE, POTASSIUM CHLORIDE, SODIUM LACTATE AND CALCIUM CHLORIDE: 600; 310; 30; 20 INJECTION, SOLUTION INTRAVENOUS at 07:59

## 2024-07-25 RX ADMIN — OXYCODONE HYDROCHLORIDE AND ACETAMINOPHEN 1 TABLET: 5; 325 TABLET ORAL at 12:38

## 2024-07-25 RX ADMIN — ACETAMINOPHEN 1000 MG: 500 TABLET ORAL at 08:02

## 2024-07-25 RX ADMIN — CEFAZOLIN 2000 MG: 2 INJECTION, POWDER, FOR SOLUTION INTRAMUSCULAR; INTRAVENOUS at 09:39

## 2024-07-25 RX ADMIN — MORPHINE SULFATE 1 MG: 2 INJECTION, SOLUTION INTRAMUSCULAR; INTRAVENOUS at 12:06

## 2024-07-25 RX ADMIN — FENTANYL CITRATE 25 MCG: 0.05 INJECTION, SOLUTION INTRAMUSCULAR; INTRAVENOUS at 09:26

## 2024-07-25 RX ADMIN — ROCURONIUM BROMIDE 10 MG: 10 INJECTION, SOLUTION INTRAVENOUS at 09:45

## 2024-07-25 RX ADMIN — CELECOXIB 100 MG: 100 CAPSULE ORAL at 08:03

## 2024-07-25 NOTE — DISCHARGE INSTR - ACTIVITY
No heavy lifting.  May shower tomorrow.   Do not soak in tub.   Do not drive while on pain medications.      Avoid constipation.   Take colace nightly (up to 300 mg) and miralax daily (up to three doses).   Drink 64 ounces of water and take a powdered fiber supplement daily (ie-Metamucil).      If you have signs of infection, call you doctor. These include:   -Increased pain, swelling, warmth, or redness  -Red streaks leading from the incision  -Pus draining from the incision  -A fever > 100.4

## 2024-07-25 NOTE — INTERVAL H&P NOTE
Update History & Physical    The patient's History and Physical of July 16, 2024 was reviewed with the patient and I examined the patient. There was no change. The surgical site was confirmed by the patient and me.     Plan: The risks, benefits, expected outcome, and alternative to the recommended procedure have been discussed with the patient. Patient understands and wants to proceed with the procedure.     Electronically signed by Alice Mora DO on 7/25/2024 at 9:05 AM

## 2024-07-25 NOTE — OP NOTE
Operative Note      Patient: Alanis Dan  YOB: 1935  MRN: [id number]    Date of Procedure: 7/25/2024    Pre-Op Diagnosis Codes:     * Cholelithiasis with chronic cholecystitis [K80.10]    Post-Op Diagnosis: Same       Procedure(s):  ROBOTIC LAPAROSCOPIC CHOLECYSTECTOMY    Surgeon(s):  Alice Mora DO    Assistant:   First Assistant: Selene Pittman RN    Anesthesia: General    Estimated Blood Loss (mL): Minimal    Complications: None    Specimens:   ID Type Source Tests Collected by Time Destination   A : GALLBLADDER AND CONTENTS Tissue Gallbladder SURGICAL PATHOLOGY Alice Mora DO 7/25/2024 1039        Implants:  Implant Name Type Inv. Item Serial No.  Lot No. LRB No. Used Action   CLIP INT L POLYMER ALEX LIG HEM O ALEX (6EA/PK) - [id number]  CLIP INT L POLYMER ALEX LIG HEM O ALEX (6EA/PK)  [employer]  N/A 4 Implanted         Drains: * No LDAs found *    Findings:  Infection Present At Time Of Surgery (PATOS) (choose all levels that have infection present):  No infection present  Other Findings: acute on chronic cholecystitis with cholelithiasis.     Detailed Description of Procedure:   Ms. Dan was taken to the main operating room and placed on the operating table supine.  After the induction of adequate general endotracheal anesthesia the abdomen was prepped in the usual sterile fashion.  Time out performed.     Local anesthestic was administered and a small incision was made in the left upper abdomen and through this a zero-degree laparoscope was placed into the abdomen under direct visualization. There was no injury from insertion. The abdomen was insufflated. Robotic incisions were placed and the trocars placed under direct visualization. The patient was placed in reverse trendelenberg position and rotated to the left.  The [employer] Xi operating system was brought into the field and docked. Working instruments were advanced and the fundus of the gallbladder was

## 2024-07-29 ENCOUNTER — TELEPHONE (OUTPATIENT)
Dept: SURGERY | Age: 89
End: 2024-07-29

## 2024-07-29 NOTE — TELEPHONE ENCOUNTER
7/29/2024 Patient called and stated had surgery on 7/25/2024 (Robotic Laparoscopic Cholecystectomy) and her pain is worse on her right side then what it was.    Callback# [phone]  lisandra

## 2024-07-29 NOTE — TELEPHONE ENCOUNTER
I s/w pt, she said yesterday the incision around her belly button area began being more sore & painful pretty much constantly. Dr Mora did robotic lap demetrio on 7/25/24. She said its not red, not warm, no drainage, no fever. She said she has 3 oxycodone pills left that Dr Mora prescribed her and wants to see if Dr Mora will send some more to [facility]. I told her I'd send this info to her, that she's off today but will be here tomorrow & see it. She said ok & thanks.

## 2024-08-07 ENCOUNTER — OFFICE VISIT (OUTPATIENT)
Dept: SURGERY | Age: 89
End: 2024-08-07

## 2024-08-07 VITALS — HEIGHT: 63 IN | OXYGEN SATURATION: 98 % | WEIGHT: 130 LBS | BODY MASS INDEX: 23.04 KG/M2 | HEART RATE: 82 BPM

## 2024-08-07 DIAGNOSIS — Z09 POSTOPERATIVE EXAMINATION: Primary | ICD-10-CM

## 2024-08-07 PROCEDURE — 99024 POSTOP FOLLOW-UP VISIT: CPT | Performed by: SURGERY

## 2024-08-07 NOTE — PROGRESS NOTES
Postop Progress Note    Subjective    Alanis Dan presents to the office for postop follow up 2 weeks s/p robotic cholecystectomy. She is doing well. Tolerating diet.     Objective    Vitals:    08/07/24 1132   Pulse: 82   SpO2: 98%     General: alert, cooperative and no distress  Incision: healing well    Assessment  Doing well postoperatively.    Plan  1. Continue any current medications  2. Wound care discussed  3. Pt is to increase activities as tolerated  4. Usual diet  5. Follow up: as needed    Electronically signed by Alice Mora DO on 8/7/2024 at 11:55 AM

## 2024-08-13 DIAGNOSIS — Z12.31 ENCOUNTER FOR SCREENING MAMMOGRAM FOR BREAST CANCER: ICD-10-CM

## 2024-08-15 DIAGNOSIS — Z78.0 MENOPAUSE: ICD-10-CM

## 2024-08-15 DIAGNOSIS — Z12.31 ENCOUNTER FOR SCREENING MAMMOGRAM FOR BREAST CANCER: ICD-10-CM

## 2024-09-11 DIAGNOSIS — I10 PRIMARY HYPERTENSION: ICD-10-CM

## 2024-09-11 RX ORDER — LISINOPRIL 10 MG/1
10 TABLET ORAL DAILY
Qty: 90 TABLET | Refills: 1 | Status: SHIPPED | OUTPATIENT
Start: 2024-09-11

## 2024-09-11 NOTE — TELEPHONE ENCOUNTER
Rx Refill Note  Requested Prescriptions     Pending Prescriptions Disp Refills    lisinopril (PRINIVIL,ZESTRIL) 10 MG tablet [Pharmacy Med Name: LISINOPRIL 10MG TABLETS] 90 tablet 1     Sig: TAKE 1 TABLET BY MOUTH DAILY      Last office visit with office: 07/17/24  Next office visit with office: 01/17/25    UDS:     DATE OF LAST REFILL: 03/18/24    Controlled Substance Agreement:     BRIGITTE OR NOMI:          {TIP  Is Refill Pharmacy correct?:  Yennifer Stout MA  09/11/24, 13:24 CDT

## 2024-09-23 ENCOUNTER — OFFICE VISIT (OUTPATIENT)
Dept: FAMILY MEDICINE CLINIC | Facility: CLINIC | Age: 89
End: 2024-09-23
Payer: MEDICARE

## 2024-09-23 VITALS
OXYGEN SATURATION: 96 % | HEIGHT: 63 IN | DIASTOLIC BLOOD PRESSURE: 72 MMHG | BODY MASS INDEX: 22.96 KG/M2 | HEART RATE: 84 BPM | SYSTOLIC BLOOD PRESSURE: 128 MMHG | WEIGHT: 129.6 LBS

## 2024-09-23 DIAGNOSIS — L30.9 ECZEMA, UNSPECIFIED TYPE: Primary | ICD-10-CM

## 2024-09-23 PROCEDURE — 1126F AMNT PAIN NOTED NONE PRSNT: CPT

## 2024-09-23 PROCEDURE — 99213 OFFICE O/P EST LOW 20 MIN: CPT

## 2024-09-23 RX ORDER — DIAPER,BRIEF,INFANT-TODD,DISP
1 EACH MISCELLANEOUS 2 TIMES DAILY
Qty: 56 G | Refills: 1 | Status: SHIPPED | OUTPATIENT
Start: 2024-09-23

## 2024-09-23 RX ORDER — METHYLPREDNISOLONE 4 MG
TABLET, DOSE PACK ORAL
Qty: 1 EACH | Refills: 0 | Status: SHIPPED | OUTPATIENT
Start: 2024-09-23

## 2024-10-01 ENCOUNTER — TELEPHONE (OUTPATIENT)
Dept: FAMILY MEDICINE CLINIC | Facility: CLINIC | Age: 89
End: 2024-10-01
Payer: MEDICARE

## 2024-10-01 NOTE — TELEPHONE ENCOUNTER
She can get 1% hydrocortisone cream over the counter for 1.25 at [facility]. Or I can prescribe preferred. Might be cheaper the other way though. Let me know what she wants to do.

## 2024-10-01 NOTE — TELEPHONE ENCOUNTER
Hydrocortisone 1% ointment 38.35 gm is not covered by pt plan.  The preferred alternative is Triamcinolonoin.      Please send new script to [facility]

## 2024-10-02 NOTE — TELEPHONE ENCOUNTER
I left the message on her cell that she could get the cream at [facility] but if she did not want to go that route to let us know and we would send in the preferred ins recommendations but unsure how much that would be.

## 2024-10-15 ENCOUNTER — OFFICE VISIT (OUTPATIENT)
Dept: FAMILY MEDICINE CLINIC | Facility: CLINIC | Age: 89
End: 2024-10-15
Payer: MEDICARE

## 2024-10-15 VITALS
OXYGEN SATURATION: 97 % | DIASTOLIC BLOOD PRESSURE: 78 MMHG | SYSTOLIC BLOOD PRESSURE: 140 MMHG | BODY MASS INDEX: 24.18 KG/M2 | HEART RATE: 78 BPM | WEIGHT: 131.4 LBS | HEIGHT: 62 IN

## 2024-10-15 DIAGNOSIS — L30.9 ECZEMA, UNSPECIFIED TYPE: Primary | ICD-10-CM

## 2024-10-15 PROCEDURE — 99213 OFFICE O/P EST LOW 20 MIN: CPT

## 2024-10-15 PROCEDURE — 1126F AMNT PAIN NOTED NONE PRSNT: CPT

## 2024-10-15 NOTE — PROGRESS NOTES
"Chief Complaint  Eczema and Hypertension    Subjective      Hillary Overton presents to [facility]  History of Present Illness  The patient is an 88-year-old female who presents today for a follow-up visit, approximately 3 weeks after her last appointment on 09/23/2024.    She was previously diagnosed with eczema and was prescribed an oral steroid due to the widespread nature of her rash. A topical corticosteroid cream was also provided for any breakthrough symptoms. She reports that her rash has significantly improved.    However, she is experiencing dryness in her fingers, which she describes as feeling like sandpaper. She has been using Aquaphor for this issue. She continues to use hydrocortisone 1 percent ointment and has completed a 6-day course of oral steroids.      Objective   Vital Signs:  /78   Pulse 78   Ht 157.5 cm (62\")   Wt 59.6 kg (131 lb 6.4 oz)   SpO2 97%   BMI 24.03 kg/m²   Estimated body mass index is 24.03 kg/m² as calculated from the following:    Height as of this encounter: 157.5 cm (62\").    Weight as of this encounter: 59.6 kg (131 lb 6.4 oz).    BMI is within normal parameters. No other follow-up for BMI required.      Physical Exam     Physical Exam        Result Review :  The following labs/imaging/notes were reviewed and discussed with the patient by Myles Christianson MD on 10/15/2024:            Assessment      Diagnoses and all orders for this visit:    1. Eczema, unspecified type (Primary)             Assessment & Plan  1. Eczema.  The patient's condition appears to be improving. The current plan is to manage the condition with the prescribed Hydrocortisone 1% ointment. She has been advised to report any flare-ups. The potential for hand eczema was discussed, and she was advised to use Aquaphor for dryness.    Follow-up  Patient is scheduled for a follow-up visit on 01/16/2025.    No orders of the defined types were placed in this " encounter.      Follow Up   No follow-ups on file.  Patient was given instructions and counseling regarding her condition or for health maintenance advice. Please see specific information pulled into the AVS if appropriate.         Patient or patient representative verbalized consent for the use of Ambient Listening during the visit with  Myles Christianson MD for chart documentation. 10/15/2024  14:05 CDT

## 2024-11-13 DIAGNOSIS — M81.0 OSTEOPOROSIS, UNSPECIFIED OSTEOPOROSIS TYPE, UNSPECIFIED PATHOLOGICAL FRACTURE PRESENCE: ICD-10-CM

## 2024-11-13 RX ORDER — DENOSUMAB 60 MG/ML
60 INJECTION SUBCUTANEOUS
Qty: 1 ML | Refills: 1 | Status: SHIPPED | OUTPATIENT
Start: 2024-11-13

## 2024-11-14 ENCOUNTER — TELEPHONE (OUTPATIENT)
Dept: FAMILY MEDICINE CLINIC | Facility: CLINIC | Age: 89
End: 2024-11-14
Payer: MEDICARE

## 2024-11-14 DIAGNOSIS — L30.9 ECZEMA, UNSPECIFIED TYPE: Primary | ICD-10-CM

## 2024-11-14 NOTE — TELEPHONE ENCOUNTER
Pt stated that the hydrocortisone and aquaphor is not helping she said that her fingers are getting worse,do you want to see or refer to derm?

## 2024-11-14 NOTE — TELEPHONE ENCOUNTER
Pt called.[facility] told her that they did not have a script for her prolia. She is not due yet. Didn't know why they were calling. Also the rx that she was given for eczema did not work. It has gotten worse. Tips of her fingers hurt. Split open.  Please advise. kwl

## 2024-12-02 ENCOUNTER — TELEPHONE (OUTPATIENT)
Dept: FAMILY MEDICINE CLINIC | Facility: CLINIC | Age: 89
End: 2024-12-02

## 2024-12-02 NOTE — TELEPHONE ENCOUNTER
Caller: Hillary Overton    Relationship: Self    Best call back number: [phone]     What is the best time to reach you: ANYTIME    Who are you requesting to speak with (clinical staff, provider,  specific staff member): CLINICAL    Do you know the name of the person who called: HILLARY    What was the call regarding: HILLARY HAS EXEMA AND SAID DR HANNA  WAS SUPPOSED TO BE SUBMITTING A REFERRAL FOR A DERMATOLOGIST BUT SHE HASN'T HEARD ANYTHING YET (IT'S BEEN 3 WEEKS)    SHE SAID THAT SOMEONE HAS SUGGESTED DUPIXENT FOR HER EXEMA AND WANTS TO YOUR THOUGHTS ON IT. THE BEDS OF HER NAILS ARE TURNING WHITE AND ARE ABOUT TO FALL OFF.    Is it okay if the provider responds through [employer]: NO    PLEASE CALL BACK

## 2024-12-10 DIAGNOSIS — I10 PRIMARY HYPERTENSION: ICD-10-CM

## 2024-12-10 RX ORDER — LISINOPRIL 10 MG/1
10 TABLET ORAL DAILY
Qty: 90 TABLET | Refills: 1 | Status: SHIPPED | OUTPATIENT
Start: 2024-12-10

## 2024-12-31 DIAGNOSIS — M81.0 OSTEOPOROSIS, UNSPECIFIED OSTEOPOROSIS TYPE, UNSPECIFIED PATHOLOGICAL FRACTURE PRESENCE: ICD-10-CM

## 2024-12-31 RX ORDER — DENOSUMAB 60 MG/ML
60 INJECTION SUBCUTANEOUS
Qty: 1 ML | Refills: 1 | Status: SHIPPED | OUTPATIENT
Start: 2024-12-31

## 2024-12-31 NOTE — TELEPHONE ENCOUNTER
Caller: Hillary Overton BOGDAN    Relationship: Self    Best call back number:      [phone]       Requested Prescriptions:   Requested Prescriptions     Pending Prescriptions Disp Refills    denosumab (Prolia) 60 MG/ML solution prefilled syringe syringe 1 mL 1     Sig: Inject 1 mL under the skin into the appropriate area as directed Every 6 (Six) Months.        Pharmacy where request should be sent: [facility] - [address] [phone] [facility] [phone]      Last office visit with prescribing clinician: 10/15/2024   Last telemedicine visit with prescribing clinician: Visit date not found   Next office visit with prescribing clinician: 1/17/2025     Additional details provided by patient:  WANTS TO MAKE SURE WE HAVE THIS BY HER APPT COMING UP JANUARY 17TH, 2025.  YES [facility] WILL DELIVER TO OFFICE SO THEN WE CAN INJECT.      Does the patient have less than a 3 day supply:  [] Yes  [x] No    Would you like a call back once the refill request has been completed: [] Yes [x] No    If the office needs to give you a call back, can they leave a voicemail: [] Yes [x] No    Won Martino Rep   12/31/24 10:23 CST

## 2025-01-03 ENCOUNTER — CLINICAL SUPPORT (OUTPATIENT)
Dept: FAMILY MEDICINE CLINIC | Facility: CLINIC | Age: 89
End: 2025-01-03
Payer: MEDICARE

## 2025-01-03 DIAGNOSIS — M81.0 OSTEOPOROSIS, UNSPECIFIED OSTEOPOROSIS TYPE, UNSPECIFIED PATHOLOGICAL FRACTURE PRESENCE: Primary | ICD-10-CM

## 2025-01-03 PROCEDURE — 96372 THER/PROPH/DIAG INJ SC/IM: CPT

## 2025-01-17 ENCOUNTER — OFFICE VISIT (OUTPATIENT)
Dept: FAMILY MEDICINE CLINIC | Facility: CLINIC | Age: 89
End: 2025-01-17
Payer: MEDICARE

## 2025-01-17 VITALS
OXYGEN SATURATION: 97 % | HEART RATE: 73 BPM | WEIGHT: 134 LBS | DIASTOLIC BLOOD PRESSURE: 74 MMHG | BODY MASS INDEX: 24.66 KG/M2 | HEIGHT: 62 IN | SYSTOLIC BLOOD PRESSURE: 136 MMHG

## 2025-01-17 DIAGNOSIS — L30.8 OTHER ECZEMA: ICD-10-CM

## 2025-01-17 DIAGNOSIS — B35.1 NAIL FUNGUS: Primary | ICD-10-CM

## 2025-01-17 PROBLEM — Z00.00 WELLNESS EXAMINATION: Status: RESOLVED | Noted: 2017-05-08 | Resolved: 2025-01-17

## 2025-01-17 PROBLEM — Z01.89 LABORATORY TEST: Status: RESOLVED | Noted: 2018-05-10 | Resolved: 2025-01-17

## 2025-01-17 RX ORDER — METHYLPREDNISOLONE 4 MG/1
TABLET ORAL
Qty: 1 EACH | Refills: 0 | Status: SHIPPED | OUTPATIENT
Start: 2025-01-17

## 2025-01-17 RX ORDER — TERBINAFINE HYDROCHLORIDE 250 MG/1
250 TABLET ORAL DAILY
Qty: 42 TABLET | Refills: 0 | Status: SHIPPED | OUTPATIENT
Start: 2025-01-17 | End: 2025-02-28

## 2025-01-17 RX ORDER — TERBINAFINE HYDROCHLORIDE 250 MG/1
250 TABLET ORAL DAILY
Qty: 42 TABLET | Refills: 0 | Status: SHIPPED | OUTPATIENT
Start: 2025-01-17 | End: 2025-01-17 | Stop reason: SDUPTHER

## 2025-01-17 RX ORDER — BETAMETHASONE DIPROPIONATE 0.5 MG/G
CREAM TOPICAL
COMMUNITY
Start: 2024-12-10

## 2025-01-17 RX ORDER — TACROLIMUS 1 MG/G
OINTMENT TOPICAL
COMMUNITY
Start: 2024-12-10

## 2025-01-17 RX ORDER — CICLOPIROX 80 MG/ML
SOLUTION TOPICAL
COMMUNITY
Start: 2024-12-10

## 2025-01-17 NOTE — PROGRESS NOTES
"Chief Complaint  Eczema (Pt has had a rash all over her body for a while and was given a medication did not seem to help the dermatologist gave her.) and Nail Problem (Pt has a fungus under her fingernails.  )    Subjective      Hillary Overton presents to [facility]  History of Present Illness  The patient is an 89-year-old female who is here today due to concern for a rash all over her body.    She has been experiencing a generalized rash for several months, which was initially evaluated by a dermatologist approximately 3 months ago. The rash is associated with itching, particularly on her back and arms. She was prescribed betamethasone cream, but it did not provide significant relief. She was informed that the rash was indicative of a fungal infection and was given a nail polish as part of the treatment regimen. She has been applying the nail polish to her nails and surrounding areas. The rash has been present for at least 1.5 months and has progressively worsened. She has a scheduled follow-up appointment with dermatology on 02/06/2024 or 02/07/2024. She was not prescribed any oral steroids during her initial visit to the dermatologist.    MEDICATIONS  Current: Betamethasone cream.      Objective   Vital Signs:  /74   Pulse 73   Ht 157.5 cm (62\")   Wt 60.8 kg (134 lb)   SpO2 97%   BMI 24.51 kg/m²   Estimated body mass index is 24.51 kg/m² as calculated from the following:    Height as of this encounter: 157.5 cm (62\").    Weight as of this encounter: 60.8 kg (134 lb).    BMI is within normal parameters. No other follow-up for BMI required.      Physical Exam     Physical Exam        Result Review :  The following labs/imaging/notes were reviewed and discussed with the patient by Myles Christianson MD on 01/17/2025:            Assessment      Diagnoses and all orders for this visit:    1. Nail fungus (Primary)  -     CBC & Differential  -     Comprehensive Metabolic " Panel    2. Other eczema    Other orders  -     methylPREDNISolone (MEDROL) 4 MG dose pack; Take as directed on package instructions.  Dispense: 1 each; Refill: 0  -     terbinafine (lamiSIL) 250 MG tablet; Take 1 tablet by mouth Daily for 42 days.  Dispense: 42 tablet; Refill: 0             Assessment & Plan  1. Eczema.  The patient presents with a widespread rash that has been ongoing for several months. She reports itching, especially on her back and arms. Previous treatment with betamethasone cream was ineffective. A Medrol Dosepak will be prescribed to see if the rash responds to it. She is advised to inform her dermatologist that the current topical treatments are not working and to discuss the possibility of starting a biologic treatment due to the extensive area affected. She is scheduled to follow up with dermatology in February.    2. Onychomycosis.  The patient has thickening and discoloration of her nails, which has been present for at least a couple of months and appears to be worsening. An oral antifungal medication will be prescribed, to be taken once daily for a duration of 6 weeks. Laboratory tests will be conducted today to monitor her response to the treatment.    Follow-up  The patient will follow up in 2 months for a Medicare wellness visit and to evaluate the progress of her onychomycosis and eczema following her dermatology consultation.    Orders Placed This Encounter   Procedures    Comprehensive Metabolic Panel     Order Specific Question:   Release to patient     Answer:   Routine Release [[id number]]    CBC & Differential     Order Specific Question:   Manual Differential     Answer:   No     Order Specific Question:   Release to patient     Answer:   Routine Release [[id number]]       Follow Up   Return in about 2 months (around 3/17/2025) for Medicare Wellness, oncomycosis post tx, eczema post derm f/u.  Patient was given instructions and counseling regarding her condition or for health  maintenance advice. Please see specific information pulled into the AVS if appropriate.         Patient or patient representative verbalized consent for the use of Ambient Listening during the visit with  Myles Christianson MD for chart documentation. 1/17/2025  14:30 CST

## 2025-01-17 NOTE — TELEPHONE ENCOUNTER
Hub staff attempted to follow warm transfer process and was unsuccessful     Caller: Hillary Overton    Relationship to patient: Self    Best call back number: [phone]     Patient is needing: [facility] WOULD LIKE THIS DONE TODAY. PLEASE CALL ONCE COMPLETED.

## 2025-01-17 NOTE — TELEPHONE ENCOUNTER
Caller: Hillary Overton    Relationship to patient: Self    Best call back number:     [phone]       Patient is needing:  PATIENT NEEDS HER TWO NEW PRESCRIPTIONS OF     methylPREDNISolone (MEDROL) 4 MG dose pack   AND     terbinafine (lamiSIL) 250 MG tablet     TO BE SENT TO [facility] IN [place], *NOT* [facility]

## 2025-01-18 LAB
ALBUMIN SERPL-MCNC: 4.4 G/DL (ref 3.5–5.2)
ALBUMIN/GLOB SERPL: 1.5 G/DL
ALP SERPL-CCNC: 125 U/L (ref 39–117)
ALT SERPL-CCNC: 20 U/L (ref 1–33)
AST SERPL-CCNC: 37 U/L (ref 1–32)
BASOPHILS # BLD AUTO: 0.12 10*3/MM3 (ref 0–0.2)
BASOPHILS NFR BLD AUTO: 1.6 % (ref 0–1.5)
BILIRUB SERPL-MCNC: 0.6 MG/DL (ref 0–1.2)
BUN SERPL-MCNC: 13 MG/DL (ref 8–23)
BUN/CREAT SERPL: 20.3 (ref 7–25)
CALCIUM SERPL-MCNC: 9.9 MG/DL (ref 8.6–10.5)
CHLORIDE SERPL-SCNC: 99 MMOL/L (ref 98–107)
CO2 SERPL-SCNC: 27.2 MMOL/L (ref 22–29)
CREAT SERPL-MCNC: 0.64 MG/DL (ref 0.57–1)
EGFRCR SERPLBLD CKD-EPI 2021: 85.7 ML/MIN/1.73
EOSINOPHIL # BLD AUTO: 0.68 10*3/MM3 (ref 0–0.4)
EOSINOPHIL NFR BLD AUTO: 8.8 % (ref 0.3–6.2)
ERYTHROCYTE [DISTWIDTH] IN BLOOD BY AUTOMATED COUNT: 12.6 % (ref 12.3–15.4)
GLOBULIN SER CALC-MCNC: 3 GM/DL
GLUCOSE SERPL-MCNC: 100 MG/DL (ref 65–99)
HCT VFR BLD AUTO: 46.1 % (ref 34–46.6)
HGB BLD-MCNC: 15.7 G/DL (ref 12–15.9)
IMM GRANULOCYTES # BLD AUTO: 0.02 10*3/MM3 (ref 0–0.05)
IMM GRANULOCYTES NFR BLD AUTO: 0.3 % (ref 0–0.5)
LYMPHOCYTES # BLD AUTO: 2.08 10*3/MM3 (ref 0.7–3.1)
LYMPHOCYTES NFR BLD AUTO: 26.9 % (ref 19.6–45.3)
MCH RBC QN AUTO: 31.5 PG (ref 26.6–33)
MCHC RBC AUTO-ENTMCNC: 34.1 G/DL (ref 31.5–35.7)
MCV RBC AUTO: 92.6 FL (ref 79–97)
MONOCYTES # BLD AUTO: 0.65 10*3/MM3 (ref 0.1–0.9)
MONOCYTES NFR BLD AUTO: 8.4 % (ref 5–12)
NEUTROPHILS # BLD AUTO: 4.19 10*3/MM3 (ref 1.7–7)
NEUTROPHILS NFR BLD AUTO: 54 % (ref 42.7–76)
NRBC BLD AUTO-RTO: 0 /100 WBC (ref 0–0.2)
PLATELET # BLD AUTO: 338 10*3/MM3 (ref 140–450)
POTASSIUM SERPL-SCNC: 4.4 MMOL/L (ref 3.5–5.2)
PROT SERPL-MCNC: 7.4 G/DL (ref 6–8.5)
RBC # BLD AUTO: 4.98 10*6/MM3 (ref 3.77–5.28)
SODIUM SERPL-SCNC: 136 MMOL/L (ref 136–145)
WBC # BLD AUTO: 7.74 10*3/MM3 (ref 3.4–10.8)

## 2025-03-14 ENCOUNTER — TELEPHONE (OUTPATIENT)
Dept: FAMILY MEDICINE CLINIC | Facility: CLINIC | Age: 89
End: 2025-03-14

## 2025-03-14 ENCOUNTER — OFFICE VISIT (OUTPATIENT)
Dept: FAMILY MEDICINE CLINIC | Facility: CLINIC | Age: 89
End: 2025-03-14
Payer: MEDICARE

## 2025-03-14 VITALS
DIASTOLIC BLOOD PRESSURE: 62 MMHG | HEART RATE: 82 BPM | SYSTOLIC BLOOD PRESSURE: 126 MMHG | HEIGHT: 62 IN | WEIGHT: 133 LBS | BODY MASS INDEX: 24.48 KG/M2 | OXYGEN SATURATION: 92 %

## 2025-03-14 DIAGNOSIS — L30.8 OTHER ECZEMA: ICD-10-CM

## 2025-03-14 DIAGNOSIS — I10 PRIMARY HYPERTENSION: Chronic | ICD-10-CM

## 2025-03-14 DIAGNOSIS — M81.0 AGE RELATED OSTEOPOROSIS, UNSPECIFIED PATHOLOGICAL FRACTURE PRESENCE: ICD-10-CM

## 2025-03-14 DIAGNOSIS — B35.1 NAIL FUNGUS: Primary | ICD-10-CM

## 2025-03-14 RX ORDER — MAGNESIUM CARB/ALUMINUM HYDROX 105-160MG
TABLET,CHEWABLE ORAL ONCE
COMMUNITY

## 2025-03-14 NOTE — PROGRESS NOTES
Subjective   The ABCs of the Annual Wellness Visit  Medicare Wellness Visit      Hillary Overton is a 89 y.o. patient who presents for a Medicare Wellness Visit.    The following portions of the patient's history were reviewed and   updated as appropriate: allergies, current medications, past family history, past medical history, past social history, past surgical history, and problem list.    Compared to one year ago, the patient's physical   health is the same.  Compared to one year ago, the patient's mental   health is the same.    Recent Hospitalizations:  She was not admitted to the hospital during the last year.     Current Medical Providers:  Patient Care Team:  Myles Christianson MD as PCP - General (Family Medicine)    Outpatient Medications Prior to Visit   Medication Sig Dispense Refill    aspirin 81 MG EC tablet Take 1 tablet by mouth Daily.      B Complex Vitamins (B COMPLEX PO) Take 1 tablet by mouth Daily.      betamethasone dipropionate (DIPROSONE) 0.05 % cream APPLY TO AFFECTED AREA TWICE A DAY FOR 2 WEEKS THEN SWITCH TO TACROLIMUS THEREAFTER      BIOTIN 5000 PO Take 1 capsule by mouth Daily.      Calcium Carb-Cholecalciferol (CALCIUM-VITAMIN D) 600-400 MG-UNIT tablet Take 1 tablet by mouth Daily.      ciclopirox (PENLAC) 8 % solution APPLY TO AFFECTED FINGERNAILS EVERY NIGHT      denosumab (Prolia) 60 MG/ML solution prefilled syringe syringe Inject 1 mL under the skin into the appropriate area as directed Every 6 (Six) Months. 1 mL 1    Diclofenac Sodium (Voltaren) 1 % gel gel Apply 4 g topically to the appropriate area as directed 4 (Four) Times a Day As Needed (hand/hand joint pain).      estradiol (ESTRACE) 0.1 MG/GM vaginal cream APPLY LIGHTLY TO AREA WHERE URINE COMES OUT AS DIRECTED. 42.5 g 0    hydrocortisone 1 % ointment Apply 1 Application topically to the appropriate area as directed 2 (Two) Times a Day. 56 g 1    lisinopril (PRINIVIL,ZESTRIL) 10 MG tablet TAKE 1 TABLET BY MOUTH DAILY 90 tablet  1    loperamide (Imodium A-D) 2 MG tablet Take 1 tablet by mouth 4 (Four) Times a Day As Needed for Diarrhea.      magnesium citrate 1.745 GM/30ML solution solution Take  by mouth 1 (One) Time. OTC      Multiple Vitamins-Minerals (CENTRUM SILVER ULTRA WOMENS) tablet Take 1 tablet by mouth Daily.      Omega-3 Fatty Acids (FISH OIL) 1000 MG capsule capsule Take 1 capsule by mouth Daily With Breakfast.      tacrolimus (PROTOPIC) 0.1 % ointment       triamcinolone (KENALOG) 0.1 % ointment Apply  topically to the appropriate area as directed 2 (Two) Times a Day As Needed for Irritation. 15 g 1    vitamin B-12 (CYANOCOBALAMIN) 100 MCG tablet Take 0.5 tablets by mouth.      methylPREDNISolone (MEDROL) 4 MG dose pack Take as directed on package instructions. 1 each 0    methylPREDNISolone (MEDROL) 4 MG dose pack Take as directed on package instructions. 1 each 0     No facility-administered medications prior to visit.     No opioid medication identified on active medication list. I have reviewed chart for other potential  high risk medication/s and harmful drug interactions in the elderly.      Aspirin is on active medication list. Aspirin use is not indicated based on review of current medical condition/s. Risk of harm outweighs potential benefits. Patient instructed to discontinue this medication.  .      Patient Active Problem List   Diagnosis    Concussion    Congestive heart failure-diastolic    Hypertension    Fibrocystic breast disease    Surgical menopause-see osteoporosis    Osteoporosis 2024-prolia/2y    *Hx anemia    Nodule of neck-R submandibular    Headache    Anticoagulated prevention/ASA 81    Seborrhea    Skin lesion    Degenerative joint disease    Multiple chronic diseases    Hearing loss-aide     Advance Care Planning Advance Directive is not on file.  ACP discussion was held with the patient during this visit. Patient does not have an advance directive, information provided.            Objective  "  Vitals:    25 1315   BP: 126/62   Pulse: 82   SpO2: 92%   Weight: 60.3 kg (133 lb)   Height: 157.5 cm (62\")   PainSc: 0-No pain       Estimated body mass index is 24.33 kg/m² as calculated from the following:    Height as of this encounter: 157.5 cm (62\").    Weight as of this encounter: 60.3 kg (133 lb).    BMI is within normal parameters. No other follow-up for BMI required.       Gait and Balance Evaluation:  Normal      Does the patient have evidence of cognitive impairment? No                                                                                                Health  Risk Assessment    Smoking Status:  Social History     Tobacco Use   Smoking Status Former    Current packs/day: 0.00    Average packs/day: 0.5 packs/day for 34.0 years (17.0 ttl pk-yrs)    Types: Cigarettes    Start date: 1955    Quit date: 1989    Years since quittin.3   Smokeless Tobacco Never     Alcohol Consumption:  Social History     Substance and Sexual Activity   Alcohol Use No       Fall Risk Screen  STEADI Fall Risk Assessment was completed, and patient is at LOW risk for falls.Assessment completed on:3/14/2025    Depression Screening   Little interest or pleasure in doing things? Not at all   Feeling down, depressed, or hopeless? Not at all   PHQ-2 Total Score 0      Health Habits and Functional and Cognitive Screening:      3/14/2025     1:14 PM   Functional & Cognitive Status   Do you have difficulty preparing food and eating? No   Do you have difficulty bathing yourself, getting dressed or grooming yourself? No   Do you have difficulty using the toilet? No   Do you have difficulty moving around from place to place? No   Do you have trouble with steps or getting out of a bed or a chair? No   Current Diet Well Balanced Diet   Dental Exam Up to date   Eye Exam Up to date   Exercise (times per week) 4 times per week   Current Exercises Include Walking;House Cleaning   Do you need help using the phone?  " No   Are you deaf or do you have serious difficulty hearing?  Yes   Do you need help to go to places out of walking distance? No   Do you need help shopping? No   Do you need help preparing meals?  No   Do you need help with housework?  No   Do you need help with laundry? No   Do you need help taking your medications? No   Do you need help managing money? No   Do you ever drive or ride in a car without wearing a seat belt? No   Have you felt unusual stress, anger or loneliness in the last month? No   Who do you live with? Alone   If you need help, do you have trouble finding someone available to you? No   Have you been bothered in the last four weeks by sexual problems? No   Do you have difficulty concentrating, remembering or making decisions? No           Age-appropriate Screening Schedule:  Refer to the list below for future screening recommendations based on patient's age, sex and/or medical conditions. Orders for these recommended tests are listed in the plan section. The patient has been provided with a written plan.    Health Maintenance List  Health Maintenance   Topic Date Due    ZOSTER VACCINE (1 of 2) Never done    INFLUENZA VACCINE  03/31/2025 (Originally 7/1/2024)    ANNUAL WELLNESS VISIT  03/14/2026    DXA SCAN  08/13/2026    TDAP/TD VACCINES (2 - Td or Tdap) 06/23/2031    RSV Vaccine - Adults  Completed    Pneumococcal Vaccine 50+  Completed    COVID-19 Vaccine  Discontinued                                                                                                                                                CMS Preventative Services Quick Reference  Risk Factors Identified During Encounter  Dental Screening Recommended  Vision Screening Recommended    The above risks/problems have been discussed with the patient.  Pertinent information has been shared with the patient in the After Visit Summary.  An After Visit Summary and PPPS were made available to the patient.    Follow Up:   Next Medicare  "Wellness visit to be scheduled in 1 year.         Additional E&M Note during same encounter follows:  Patient has additional, significant, and separately identifiable condition(s)/problem(s) that require work above and beyond the Medicare Wellness Visit     Chief Complaint  Chief Complaint   Patient presents with    Medicare Wellness-subsequent    Eczema        Subjective      History of Present Illness  The patient is an 89-year-old female who presents today for a follow-up visit.    She reports a significant improvement in her pruritus compared to the previous year. She has been using a topical antifungal medication, which has resulted in noticeable improvement in her nail condition. The discoloration of her nails has resolved. She was on terbinafine for approximately 42 days and completed the course about 1.5 weeks ago. She also completed a course of steroids. She has been prescribed betamethasone cream by her dermatologist. She expresses concern about the potential hepatotoxicity of the antifungal medication, as advised by her dermatologist, despite having normal liver function. She has not required any hospitalizations in the past year, except for a cholecystectomy. She has been on a regimen of baby aspirin for several years for cardiac health. She reports no history of myocardial infarction or thrombotic events. She does not consume alcohol but enjoys coffee. She reports no recent falls. She uses hearing aids and reports no other health issues.    SOCIAL HISTORY  She does not drink alcohol.    MEDICATIONS  Current: Betamethasone cream, aspirin.  Discontinued: Terbinafine.          Objective   Vital Signs:  /62   Pulse 82   Ht 157.5 cm (62\")   Wt 60.3 kg (133 lb)   SpO2 92%   BMI 24.33 kg/m²     The following labs/imaging/notes were reviewed and discussed with the patient by Myles Christianson MD on 03/14/2025:            Physical Exam    Physical Exam          Assessment      Diagnoses and all orders for " this visit:    1. Nail fungus (Primary)  -     Comprehensive Metabolic Panel  -     CBC & Differential; Future  -     Comprehensive Metabolic Panel; Future  -     Lipid Panel; Future  -     Vitamin B12 & Folate; Future  -     Vitamin D,25-Hydroxy; Future    2. Other eczema  -     Comprehensive Metabolic Panel  -     CBC & Differential; Future  -     Comprehensive Metabolic Panel; Future  -     Lipid Panel; Future  -     Vitamin B12 & Folate; Future  -     Vitamin D,25-Hydroxy; Future    3. Primary hypertension  -     Comprehensive Metabolic Panel  -     CBC & Differential; Future  -     Comprehensive Metabolic Panel; Future  -     Lipid Panel; Future  -     Vitamin B12 & Folate; Future  -     Vitamin D,25-Hydroxy; Future    4. Age related osteoporosis, unspecified pathological fracture presence  -     Comprehensive Metabolic Panel  -     CBC & Differential; Future  -     Comprehensive Metabolic Panel; Future  -     Lipid Panel; Future  -     Vitamin B12 & Folate; Future  -     Vitamin D,25-Hydroxy; Future             Assessment & Plan  1. Onychomycosis.  Her condition has shown significant improvement with the use of terbinafine, as evidenced by the resolution of her nail fungus. A comprehensive metabolic panel (CMP) will be conducted today to assess any potential impact of terbinafine on her renal and hepatic functions.    2. Eczema.  She is advised to continue the application of betamethasone cream for her eczema. If her condition worsens, she should notify the office immediately.    3. Medication management.  She is instructed to discontinue the use of baby aspirin as it is no longer recommended for primary prevention in her age group due to the risk of side effects outweighing the benefits.    Follow-up  The patient will follow up in 6 months.    PROCEDURE  The patient underwent a cholecystectomy within the past year.    Orders Placed This Encounter   Procedures    Comprehensive Metabolic Panel     Release to  patient:   Routine Release [[id number]]    Comprehensive Metabolic Panel     Standing Status:   Future     Expected Date:   9/14/2025     Expiration Date:   3/14/2026     Release to patient:   Routine Release [[id number]]    Lipid Panel     Standing Status:   Future     Expected Date:   9/14/2025     Expiration Date:   3/14/2026     Release to patient:   Routine Release [[id number]]    Vitamin B12 & Folate     Standing Status:   Future     Expected Date:   9/14/2025     Expiration Date:   3/14/2026     Release to patient:   Routine Release [[id number]]    Vitamin D,25-Hydroxy     Standing Status:   Future     Expected Date:   9/14/2025     Expiration Date:   3/14/2026     Release to patient:   Routine Release [[id number]]    CBC & Differential     Standing Status:   Future     Expected Date:   9/14/2025     Expiration Date:   3/14/2026     Manual Differential:   No     Release to patient:   Routine Release [[id number]]                 Follow Up   Return in about 6 months (around 9/14/2025) for HTN, CHF, lab review. .  Patient was given instructions and counseling regarding her condition or for health maintenance advice. Please see specific information pulled into the AVS if appropriate.    Patient or patient representative verbalized consent for the use of Ambient Listening during the visit with  Myles Christianson MD for chart documentation. 3/14/2025  13:54 CDT

## 2025-03-14 NOTE — TELEPHONE ENCOUNTER
Pt wants to know if there is a shampoo that you can prescribe for her eczema. Says it itches really bad.

## 2025-03-15 LAB
ALBUMIN SERPL-MCNC: 4.2 G/DL (ref 3.5–5.2)
ALBUMIN/GLOB SERPL: 1.8 G/DL
ALP SERPL-CCNC: 92 U/L (ref 39–117)
ALT SERPL-CCNC: 16 U/L (ref 1–33)
AST SERPL-CCNC: 29 U/L (ref 1–32)
BILIRUB SERPL-MCNC: 0.2 MG/DL (ref 0–1.2)
BUN SERPL-MCNC: 15 MG/DL (ref 8–23)
BUN/CREAT SERPL: 20.3 (ref 7–25)
CALCIUM SERPL-MCNC: 9.7 MG/DL (ref 8.6–10.5)
CHLORIDE SERPL-SCNC: 101 MMOL/L (ref 98–107)
CO2 SERPL-SCNC: 24.8 MMOL/L (ref 22–29)
CREAT SERPL-MCNC: 0.74 MG/DL (ref 0.57–1)
EGFRCR SERPLBLD CKD-EPI 2021: 78.4 ML/MIN/1.73
GLOBULIN SER CALC-MCNC: 2.4 GM/DL
GLUCOSE SERPL-MCNC: 116 MG/DL (ref 65–99)
POTASSIUM SERPL-SCNC: 4.3 MMOL/L (ref 3.5–5.2)
PROT SERPL-MCNC: 6.6 G/DL (ref 6–8.5)
SODIUM SERPL-SCNC: 139 MMOL/L (ref 136–145)

## 2025-04-09 DIAGNOSIS — I10 PRIMARY HYPERTENSION: ICD-10-CM

## 2025-04-09 RX ORDER — LISINOPRIL 10 MG/1
10 TABLET ORAL DAILY
Qty: 90 TABLET | Refills: 1 | Status: SHIPPED | OUTPATIENT
Start: 2025-04-09

## 2025-07-14 ENCOUNTER — TELEPHONE (OUTPATIENT)
Dept: FAMILY MEDICINE CLINIC | Facility: CLINIC | Age: 89
End: 2025-07-14
Payer: MEDICARE

## 2025-07-14 DIAGNOSIS — M81.0 OSTEOPOROSIS, UNSPECIFIED OSTEOPOROSIS TYPE, UNSPECIFIED PATHOLOGICAL FRACTURE PRESENCE: ICD-10-CM

## 2025-07-14 RX ORDER — DENOSUMAB 60 MG/ML
60 INJECTION SUBCUTANEOUS
Qty: 1 ML | Refills: 1 | Status: SHIPPED | OUTPATIENT
Start: 2025-07-14

## 2025-07-14 NOTE — TELEPHONE ENCOUNTER
Caller: Hillary Overton    Relationship: Self    Best call back number: [phone]     What is the best time to reach you: ANY    Who are you requesting to speak with (clinical staff, provider,  specific staff member): CLINICAL     Do you know the name of the person who called: SELF    What was the call regarding: PROLIA SHOT    PLEASE CALL PATIENT BACK WITH ADVISEMENT

## 2025-07-14 NOTE — TELEPHONE ENCOUNTER
Called pt back she states she still has not received her prolia shot from [facility], I did let her know we would send the scrip to them and we will call her as soon as it arrives.  Pt did voice understanding.

## 2025-07-30 ENCOUNTER — TELEPHONE (OUTPATIENT)
Dept: FAMILY MEDICINE CLINIC | Facility: CLINIC | Age: 89
End: 2025-07-30
Payer: MEDICARE

## 2025-07-30 NOTE — TELEPHONE ENCOUNTER
I have called the patients pharmacy and they are sending this out today and we should get it tomorrow then we can call the pt to set up an nurse visit and the patient is all aware of this

## 2025-07-30 NOTE — TELEPHONE ENCOUNTER
Pt called upset. Says this is the 4th time she has called in regards to her prolia shot. Says she is past due. She said we were suppose to call her to schedule. She said the pharmacy told her we were to call her.

## 2025-08-06 ENCOUNTER — TELEPHONE (OUTPATIENT)
Dept: FAMILY MEDICINE CLINIC | Facility: CLINIC | Age: 89
End: 2025-08-06
Payer: MEDICARE

## (undated) DEVICE — SEAL

## (undated) DEVICE — LIQUIBAND RAPID ADHESIVE 36/CS 0.8ML: Brand: MEDLINE

## (undated) DEVICE — AIRSEAL 8 MM CANNULA CAP AND OBTURATOR WITH BLADELESS OPTICAL TIP COMPATIBLE WITH INTUITIVE DA VINCI XI AND DA VINCI X 8 MM INSTRUMENT CANNULA, STANDARD LENGTH: Brand: AIRSEAL

## (undated) DEVICE — SURGICEL ENDOSCP APPL

## (undated) DEVICE — AIRSEAL BIFURCATED FILTERED TUBESET WITH ACTIVATED CHARCOAL FILTER: Brand: AIRSEAL

## (undated) DEVICE — COLUMN DRAPE

## (undated) DEVICE — TISSUE RETRIEVAL SYSTEM: Brand: INZII RETRIEVAL SYSTEM

## (undated) DEVICE — GENERAL LAP CDS

## (undated) DEVICE — BLADELESS OBTURATOR: Brand: WECK VISTA

## (undated) DEVICE — AGENT HEMSTAT 3GM OXIDIZED REGENERATED CELOS ABSRB FOR CONT (ORDER MULTIPLES OF 5EA)

## (undated) DEVICE — GOWN, ORBIS, XLONG/XLARGE, STERILE: Brand: MEDLINE

## (undated) DEVICE — SUTURE MONOCRYL SZ 4-0 L18IN ABSRB UD L19MM PS-2 3/8 CIR PRIM Y496G

## (undated) DEVICE — GLOVE SURG SZ 7 CRM LTX FREE POLYISOPRENE POLYMER BEAD ANTI

## (undated) DEVICE — ARM DRAPE

## (undated) DEVICE — COVER LT HNDL BLU PLAS

## (undated) DEVICE — SUTURE VICRYL CTD ANTBCTRL 54 IN TI PLU VLT

## (undated) DEVICE — SOLUTION ANTIFOG VIS SYS CLEARIFY LAPSCP

## (undated) DEVICE — COVER,MAYO STAND,STERILE: Brand: MEDLINE